# Patient Record
Sex: FEMALE | Race: WHITE | NOT HISPANIC OR LATINO | Employment: OTHER | URBAN - METROPOLITAN AREA
[De-identification: names, ages, dates, MRNs, and addresses within clinical notes are randomized per-mention and may not be internally consistent; named-entity substitution may affect disease eponyms.]

---

## 2017-01-13 ENCOUNTER — HOSPITAL ENCOUNTER (OUTPATIENT)
Dept: LAB | Facility: MEDICAL CENTER | Age: 67
End: 2017-01-13
Attending: INTERNAL MEDICINE
Payer: COMMERCIAL

## 2017-01-13 LAB
BUN SERPL-MCNC: 11 MG/DL (ref 8–22)
CREAT SERPL-MCNC: 0.67 MG/DL (ref 0.5–1.4)

## 2017-01-13 PROCEDURE — 82565 ASSAY OF CREATININE: CPT

## 2017-01-13 PROCEDURE — 84520 ASSAY OF UREA NITROGEN: CPT

## 2017-01-13 PROCEDURE — 36415 COLL VENOUS BLD VENIPUNCTURE: CPT

## 2017-01-16 ENCOUNTER — HOSPITAL ENCOUNTER (OUTPATIENT)
Dept: RADIOLOGY | Facility: MEDICAL CENTER | Age: 67
End: 2017-01-16
Attending: INTERNAL MEDICINE
Payer: COMMERCIAL

## 2017-01-16 DIAGNOSIS — R10.9 ABDOMINAL PAIN, UNSPECIFIED SITE: ICD-10-CM

## 2017-01-16 PROCEDURE — 74177 CT ABD & PELVIS W/CONTRAST: CPT

## 2017-01-16 PROCEDURE — 700117 HCHG RX CONTRAST REV CODE 255: Performed by: INTERNAL MEDICINE

## 2017-01-16 RX ADMIN — IOHEXOL 100 ML: 350 INJECTION, SOLUTION INTRAVENOUS at 08:44

## 2018-01-01 ENCOUNTER — HOSPITAL ENCOUNTER (EMERGENCY)
Facility: MEDICAL CENTER | Age: 68
End: 2018-04-10
Attending: EMERGENCY MEDICINE
Payer: MEDICARE

## 2018-01-01 ENCOUNTER — HOSPITAL ENCOUNTER (OUTPATIENT)
Dept: RADIATION ONCOLOGY | Facility: MEDICAL CENTER | Age: 68
End: 2018-09-27

## 2018-01-01 ENCOUNTER — HOME CARE VISIT (OUTPATIENT)
Dept: HOSPICE | Facility: HOSPICE | Age: 68
End: 2018-01-01
Payer: MEDICARE

## 2018-01-01 ENCOUNTER — HOSPITAL ENCOUNTER (OUTPATIENT)
Dept: LAB | Facility: MEDICAL CENTER | Age: 68
End: 2018-07-26
Attending: NURSE PRACTITIONER
Payer: MEDICARE

## 2018-01-01 ENCOUNTER — TELEPHONE (OUTPATIENT)
Dept: RADIATION ONCOLOGY | Facility: MEDICAL CENTER | Age: 68
End: 2018-01-01

## 2018-01-01 ENCOUNTER — RESOLUTE PROFESSIONAL BILLING HOSPITAL PROF FEE (OUTPATIENT)
Dept: HOSPITALIST | Facility: MEDICAL CENTER | Age: 68
End: 2018-01-01
Payer: MEDICARE

## 2018-01-01 ENCOUNTER — HOSPITAL ENCOUNTER (OUTPATIENT)
Dept: RADIOLOGY | Facility: MEDICAL CENTER | Age: 68
End: 2018-06-28
Attending: INTERNAL MEDICINE
Payer: MEDICARE

## 2018-01-01 ENCOUNTER — HOSPITAL ENCOUNTER (OUTPATIENT)
Dept: RADIATION ONCOLOGY | Facility: MEDICAL CENTER | Age: 68
End: 2018-07-02

## 2018-01-01 ENCOUNTER — HOSPITAL ENCOUNTER (OUTPATIENT)
Dept: RADIATION ONCOLOGY | Facility: MEDICAL CENTER | Age: 68
End: 2018-04-30

## 2018-01-01 ENCOUNTER — HOSPICE ADMISSION (OUTPATIENT)
Dept: HOSPICE | Facility: HOSPICE | Age: 68
End: 2018-01-01
Payer: MEDICARE

## 2018-01-01 ENCOUNTER — HOSPITAL ENCOUNTER (OUTPATIENT)
Dept: CARDIOLOGY | Facility: MEDICAL CENTER | Age: 68
End: 2018-01-01
Attending: INTERNAL MEDICINE
Payer: MEDICARE

## 2018-01-01 ENCOUNTER — HOSPITAL ENCOUNTER (OUTPATIENT)
Dept: RADIOLOGY | Facility: MEDICAL CENTER | Age: 68
End: 2018-04-17
Attending: PHYSICIAN ASSISTANT

## 2018-01-01 ENCOUNTER — PATIENT OUTREACH (OUTPATIENT)
Dept: HEALTH INFORMATION MANAGEMENT | Facility: OTHER | Age: 68
End: 2018-01-01

## 2018-01-01 ENCOUNTER — HOSPITAL ENCOUNTER (OUTPATIENT)
Dept: LAB | Facility: MEDICAL CENTER | Age: 68
End: 2018-09-08
Attending: RADIOLOGY
Payer: MEDICARE

## 2018-01-01 ENCOUNTER — APPOINTMENT (OUTPATIENT)
Dept: RADIOLOGY | Facility: MEDICAL CENTER | Age: 68
End: 2018-01-01
Attending: EMERGENCY MEDICINE
Payer: MEDICARE

## 2018-01-01 ENCOUNTER — APPOINTMENT (OUTPATIENT)
Dept: RADIOLOGY | Facility: MEDICAL CENTER | Age: 68
DRG: 025 | End: 2018-01-01
Attending: RADIOLOGY
Payer: MEDICARE

## 2018-01-01 ENCOUNTER — HOSPITAL ENCOUNTER (OUTPATIENT)
Dept: LAB | Facility: MEDICAL CENTER | Age: 68
End: 2018-04-27
Attending: FAMILY MEDICINE
Payer: MEDICARE

## 2018-01-01 ENCOUNTER — HOSPITAL ENCOUNTER (OUTPATIENT)
Facility: MEDICAL CENTER | Age: 68
End: 2018-06-28
Attending: RADIOLOGY | Admitting: RADIOLOGY
Payer: MEDICARE

## 2018-01-01 ENCOUNTER — HOSPITAL ENCOUNTER (OUTPATIENT)
Dept: RADIOLOGY | Facility: MEDICAL CENTER | Age: 68
End: 2018-06-14
Attending: INTERNAL MEDICINE
Payer: MEDICARE

## 2018-01-01 ENCOUNTER — APPOINTMENT (OUTPATIENT)
Dept: RADIOLOGY | Facility: MEDICAL CENTER | Age: 68
DRG: 025 | End: 2018-01-01
Attending: EMERGENCY MEDICINE
Payer: MEDICARE

## 2018-01-01 ENCOUNTER — TELEPHONE (OUTPATIENT)
Dept: MEDICAL GROUP | Facility: MEDICAL CENTER | Age: 68
End: 2018-01-01

## 2018-01-01 ENCOUNTER — APPOINTMENT (OUTPATIENT)
Dept: HOSPICE | Facility: HOSPICE | Age: 68
End: 2018-01-01
Payer: MEDICARE

## 2018-01-01 ENCOUNTER — HOSPITAL ENCOUNTER (INPATIENT)
Facility: MEDICAL CENTER | Age: 68
LOS: 10 days | DRG: 025 | End: 2018-04-20
Attending: EMERGENCY MEDICINE | Admitting: INTERNAL MEDICINE
Payer: MEDICARE

## 2018-01-01 ENCOUNTER — HOSPITAL ENCOUNTER (OUTPATIENT)
Dept: LAB | Facility: MEDICAL CENTER | Age: 68
End: 2018-10-29
Attending: INTERNAL MEDICINE
Payer: MEDICARE

## 2018-01-01 ENCOUNTER — HOSPITAL ENCOUNTER (OUTPATIENT)
Dept: RADIATION ONCOLOGY | Facility: MEDICAL CENTER | Age: 68
End: 2018-05-10

## 2018-01-01 ENCOUNTER — HOSPITAL ENCOUNTER (OUTPATIENT)
Dept: RADIATION ONCOLOGY | Facility: MEDICAL CENTER | Age: 68
End: 2018-06-25
Attending: RADIOLOGY
Payer: MEDICARE

## 2018-01-01 ENCOUNTER — HOSPITAL ENCOUNTER (OUTPATIENT)
Dept: RADIATION ONCOLOGY | Facility: MEDICAL CENTER | Age: 68
End: 2018-09-30
Attending: RADIOLOGY
Payer: MEDICARE

## 2018-01-01 ENCOUNTER — HOSPITAL ENCOUNTER (OUTPATIENT)
Dept: RADIOLOGY | Facility: MEDICAL CENTER | Age: 68
End: 2018-08-17
Attending: INTERNAL MEDICINE
Payer: MEDICARE

## 2018-01-01 ENCOUNTER — HOSPITAL ENCOUNTER (OUTPATIENT)
Dept: CARDIOLOGY | Facility: MEDICAL CENTER | Age: 68
End: 2018-06-25
Attending: INTERNAL MEDICINE
Payer: MEDICARE

## 2018-01-01 ENCOUNTER — HOSPITAL ENCOUNTER (OUTPATIENT)
Dept: LAB | Facility: MEDICAL CENTER | Age: 68
End: 2018-07-12
Attending: NURSE PRACTITIONER
Payer: MEDICARE

## 2018-01-01 ENCOUNTER — HOSPITAL ENCOUNTER (OUTPATIENT)
Dept: RADIATION ONCOLOGY | Facility: MEDICAL CENTER | Age: 68
End: 2018-07-31
Attending: RADIOLOGY
Payer: MEDICARE

## 2018-01-01 ENCOUNTER — HOSPITAL ENCOUNTER (EMERGENCY)
Facility: MEDICAL CENTER | Age: 68
End: 2018-09-28
Attending: EMERGENCY MEDICINE
Payer: MEDICARE

## 2018-01-01 ENCOUNTER — APPOINTMENT (OUTPATIENT)
Dept: RADIOLOGY | Facility: MEDICAL CENTER | Age: 68
DRG: 025 | End: 2018-01-01
Attending: PHYSICIAN ASSISTANT
Payer: MEDICARE

## 2018-01-01 ENCOUNTER — PATIENT OUTREACH (OUTPATIENT)
Dept: OTHER | Facility: MEDICAL CENTER | Age: 68
End: 2018-01-01

## 2018-01-01 ENCOUNTER — HOSPITAL ENCOUNTER (OUTPATIENT)
Dept: LAB | Facility: MEDICAL CENTER | Age: 68
End: 2018-07-06
Attending: NURSE PRACTITIONER
Payer: MEDICARE

## 2018-01-01 ENCOUNTER — HOSPITAL ENCOUNTER (OUTPATIENT)
Dept: RADIATION ONCOLOGY | Facility: MEDICAL CENTER | Age: 68
End: 2018-09-24

## 2018-01-01 ENCOUNTER — HOSPITAL ENCOUNTER (OUTPATIENT)
Dept: RADIATION ONCOLOGY | Facility: MEDICAL CENTER | Age: 68
End: 2018-10-31
Attending: RADIOLOGY
Payer: MEDICARE

## 2018-01-01 ENCOUNTER — HOSPITAL ENCOUNTER (OUTPATIENT)
Dept: RADIATION ONCOLOGY | Facility: MEDICAL CENTER | Age: 68
End: 2018-06-26
Payer: MEDICARE

## 2018-01-01 ENCOUNTER — HOSPITAL ENCOUNTER (OUTPATIENT)
Dept: RADIATION ONCOLOGY | Facility: MEDICAL CENTER | Age: 68
End: 2018-05-31
Attending: RADIOLOGY
Payer: MEDICARE

## 2018-01-01 ENCOUNTER — HOSPITAL ENCOUNTER (OUTPATIENT)
Dept: RADIATION ONCOLOGY | Facility: MEDICAL CENTER | Age: 68
End: 2018-04-30
Attending: RADIOLOGY
Payer: MEDICARE

## 2018-01-01 ENCOUNTER — HOSPITAL ENCOUNTER (OUTPATIENT)
Dept: RADIATION ONCOLOGY | Facility: MEDICAL CENTER | Age: 68
End: 2018-08-31
Attending: RADIOLOGY
Payer: MEDICARE

## 2018-01-01 ENCOUNTER — HOSPITAL ENCOUNTER (OUTPATIENT)
Dept: CARDIOLOGY | Facility: MEDICAL CENTER | Age: 68
End: 2018-09-03
Attending: INTERNAL MEDICINE
Payer: MEDICARE

## 2018-01-01 ENCOUNTER — HOSPITAL ENCOUNTER (OUTPATIENT)
Dept: RADIOLOGY | Facility: MEDICAL CENTER | Age: 68
End: 2018-05-25
Attending: INTERNAL MEDICINE
Payer: MEDICARE

## 2018-01-01 ENCOUNTER — HOSPITAL ENCOUNTER (OUTPATIENT)
Dept: LAB | Facility: MEDICAL CENTER | Age: 68
End: 2018-08-09
Attending: INTERNAL MEDICINE
Payer: MEDICARE

## 2018-01-01 ENCOUNTER — HOSPITAL ENCOUNTER (OUTPATIENT)
Dept: CARDIOLOGY | Facility: MEDICAL CENTER | Age: 68
End: 2018-06-27
Attending: INTERNAL MEDICINE
Payer: MEDICARE

## 2018-01-01 ENCOUNTER — PATIENT MESSAGE (OUTPATIENT)
Dept: HEALTH INFORMATION MANAGEMENT | Facility: OTHER | Age: 68
End: 2018-01-01

## 2018-01-01 ENCOUNTER — HOSPITAL ENCOUNTER (EMERGENCY)
Facility: MEDICAL CENTER | Age: 68
End: 2018-09-10
Attending: EMERGENCY MEDICINE
Payer: MEDICARE

## 2018-01-01 ENCOUNTER — HOSPITAL ENCOUNTER (OUTPATIENT)
Dept: RADIOLOGY | Facility: MEDICAL CENTER | Age: 68
End: 2018-09-18
Attending: RADIOLOGY
Payer: MEDICARE

## 2018-01-01 VITALS
HEART RATE: 94 BPM | TEMPERATURE: 97.9 F | RESPIRATION RATE: 20 BRPM | SYSTOLIC BLOOD PRESSURE: 120 MMHG | DIASTOLIC BLOOD PRESSURE: 60 MMHG | OXYGEN SATURATION: 93 %

## 2018-01-01 VITALS
HEART RATE: 84 BPM | OXYGEN SATURATION: 98 % | DIASTOLIC BLOOD PRESSURE: 76 MMHG | SYSTOLIC BLOOD PRESSURE: 128 MMHG | RESPIRATION RATE: 20 BRPM | TEMPERATURE: 97.7 F

## 2018-01-01 VITALS
DIASTOLIC BLOOD PRESSURE: 59 MMHG | SYSTOLIC BLOOD PRESSURE: 100 MMHG | RESPIRATION RATE: 16 BRPM | HEART RATE: 78 BPM | OXYGEN SATURATION: 98 %

## 2018-01-01 VITALS — SYSTOLIC BLOOD PRESSURE: 113 MMHG | HEART RATE: 91 BPM | DIASTOLIC BLOOD PRESSURE: 74 MMHG | RESPIRATION RATE: 18 BRPM

## 2018-01-01 VITALS — DIASTOLIC BLOOD PRESSURE: 79 MMHG | RESPIRATION RATE: 18 BRPM | SYSTOLIC BLOOD PRESSURE: 135 MMHG | HEART RATE: 103 BPM

## 2018-01-01 VITALS
RESPIRATION RATE: 18 BRPM | HEART RATE: 90 BPM | DIASTOLIC BLOOD PRESSURE: 81 MMHG | SYSTOLIC BLOOD PRESSURE: 137 MMHG | OXYGEN SATURATION: 98 %

## 2018-01-01 VITALS
SYSTOLIC BLOOD PRESSURE: 121 MMHG | DIASTOLIC BLOOD PRESSURE: 73 MMHG | RESPIRATION RATE: 18 BRPM | HEART RATE: 89 BPM | OXYGEN SATURATION: 98 %

## 2018-01-01 VITALS
DIASTOLIC BLOOD PRESSURE: 57 MMHG | WEIGHT: 154 LBS | SYSTOLIC BLOOD PRESSURE: 106 MMHG | HEIGHT: 68 IN | OXYGEN SATURATION: 98 % | HEART RATE: 90 BPM | BODY MASS INDEX: 23.34 KG/M2 | RESPIRATION RATE: 16 BRPM

## 2018-01-01 VITALS
OXYGEN SATURATION: 96 % | WEIGHT: 151.24 LBS | DIASTOLIC BLOOD PRESSURE: 95 MMHG | HEIGHT: 68 IN | SYSTOLIC BLOOD PRESSURE: 188 MMHG | HEART RATE: 106 BPM | RESPIRATION RATE: 16 BRPM | BODY MASS INDEX: 22.92 KG/M2 | TEMPERATURE: 98.9 F

## 2018-01-01 VITALS
RESPIRATION RATE: 16 BRPM | DIASTOLIC BLOOD PRESSURE: 60 MMHG | WEIGHT: 143.74 LBS | TEMPERATURE: 98.8 F | SYSTOLIC BLOOD PRESSURE: 138 MMHG | BODY MASS INDEX: 21.78 KG/M2 | OXYGEN SATURATION: 92 % | HEIGHT: 68 IN | HEART RATE: 68 BPM

## 2018-01-01 VITALS
DIASTOLIC BLOOD PRESSURE: 75 MMHG | WEIGHT: 154 LBS | HEART RATE: 102 BPM | TEMPERATURE: 97.5 F | BODY MASS INDEX: 23.42 KG/M2 | OXYGEN SATURATION: 95 % | SYSTOLIC BLOOD PRESSURE: 148 MMHG

## 2018-01-01 VITALS
DIASTOLIC BLOOD PRESSURE: 67 MMHG | HEART RATE: 82 BPM | RESPIRATION RATE: 16 BRPM | SYSTOLIC BLOOD PRESSURE: 121 MMHG | OXYGEN SATURATION: 97 %

## 2018-01-01 VITALS
RESPIRATION RATE: 18 BRPM | OXYGEN SATURATION: 99 % | DIASTOLIC BLOOD PRESSURE: 73 MMHG | SYSTOLIC BLOOD PRESSURE: 101 MMHG | HEART RATE: 93 BPM

## 2018-01-01 VITALS
HEART RATE: 90 BPM | SYSTOLIC BLOOD PRESSURE: 145 MMHG | BODY MASS INDEX: 25.73 KG/M2 | DIASTOLIC BLOOD PRESSURE: 72 MMHG | OXYGEN SATURATION: 96 % | HEIGHT: 68 IN | WEIGHT: 169.75 LBS | RESPIRATION RATE: 19 BRPM | TEMPERATURE: 98.4 F

## 2018-01-01 VITALS
OXYGEN SATURATION: 94 % | HEART RATE: 91 BPM | WEIGHT: 156.53 LBS | TEMPERATURE: 98.2 F | RESPIRATION RATE: 18 BRPM | HEIGHT: 68 IN | SYSTOLIC BLOOD PRESSURE: 132 MMHG | BODY MASS INDEX: 23.72 KG/M2 | DIASTOLIC BLOOD PRESSURE: 66 MMHG

## 2018-01-01 VITALS
OXYGEN SATURATION: 94 % | BODY MASS INDEX: 23.95 KG/M2 | TEMPERATURE: 98.5 F | DIASTOLIC BLOOD PRESSURE: 67 MMHG | HEART RATE: 101 BPM | WEIGHT: 157.5 LBS | SYSTOLIC BLOOD PRESSURE: 144 MMHG

## 2018-01-01 VITALS
DIASTOLIC BLOOD PRESSURE: 66 MMHG | RESPIRATION RATE: 18 BRPM | OXYGEN SATURATION: 89 % | SYSTOLIC BLOOD PRESSURE: 117 MMHG | HEART RATE: 105 BPM

## 2018-01-01 VITALS
OXYGEN SATURATION: 98 % | SYSTOLIC BLOOD PRESSURE: 127 MMHG | HEART RATE: 86 BPM | DIASTOLIC BLOOD PRESSURE: 68 MMHG | RESPIRATION RATE: 18 BRPM

## 2018-01-01 VITALS
OXYGEN SATURATION: 95 % | RESPIRATION RATE: 16 BRPM | SYSTOLIC BLOOD PRESSURE: 129 MMHG | DIASTOLIC BLOOD PRESSURE: 65 MMHG | HEART RATE: 64 BPM

## 2018-01-01 VITALS
RESPIRATION RATE: 18 BRPM | OXYGEN SATURATION: 99 % | SYSTOLIC BLOOD PRESSURE: 113 MMHG | HEART RATE: 44 BPM | DIASTOLIC BLOOD PRESSURE: 55 MMHG

## 2018-01-01 VITALS
HEART RATE: 88 BPM | SYSTOLIC BLOOD PRESSURE: 121 MMHG | DIASTOLIC BLOOD PRESSURE: 73 MMHG | RESPIRATION RATE: 18 BRPM | OXYGEN SATURATION: 98 %

## 2018-01-01 VITALS
RESPIRATION RATE: 16 BRPM | HEART RATE: 99 BPM | SYSTOLIC BLOOD PRESSURE: 136 MMHG | DIASTOLIC BLOOD PRESSURE: 85 MMHG | OXYGEN SATURATION: 93 %

## 2018-01-01 VITALS — HEART RATE: 90 BPM | DIASTOLIC BLOOD PRESSURE: 87 MMHG | SYSTOLIC BLOOD PRESSURE: 150 MMHG | RESPIRATION RATE: 16 BRPM

## 2018-01-01 VITALS
RESPIRATION RATE: 18 BRPM | DIASTOLIC BLOOD PRESSURE: 70 MMHG | OXYGEN SATURATION: 99 % | HEART RATE: 94 BPM | SYSTOLIC BLOOD PRESSURE: 131 MMHG

## 2018-01-01 VITALS
DIASTOLIC BLOOD PRESSURE: 69 MMHG | OXYGEN SATURATION: 98 % | RESPIRATION RATE: 16 BRPM | HEART RATE: 78 BPM | SYSTOLIC BLOOD PRESSURE: 129 MMHG

## 2018-01-01 VITALS — HEART RATE: 92 BPM | RESPIRATION RATE: 18 BRPM

## 2018-01-01 VITALS
SYSTOLIC BLOOD PRESSURE: 119 MMHG | RESPIRATION RATE: 18 BRPM | DIASTOLIC BLOOD PRESSURE: 70 MMHG | HEART RATE: 97 BPM | OXYGEN SATURATION: 95 %

## 2018-01-01 VITALS
HEART RATE: 87 BPM | OXYGEN SATURATION: 98 % | DIASTOLIC BLOOD PRESSURE: 60 MMHG | SYSTOLIC BLOOD PRESSURE: 108 MMHG | RESPIRATION RATE: 16 BRPM

## 2018-01-01 VITALS
DIASTOLIC BLOOD PRESSURE: 65 MMHG | WEIGHT: 156 LBS | HEART RATE: 92 BPM | SYSTOLIC BLOOD PRESSURE: 144 MMHG | OXYGEN SATURATION: 98 % | TEMPERATURE: 98.5 F | BODY MASS INDEX: 23.72 KG/M2

## 2018-01-01 VITALS
SYSTOLIC BLOOD PRESSURE: 106 MMHG | RESPIRATION RATE: 18 BRPM | OXYGEN SATURATION: 89 % | HEART RATE: 93 BPM | DIASTOLIC BLOOD PRESSURE: 58 MMHG

## 2018-01-01 DIAGNOSIS — C50.912 MALIGNANT NEOPLASM OF LEFT FEMALE BREAST, UNSPECIFIED ESTROGEN RECEPTOR STATUS, UNSPECIFIED SITE OF BREAST (HCC): ICD-10-CM

## 2018-01-01 DIAGNOSIS — C79.31 MALIGNANT NEOPLASM METASTATIC TO BRAIN (HCC): ICD-10-CM

## 2018-01-01 DIAGNOSIS — C79.31 SECONDARY CANCER OF BRAIN (HCC): ICD-10-CM

## 2018-01-01 DIAGNOSIS — R56.9 SEIZURE (HCC): ICD-10-CM

## 2018-01-01 DIAGNOSIS — C50.919 MALIGNANT NEOPLASM OF FEMALE BREAST, UNSPECIFIED ESTROGEN RECEPTOR STATUS, UNSPECIFIED LATERALITY, UNSPECIFIED SITE OF BREAST (HCC): ICD-10-CM

## 2018-01-01 DIAGNOSIS — C79.31 BRAIN METASTASIS: ICD-10-CM

## 2018-01-01 DIAGNOSIS — C79.31 BRAIN METASTASES: ICD-10-CM

## 2018-01-01 DIAGNOSIS — R51.9 INTRACTABLE HEADACHE, UNSPECIFIED CHRONICITY PATTERN, UNSPECIFIED HEADACHE TYPE: ICD-10-CM

## 2018-01-01 DIAGNOSIS — R93.89 INFILTRATE NOTED ON IMAGING STUDY: ICD-10-CM

## 2018-01-01 DIAGNOSIS — C79.31 SECONDARY MALIGNANT NEOPLASM OF BRAIN AND SPINAL CORD (HCC): ICD-10-CM

## 2018-01-01 DIAGNOSIS — G93.6 INTRACRANIAL EDEMA (HCC): ICD-10-CM

## 2018-01-01 DIAGNOSIS — G93.89 BRAIN MASS: ICD-10-CM

## 2018-01-01 DIAGNOSIS — C50.911 MALIGNANT NEOPLASM OF RIGHT FEMALE BREAST, UNSPECIFIED ESTROGEN RECEPTOR STATUS, UNSPECIFIED SITE OF BREAST (HCC): ICD-10-CM

## 2018-01-01 DIAGNOSIS — R90.0 INTRACRANIAL MASS: ICD-10-CM

## 2018-01-01 DIAGNOSIS — R51.9 CHRONIC NONINTRACTABLE HEADACHE, UNSPECIFIED HEADACHE TYPE: ICD-10-CM

## 2018-01-01 DIAGNOSIS — C79.31 MALIGNANT NEOPLASM OF BREAST METASTATIC TO BRAIN, UNSPECIFIED LATERALITY (HCC): ICD-10-CM

## 2018-01-01 DIAGNOSIS — C50.919 MALIGNANT NEOPLASM OF BREAST METASTATIC TO BRAIN, UNSPECIFIED LATERALITY (HCC): ICD-10-CM

## 2018-01-01 DIAGNOSIS — C79.49 SECONDARY MALIGNANT NEOPLASM OF BRAIN AND SPINAL CORD (HCC): ICD-10-CM

## 2018-01-01 DIAGNOSIS — G89.29 CHRONIC NONINTRACTABLE HEADACHE, UNSPECIFIED HEADACHE TYPE: ICD-10-CM

## 2018-01-01 DIAGNOSIS — C79.9 METASTATIC DISEASE (HCC): ICD-10-CM

## 2018-01-01 DIAGNOSIS — R91.8 HILAR MASS: ICD-10-CM

## 2018-01-01 LAB
ABO GROUP BLD: NORMAL
ABO GROUP BLD: NORMAL
ALBUMIN SERPL BCP-MCNC: 3.8 G/DL (ref 3.2–4.9)
ALBUMIN SERPL BCP-MCNC: 4 G/DL (ref 3.2–4.9)
ALBUMIN SERPL BCP-MCNC: 4.1 G/DL (ref 3.2–4.9)
ALBUMIN SERPL BCP-MCNC: 4.2 G/DL (ref 3.2–4.9)
ALBUMIN SERPL BCP-MCNC: 4.2 G/DL (ref 3.2–4.9)
ALBUMIN SERPL BCP-MCNC: 4.3 G/DL (ref 3.2–4.9)
ALBUMIN SERPL BCP-MCNC: 4.4 G/DL (ref 3.2–4.9)
ALBUMIN SERPL BCP-MCNC: 4.6 G/DL (ref 3.2–4.9)
ALBUMIN/GLOB SERPL: 1.1 G/DL
ALBUMIN/GLOB SERPL: 1.3 G/DL
ALBUMIN/GLOB SERPL: 1.4 G/DL
ALBUMIN/GLOB SERPL: 1.4 G/DL
ALBUMIN/GLOB SERPL: 1.6 G/DL
ALBUMIN/GLOB SERPL: 1.8 G/DL
ALBUMIN/GLOB SERPL: 1.8 G/DL
ALBUMIN/GLOB SERPL: 1.9 G/DL
ALBUMIN/GLOB SERPL: 1.9 G/DL
ALBUMIN/GLOB SERPL: 2 G/DL
ALP SERPL-CCNC: 103 U/L (ref 30–99)
ALP SERPL-CCNC: 106 U/L (ref 30–99)
ALP SERPL-CCNC: 107 U/L (ref 30–99)
ALP SERPL-CCNC: 117 U/L (ref 30–99)
ALP SERPL-CCNC: 125 U/L (ref 30–99)
ALP SERPL-CCNC: 61 U/L (ref 30–99)
ALP SERPL-CCNC: 72 U/L (ref 30–99)
ALP SERPL-CCNC: 73 U/L (ref 30–99)
ALP SERPL-CCNC: 87 U/L (ref 30–99)
ALP SERPL-CCNC: 89 U/L (ref 30–99)
ALT SERPL-CCNC: 10 U/L (ref 2–50)
ALT SERPL-CCNC: 11 U/L (ref 2–50)
ALT SERPL-CCNC: 12 U/L (ref 2–50)
ALT SERPL-CCNC: 13 U/L (ref 2–50)
ALT SERPL-CCNC: 14 U/L (ref 2–50)
ALT SERPL-CCNC: 15 U/L (ref 2–50)
ALT SERPL-CCNC: 18 U/L (ref 2–50)
ALT SERPL-CCNC: 29 U/L (ref 2–50)
ALT SERPL-CCNC: 30 U/L (ref 2–50)
ALT SERPL-CCNC: 9 U/L (ref 2–50)
AMBIGUOUS DTTM AMBI4: NORMAL
AMBIGUOUS DTTM AMBI4: NORMAL
ANION GAP SERPL CALC-SCNC: 10 MMOL/L (ref 0–11.9)
ANION GAP SERPL CALC-SCNC: 11 MMOL/L (ref 0–11.9)
ANION GAP SERPL CALC-SCNC: 11 MMOL/L (ref 0–11.9)
ANION GAP SERPL CALC-SCNC: 12 MMOL/L (ref 0–11.9)
ANION GAP SERPL CALC-SCNC: 6 MMOL/L (ref 0–11.9)
ANION GAP SERPL CALC-SCNC: 6 MMOL/L (ref 0–11.9)
ANION GAP SERPL CALC-SCNC: 7 MMOL/L (ref 0–11.9)
ANION GAP SERPL CALC-SCNC: 7 MMOL/L (ref 0–11.9)
ANION GAP SERPL CALC-SCNC: 8 MMOL/L (ref 0–11.9)
ANION GAP SERPL CALC-SCNC: 9 MMOL/L (ref 0–11.9)
APPEARANCE UR: CLEAR
APTT PPP: 34.5 SEC (ref 24.7–36)
APTT PPP: 34.5 SEC (ref 24.7–36)
AST SERPL-CCNC: 12 U/L (ref 12–45)
AST SERPL-CCNC: 12 U/L (ref 12–45)
AST SERPL-CCNC: 13 U/L (ref 12–45)
AST SERPL-CCNC: 14 U/L (ref 12–45)
AST SERPL-CCNC: 15 U/L (ref 12–45)
AST SERPL-CCNC: 22 U/L (ref 12–45)
AST SERPL-CCNC: 26 U/L (ref 12–45)
AST SERPL-CCNC: 30 U/L (ref 12–45)
BACTERIA SPEC RESP CULT: ABNORMAL
BACTERIA SPEC RESP CULT: ABNORMAL
BACTERIA STL CULT: NORMAL
BASOPHILS # BLD AUTO: 0.1 % (ref 0–1.8)
BASOPHILS # BLD AUTO: 0.2 % (ref 0–1.8)
BASOPHILS # BLD AUTO: 0.3 % (ref 0–1.8)
BASOPHILS # BLD AUTO: 0.4 % (ref 0–1.8)
BASOPHILS # BLD AUTO: 0.6 % (ref 0–1.8)
BASOPHILS # BLD AUTO: 0.6 % (ref 0–1.8)
BASOPHILS # BLD: 0.01 K/UL (ref 0–0.12)
BASOPHILS # BLD: 0.01 K/UL (ref 0–0.12)
BASOPHILS # BLD: 0.02 K/UL (ref 0–0.12)
BASOPHILS # BLD: 0.03 K/UL (ref 0–0.12)
BASOPHILS # BLD: 0.04 K/UL (ref 0–0.12)
BASOPHILS # BLD: 0.05 K/UL (ref 0–0.12)
BASOPHILS # BLD: 0.05 K/UL (ref 0–0.12)
BASOPHILS # BLD: 0.06 K/UL (ref 0–0.12)
BASOPHILS # BLD: 0.06 K/UL (ref 0–0.12)
BILIRUB SERPL-MCNC: 0.2 MG/DL (ref 0.1–1.5)
BILIRUB SERPL-MCNC: 0.3 MG/DL (ref 0.1–1.5)
BILIRUB SERPL-MCNC: 0.4 MG/DL (ref 0.1–1.5)
BILIRUB SERPL-MCNC: 0.5 MG/DL (ref 0.1–1.5)
BILIRUB UR QL STRIP.AUTO: ABNORMAL
BLD GP AB SCN SERPL QL: NORMAL
BUN SERPL-MCNC: 10 MG/DL (ref 8–22)
BUN SERPL-MCNC: 11 MG/DL (ref 8–22)
BUN SERPL-MCNC: 11 MG/DL (ref 8–22)
BUN SERPL-MCNC: 12 MG/DL (ref 8–22)
BUN SERPL-MCNC: 13 MG/DL (ref 8–22)
BUN SERPL-MCNC: 14 MG/DL (ref 8–22)
BUN SERPL-MCNC: 14 MG/DL (ref 8–22)
BUN SERPL-MCNC: 15 MG/DL (ref 8–22)
BUN SERPL-MCNC: 15 MG/DL (ref 8–22)
BUN SERPL-MCNC: 16 MG/DL (ref 8–22)
BUN SERPL-MCNC: 17 MG/DL (ref 8–22)
BUN SERPL-MCNC: 17 MG/DL (ref 8–22)
BUN SERPL-MCNC: 20 MG/DL (ref 8–22)
BUN SERPL-MCNC: 21 MG/DL (ref 8–22)
BUN SERPL-MCNC: 7 MG/DL (ref 8–22)
BUN SERPL-MCNC: 9 MG/DL (ref 8–22)
BUN SERPL-MCNC: 9 MG/DL (ref 8–22)
CALCIUM SERPL-MCNC: 10 MG/DL (ref 8.5–10.5)
CALCIUM SERPL-MCNC: 7.5 MG/DL (ref 8.5–10.5)
CALCIUM SERPL-MCNC: 8.5 MG/DL (ref 8.5–10.5)
CALCIUM SERPL-MCNC: 8.6 MG/DL (ref 8.5–10.5)
CALCIUM SERPL-MCNC: 8.8 MG/DL (ref 8.5–10.5)
CALCIUM SERPL-MCNC: 8.9 MG/DL (ref 8.5–10.5)
CALCIUM SERPL-MCNC: 9 MG/DL (ref 8.5–10.5)
CALCIUM SERPL-MCNC: 9.1 MG/DL (ref 8.4–10.2)
CALCIUM SERPL-MCNC: 9.3 MG/DL (ref 8.5–10.5)
CALCIUM SERPL-MCNC: 9.4 MG/DL (ref 8.5–10.5)
CALCIUM SERPL-MCNC: 9.5 MG/DL (ref 8.5–10.5)
CALCIUM SERPL-MCNC: 9.6 MG/DL (ref 8.5–10.5)
CALCIUM SERPL-MCNC: 9.7 MG/DL (ref 8.5–10.5)
CALCIUM SERPL-MCNC: 9.8 MG/DL (ref 8.5–10.5)
CALCIUM SERPL-MCNC: 9.9 MG/DL (ref 8.5–10.5)
CHLORIDE SERPL-SCNC: 100 MMOL/L (ref 96–112)
CHLORIDE SERPL-SCNC: 101 MMOL/L (ref 96–112)
CHLORIDE SERPL-SCNC: 103 MMOL/L (ref 96–112)
CHLORIDE SERPL-SCNC: 104 MMOL/L (ref 96–112)
CHLORIDE SERPL-SCNC: 104 MMOL/L (ref 96–112)
CHLORIDE SERPL-SCNC: 105 MMOL/L (ref 96–112)
CHLORIDE SERPL-SCNC: 106 MMOL/L (ref 96–112)
CHLORIDE SERPL-SCNC: 110 MMOL/L (ref 96–112)
CHLORIDE SERPL-SCNC: 98 MMOL/L (ref 96–112)
CHLORIDE SERPL-SCNC: 98 MMOL/L (ref 96–112)
CHLORIDE SERPL-SCNC: 99 MMOL/L (ref 96–112)
CO2 SERPL-SCNC: 22 MMOL/L (ref 20–33)
CO2 SERPL-SCNC: 23 MMOL/L (ref 20–33)
CO2 SERPL-SCNC: 24 MMOL/L (ref 20–33)
CO2 SERPL-SCNC: 25 MMOL/L (ref 20–33)
CO2 SERPL-SCNC: 26 MMOL/L (ref 20–33)
CO2 SERPL-SCNC: 26 MMOL/L (ref 20–33)
CO2 SERPL-SCNC: 27 MMOL/L (ref 20–33)
CO2 SERPL-SCNC: 28 MMOL/L (ref 20–33)
COLOR UR: YELLOW
CREAT SERPL-MCNC: 0.4 MG/DL (ref 0.5–1.4)
CREAT SERPL-MCNC: 0.43 MG/DL (ref 0.5–1.4)
CREAT SERPL-MCNC: 0.52 MG/DL (ref 0.5–1.4)
CREAT SERPL-MCNC: 0.54 MG/DL (ref 0.5–1.4)
CREAT SERPL-MCNC: 0.55 MG/DL (ref 0.5–1.4)
CREAT SERPL-MCNC: 0.57 MG/DL (ref 0.5–1.4)
CREAT SERPL-MCNC: 0.58 MG/DL (ref 0.5–1.4)
CREAT SERPL-MCNC: 0.59 MG/DL (ref 0.5–1.4)
CREAT SERPL-MCNC: 0.6 MG/DL (ref 0.5–1.4)
CREAT SERPL-MCNC: 0.65 MG/DL (ref 0.5–1.4)
CREAT SERPL-MCNC: 0.67 MG/DL (ref 0.5–1.4)
CREAT SERPL-MCNC: 0.69 MG/DL (ref 0.5–1.4)
CREAT SERPL-MCNC: 0.69 MG/DL (ref 0.5–1.4)
CREAT SERPL-MCNC: 0.73 MG/DL (ref 0.5–1.4)
CREAT SERPL-MCNC: 0.8 MG/DL (ref 0.5–1.4)
CREAT SERPL-MCNC: 0.88 MG/DL (ref 0.5–1.4)
CULTURE IF INDICATED INDCX: NO UA CULTURE
CYTOLOGY REG CYTOL: NORMAL
CYTOLOGY REG CYTOL: NORMAL
E COLI SXT1+2 STL IA: NORMAL
EKG IMPRESSION: NORMAL
EKG IMPRESSION: NORMAL
EOSINOPHIL # BLD AUTO: 0 K/UL (ref 0–0.51)
EOSINOPHIL # BLD AUTO: 0.01 K/UL (ref 0–0.51)
EOSINOPHIL # BLD AUTO: 0.15 K/UL (ref 0–0.51)
EOSINOPHIL # BLD AUTO: 0.19 K/UL (ref 0–0.51)
EOSINOPHIL # BLD AUTO: 0.24 K/UL (ref 0–0.51)
EOSINOPHIL NFR BLD: 0 % (ref 0–6.9)
EOSINOPHIL NFR BLD: 0.1 % (ref 0–6.9)
EOSINOPHIL NFR BLD: 0.8 % (ref 0–6.9)
EOSINOPHIL NFR BLD: 2.1 % (ref 0–6.9)
EOSINOPHIL NFR BLD: 2.3 % (ref 0–6.9)
ERYTHROCYTE [DISTWIDTH] IN BLOOD BY AUTOMATED COUNT: 45.1 FL (ref 35.9–50)
ERYTHROCYTE [DISTWIDTH] IN BLOOD BY AUTOMATED COUNT: 45.4 FL (ref 35.9–50)
ERYTHROCYTE [DISTWIDTH] IN BLOOD BY AUTOMATED COUNT: 45.5 FL (ref 35.9–50)
ERYTHROCYTE [DISTWIDTH] IN BLOOD BY AUTOMATED COUNT: 45.5 FL (ref 35.9–50)
ERYTHROCYTE [DISTWIDTH] IN BLOOD BY AUTOMATED COUNT: 45.8 FL (ref 35.9–50)
ERYTHROCYTE [DISTWIDTH] IN BLOOD BY AUTOMATED COUNT: 45.8 FL (ref 35.9–50)
ERYTHROCYTE [DISTWIDTH] IN BLOOD BY AUTOMATED COUNT: 46 FL (ref 35.9–50)
ERYTHROCYTE [DISTWIDTH] IN BLOOD BY AUTOMATED COUNT: 46.4 FL (ref 35.9–50)
ERYTHROCYTE [DISTWIDTH] IN BLOOD BY AUTOMATED COUNT: 46.8 FL (ref 35.9–50)
ERYTHROCYTE [DISTWIDTH] IN BLOOD BY AUTOMATED COUNT: 47.2 FL (ref 35.9–50)
ERYTHROCYTE [DISTWIDTH] IN BLOOD BY AUTOMATED COUNT: 47.3 FL (ref 35.9–50)
ERYTHROCYTE [DISTWIDTH] IN BLOOD BY AUTOMATED COUNT: 47.4 FL (ref 35.9–50)
FUNGUS SPEC CULT: NORMAL
GLOBULIN SER CALC-MCNC: 2.1 G/DL (ref 1.9–3.5)
GLOBULIN SER CALC-MCNC: 2.2 G/DL (ref 1.9–3.5)
GLOBULIN SER CALC-MCNC: 2.4 G/DL (ref 1.9–3.5)
GLOBULIN SER CALC-MCNC: 2.4 G/DL (ref 1.9–3.5)
GLOBULIN SER CALC-MCNC: 2.5 G/DL (ref 1.9–3.5)
GLOBULIN SER CALC-MCNC: 2.6 G/DL (ref 1.9–3.5)
GLOBULIN SER CALC-MCNC: 2.8 G/DL (ref 1.9–3.5)
GLOBULIN SER CALC-MCNC: 3.1 G/DL (ref 1.9–3.5)
GLOBULIN SER CALC-MCNC: 3.2 G/DL (ref 1.9–3.5)
GLOBULIN SER CALC-MCNC: 3.4 G/DL (ref 1.9–3.5)
GLUCOSE SERPL-MCNC: 105 MG/DL (ref 65–99)
GLUCOSE SERPL-MCNC: 106 MG/DL (ref 65–99)
GLUCOSE SERPL-MCNC: 106 MG/DL (ref 65–99)
GLUCOSE SERPL-MCNC: 108 MG/DL (ref 65–99)
GLUCOSE SERPL-MCNC: 114 MG/DL (ref 65–99)
GLUCOSE SERPL-MCNC: 115 MG/DL (ref 65–99)
GLUCOSE SERPL-MCNC: 117 MG/DL (ref 65–99)
GLUCOSE SERPL-MCNC: 119 MG/DL (ref 65–99)
GLUCOSE SERPL-MCNC: 120 MG/DL (ref 65–99)
GLUCOSE SERPL-MCNC: 128 MG/DL (ref 65–99)
GLUCOSE SERPL-MCNC: 133 MG/DL (ref 65–99)
GLUCOSE SERPL-MCNC: 142 MG/DL (ref 65–99)
GLUCOSE SERPL-MCNC: 85 MG/DL (ref 65–99)
GLUCOSE SERPL-MCNC: 85 MG/DL (ref 65–99)
GLUCOSE SERPL-MCNC: 88 MG/DL (ref 65–99)
GLUCOSE SERPL-MCNC: 91 MG/DL (ref 65–99)
GLUCOSE SERPL-MCNC: 95 MG/DL (ref 65–99)
GLUCOSE SERPL-MCNC: 98 MG/DL (ref 65–99)
GLUCOSE SERPL-MCNC: 98 MG/DL (ref 65–99)
GLUCOSE SERPL-MCNC: 99 MG/DL (ref 65–99)
GLUCOSE UR STRIP.AUTO-MCNC: NEGATIVE MG/DL
GRAM STN SPEC: ABNORMAL
GRAM STN SPEC: NORMAL
HCT VFR BLD AUTO: 36.4 % (ref 37–47)
HCT VFR BLD AUTO: 37.1 % (ref 37–47)
HCT VFR BLD AUTO: 38.4 % (ref 37–47)
HCT VFR BLD AUTO: 39 % (ref 37–47)
HCT VFR BLD AUTO: 39 % (ref 37–47)
HCT VFR BLD AUTO: 40.3 % (ref 37–47)
HCT VFR BLD AUTO: 40.4 % (ref 37–47)
HCT VFR BLD AUTO: 41.9 % (ref 37–47)
HCT VFR BLD AUTO: 43.5 % (ref 37–47)
HCT VFR BLD AUTO: 43.9 % (ref 37–47)
HCT VFR BLD AUTO: 44.8 % (ref 37–47)
HCT VFR BLD AUTO: 47.3 % (ref 37–47)
HGB BLD-MCNC: 12.2 G/DL (ref 12–16)
HGB BLD-MCNC: 12.6 G/DL (ref 12–16)
HGB BLD-MCNC: 13.3 G/DL (ref 12–16)
HGB BLD-MCNC: 13.4 G/DL (ref 12–16)
HGB BLD-MCNC: 13.7 G/DL (ref 12–16)
HGB BLD-MCNC: 14 G/DL (ref 12–16)
HGB BLD-MCNC: 14.5 G/DL (ref 12–16)
HGB BLD-MCNC: 14.6 G/DL (ref 12–16)
HGB BLD-MCNC: 14.7 G/DL (ref 12–16)
HGB BLD-MCNC: 15.1 G/DL (ref 12–16)
HGB BLD-MCNC: 15.8 G/DL (ref 12–16)
HGB BLD-MCNC: 16.5 G/DL (ref 12–16)
IMM GRANULOCYTES # BLD AUTO: 0.03 K/UL (ref 0–0.11)
IMM GRANULOCYTES # BLD AUTO: 0.04 K/UL (ref 0–0.11)
IMM GRANULOCYTES # BLD AUTO: 0.08 K/UL (ref 0–0.11)
IMM GRANULOCYTES # BLD AUTO: 0.08 K/UL (ref 0–0.11)
IMM GRANULOCYTES # BLD AUTO: 0.09 K/UL (ref 0–0.11)
IMM GRANULOCYTES # BLD AUTO: 0.13 K/UL (ref 0–0.11)
IMM GRANULOCYTES # BLD AUTO: 0.16 K/UL (ref 0–0.11)
IMM GRANULOCYTES # BLD AUTO: 0.24 K/UL (ref 0–0.11)
IMM GRANULOCYTES # BLD AUTO: 0.31 K/UL (ref 0–0.11)
IMM GRANULOCYTES NFR BLD AUTO: 0.3 % (ref 0–0.9)
IMM GRANULOCYTES NFR BLD AUTO: 0.4 % (ref 0–0.9)
IMM GRANULOCYTES NFR BLD AUTO: 0.4 % (ref 0–0.9)
IMM GRANULOCYTES NFR BLD AUTO: 0.5 % (ref 0–0.9)
IMM GRANULOCYTES NFR BLD AUTO: 0.6 % (ref 0–0.9)
IMM GRANULOCYTES NFR BLD AUTO: 0.7 % (ref 0–0.9)
IMM GRANULOCYTES NFR BLD AUTO: 0.7 % (ref 0–0.9)
IMM GRANULOCYTES NFR BLD AUTO: 0.8 % (ref 0–0.9)
IMM GRANULOCYTES NFR BLD AUTO: 1 % (ref 0–0.9)
IMM GRANULOCYTES NFR BLD AUTO: 1.7 % (ref 0–0.9)
IMM GRANULOCYTES NFR BLD AUTO: 1.7 % (ref 0–0.9)
INR PPP: 0.9 (ref 0.87–1.13)
INR PPP: 0.91 (ref 0.87–1.13)
INR PPP: 0.94 (ref 0.87–1.13)
KETONES UR STRIP.AUTO-MCNC: 15 MG/DL
LEUKOCYTE ESTERASE UR QL STRIP.AUTO: NEGATIVE
LV EJECT FRACT  99904: 65
LV EJECT FRACT  99904: 65
LV EJECT FRACT MOD 2C 99903: 61.12
LV EJECT FRACT MOD 2C 99903: 68.7
LV EJECT FRACT MOD 4C 99902: 64.74
LV EJECT FRACT MOD 4C 99902: 65.66
LV EJECT FRACT MOD BP 99901: 62.22
LV EJECT FRACT MOD BP 99901: 63.75
LYMPHOCYTES # BLD AUTO: 1.01 K/UL (ref 1–4.8)
LYMPHOCYTES # BLD AUTO: 1.05 K/UL (ref 1–4.8)
LYMPHOCYTES # BLD AUTO: 1.11 K/UL (ref 1–4.8)
LYMPHOCYTES # BLD AUTO: 1.23 K/UL (ref 1–4.8)
LYMPHOCYTES # BLD AUTO: 1.31 K/UL (ref 1–4.8)
LYMPHOCYTES # BLD AUTO: 1.43 K/UL (ref 1–4.8)
LYMPHOCYTES # BLD AUTO: 1.91 K/UL (ref 1–4.8)
LYMPHOCYTES # BLD AUTO: 2.3 K/UL (ref 1–4.8)
LYMPHOCYTES # BLD AUTO: 2.65 K/UL (ref 1–4.8)
LYMPHOCYTES NFR BLD: 11.3 % (ref 22–41)
LYMPHOCYTES NFR BLD: 11.8 % (ref 22–41)
LYMPHOCYTES NFR BLD: 17.7 % (ref 22–41)
LYMPHOCYTES NFR BLD: 18.2 % (ref 22–41)
LYMPHOCYTES NFR BLD: 22.1 % (ref 22–41)
LYMPHOCYTES NFR BLD: 28.6 % (ref 22–41)
LYMPHOCYTES NFR BLD: 5.6 % (ref 22–41)
LYMPHOCYTES NFR BLD: 6.1 % (ref 22–41)
LYMPHOCYTES NFR BLD: 6.9 % (ref 22–41)
LYMPHOCYTES NFR BLD: 9.3 % (ref 22–41)
LYMPHOCYTES NFR BLD: 9.4 % (ref 22–41)
MAGNESIUM SERPL-MCNC: 1.7 MG/DL (ref 1.5–2.5)
MAGNESIUM SERPL-MCNC: 1.8 MG/DL (ref 1.5–2.5)
MAGNESIUM SERPL-MCNC: 1.9 MG/DL (ref 1.5–2.5)
MAGNESIUM SERPL-MCNC: 1.9 MG/DL (ref 1.5–2.5)
MAGNESIUM SERPL-MCNC: 2 MG/DL (ref 1.5–2.5)
MAGNESIUM SERPL-MCNC: 2.1 MG/DL (ref 1.5–2.5)
MAGNESIUM SERPL-MCNC: 2.2 MG/DL (ref 1.5–2.5)
MAGNESIUM SERPL-MCNC: 2.4 MG/DL (ref 1.5–2.5)
MCH RBC QN AUTO: 30.7 PG (ref 27–33)
MCH RBC QN AUTO: 33.7 PG (ref 27–33)
MCH RBC QN AUTO: 33.9 PG (ref 27–33)
MCH RBC QN AUTO: 34 PG (ref 27–33)
MCH RBC QN AUTO: 34.1 PG (ref 27–33)
MCH RBC QN AUTO: 34.5 PG (ref 27–33)
MCH RBC QN AUTO: 34.5 PG (ref 27–33)
MCH RBC QN AUTO: 34.6 PG (ref 27–33)
MCH RBC QN AUTO: 34.6 PG (ref 27–33)
MCH RBC QN AUTO: 34.7 PG (ref 27–33)
MCH RBC QN AUTO: 34.8 PG (ref 27–33)
MCH RBC QN AUTO: 34.9 PG (ref 27–33)
MCHC RBC AUTO-ENTMCNC: 32.9 G/DL (ref 33.6–35)
MCHC RBC AUTO-ENTMCNC: 33.8 G/DL (ref 33.6–35)
MCHC RBC AUTO-ENTMCNC: 34.1 G/DL (ref 33.6–35)
MCHC RBC AUTO-ENTMCNC: 34.4 G/DL (ref 33.6–35)
MCHC RBC AUTO-ENTMCNC: 34.6 G/DL (ref 33.6–35)
MCHC RBC AUTO-ENTMCNC: 34.6 G/DL (ref 33.6–35)
MCHC RBC AUTO-ENTMCNC: 34.7 G/DL (ref 33.6–35)
MCHC RBC AUTO-ENTMCNC: 34.9 G/DL (ref 33.6–35)
MCHC RBC AUTO-ENTMCNC: 34.9 G/DL (ref 33.6–35)
MCHC RBC AUTO-ENTMCNC: 35.1 G/DL (ref 33.6–35)
MCHC RBC AUTO-ENTMCNC: 35.3 G/DL (ref 33.6–35)
MCHC RBC AUTO-ENTMCNC: 36.1 G/DL (ref 33.6–35)
MCV RBC AUTO: 100.2 FL (ref 81.4–97.8)
MCV RBC AUTO: 100.7 FL (ref 81.4–97.8)
MCV RBC AUTO: 93.2 FL (ref 81.4–97.8)
MCV RBC AUTO: 96.4 FL (ref 81.4–97.8)
MCV RBC AUTO: 98 FL (ref 81.4–97.8)
MCV RBC AUTO: 98 FL (ref 81.4–97.8)
MCV RBC AUTO: 98.6 FL (ref 81.4–97.8)
MCV RBC AUTO: 99 FL (ref 81.4–97.8)
MCV RBC AUTO: 99 FL (ref 81.4–97.8)
MCV RBC AUTO: 99.2 FL (ref 81.4–97.8)
MCV RBC AUTO: 99.5 FL (ref 81.4–97.8)
MCV RBC AUTO: 99.5 FL (ref 81.4–97.8)
MICRO URNS: ABNORMAL
MONOCYTES # BLD AUTO: 0.14 K/UL (ref 0–0.85)
MONOCYTES # BLD AUTO: 0.2 K/UL (ref 0–0.85)
MONOCYTES # BLD AUTO: 0.38 K/UL (ref 0–0.85)
MONOCYTES # BLD AUTO: 0.38 K/UL (ref 0–0.85)
MONOCYTES # BLD AUTO: 0.51 K/UL (ref 0–0.85)
MONOCYTES # BLD AUTO: 0.86 K/UL (ref 0–0.85)
MONOCYTES # BLD AUTO: 0.9 K/UL (ref 0–0.85)
MONOCYTES # BLD AUTO: 1.05 K/UL (ref 0–0.85)
MONOCYTES # BLD AUTO: 1.07 K/UL (ref 0–0.85)
MONOCYTES # BLD AUTO: 1.29 K/UL (ref 0–0.85)
MONOCYTES # BLD AUTO: 1.46 K/UL (ref 0–0.85)
MONOCYTES NFR BLD AUTO: 1.5 % (ref 0–13.4)
MONOCYTES NFR BLD AUTO: 11.6 % (ref 0–13.4)
MONOCYTES NFR BLD AUTO: 2.1 % (ref 0–13.4)
MONOCYTES NFR BLD AUTO: 2.9 % (ref 0–13.4)
MONOCYTES NFR BLD AUTO: 4.7 % (ref 0–13.4)
MONOCYTES NFR BLD AUTO: 4.8 % (ref 0–13.4)
MONOCYTES NFR BLD AUTO: 4.8 % (ref 0–13.4)
MONOCYTES NFR BLD AUTO: 6.4 % (ref 0–13.4)
MONOCYTES NFR BLD AUTO: 7.2 % (ref 0–13.4)
MONOCYTES NFR BLD AUTO: 8.3 % (ref 0–13.4)
MONOCYTES NFR BLD AUTO: 8.8 % (ref 0–13.4)
MYCOBACTERIUM SPEC CULT: NORMAL
NEUTROPHILS # BLD AUTO: 11.49 K/UL (ref 2–7.15)
NEUTROPHILS # BLD AUTO: 11.54 K/UL (ref 2–7.15)
NEUTROPHILS # BLD AUTO: 14.01 K/UL (ref 2–7.15)
NEUTROPHILS # BLD AUTO: 14.83 K/UL (ref 2–7.15)
NEUTROPHILS # BLD AUTO: 19.6 K/UL (ref 2–7.15)
NEUTROPHILS # BLD AUTO: 5.26 K/UL (ref 2–7.15)
NEUTROPHILS # BLD AUTO: 5.97 K/UL (ref 2–7.15)
NEUTROPHILS # BLD AUTO: 6.86 K/UL (ref 2–7.15)
NEUTROPHILS # BLD AUTO: 7.98 K/UL (ref 2–7.15)
NEUTROPHILS # BLD AUTO: 8.01 K/UL (ref 2–7.15)
NEUTROPHILS # BLD AUTO: 8.3 K/UL (ref 2–7.15)
NEUTROPHILS NFR BLD: 56.8 % (ref 44–72)
NEUTROPHILS NFR BLD: 65.9 % (ref 44–72)
NEUTROPHILS NFR BLD: 76.3 % (ref 44–72)
NEUTROPHILS NFR BLD: 76.7 % (ref 44–72)
NEUTROPHILS NFR BLD: 82.4 % (ref 44–72)
NEUTROPHILS NFR BLD: 83.1 % (ref 44–72)
NEUTROPHILS NFR BLD: 83.8 % (ref 44–72)
NEUTROPHILS NFR BLD: 85.5 % (ref 44–72)
NEUTROPHILS NFR BLD: 86.3 % (ref 44–72)
NEUTROPHILS NFR BLD: 87 % (ref 44–72)
NEUTROPHILS NFR BLD: 88.8 % (ref 44–72)
NITRITE UR QL STRIP.AUTO: NEGATIVE
NRBC # BLD AUTO: 0 K/UL
NRBC BLD-RTO: 0 /100 WBC
PH UR STRIP.AUTO: 6 [PH]
PLATELET # BLD AUTO: 280 K/UL (ref 164–446)
PLATELET # BLD AUTO: 302 K/UL (ref 164–446)
PLATELET # BLD AUTO: 307 K/UL (ref 164–446)
PLATELET # BLD AUTO: 326 K/UL (ref 164–446)
PLATELET # BLD AUTO: 340 K/UL (ref 164–446)
PLATELET # BLD AUTO: 358 K/UL (ref 164–446)
PLATELET # BLD AUTO: 363 K/UL (ref 164–446)
PLATELET # BLD AUTO: 372 K/UL (ref 164–446)
PLATELET # BLD AUTO: 377 K/UL (ref 164–446)
PLATELET # BLD AUTO: 387 K/UL (ref 164–446)
PLATELET # BLD AUTO: 395 K/UL (ref 164–446)
PLATELET # BLD AUTO: 400 K/UL (ref 164–446)
PLATELET # BLD AUTO: 498 K/UL (ref 164–446)
PMV BLD AUTO: 10.3 FL (ref 9–12.9)
PMV BLD AUTO: 10.6 FL (ref 9–12.9)
PMV BLD AUTO: 8.3 FL (ref 9–12.9)
PMV BLD AUTO: 8.9 FL (ref 9–12.9)
PMV BLD AUTO: 9 FL (ref 9–12.9)
PMV BLD AUTO: 9.2 FL (ref 9–12.9)
PMV BLD AUTO: 9.2 FL (ref 9–12.9)
PMV BLD AUTO: 9.6 FL (ref 9–12.9)
PMV BLD AUTO: 9.6 FL (ref 9–12.9)
PMV BLD AUTO: 9.7 FL (ref 9–12.9)
PMV BLD AUTO: 9.7 FL (ref 9–12.9)
PMV BLD AUTO: 9.8 FL (ref 9–12.9)
POTASSIUM SERPL-SCNC: 3.6 MMOL/L (ref 3.6–5.5)
POTASSIUM SERPL-SCNC: 3.6 MMOL/L (ref 3.6–5.5)
POTASSIUM SERPL-SCNC: 3.7 MMOL/L (ref 3.6–5.5)
POTASSIUM SERPL-SCNC: 3.7 MMOL/L (ref 3.6–5.5)
POTASSIUM SERPL-SCNC: 3.9 MMOL/L (ref 3.6–5.5)
POTASSIUM SERPL-SCNC: 4 MMOL/L (ref 3.6–5.5)
POTASSIUM SERPL-SCNC: 4.1 MMOL/L (ref 3.6–5.5)
POTASSIUM SERPL-SCNC: 4.3 MMOL/L (ref 3.6–5.5)
POTASSIUM SERPL-SCNC: 4.4 MMOL/L (ref 3.6–5.5)
POTASSIUM SERPL-SCNC: 4.4 MMOL/L (ref 3.6–5.5)
POTASSIUM SERPL-SCNC: 4.5 MMOL/L (ref 3.6–5.5)
POTASSIUM SERPL-SCNC: 4.6 MMOL/L (ref 3.6–5.5)
POTASSIUM SERPL-SCNC: 4.7 MMOL/L (ref 3.6–5.5)
POTASSIUM SERPL-SCNC: 5.2 MMOL/L (ref 3.6–5.5)
PROT SERPL-MCNC: 6.3 G/DL (ref 6–8.2)
PROT SERPL-MCNC: 6.3 G/DL (ref 6–8.2)
PROT SERPL-MCNC: 6.7 G/DL (ref 6–8.2)
PROT SERPL-MCNC: 6.8 G/DL (ref 6–8.2)
PROT SERPL-MCNC: 6.8 G/DL (ref 6–8.2)
PROT SERPL-MCNC: 6.9 G/DL (ref 6–8.2)
PROT SERPL-MCNC: 7 G/DL (ref 6–8.2)
PROT SERPL-MCNC: 7.2 G/DL (ref 6–8.2)
PROT SERPL-MCNC: 7.4 G/DL (ref 6–8.2)
PROT SERPL-MCNC: 7.5 G/DL (ref 6–8.2)
PROT UR QL STRIP: NEGATIVE MG/DL
PROTHROMBIN TIME: 12 SEC (ref 12–14.6)
PROTHROMBIN TIME: 12.1 SEC (ref 12–14.6)
PROTHROMBIN TIME: 12.3 SEC (ref 12–14.6)
RBC # BLD AUTO: 3.69 M/UL (ref 4.2–5.4)
RBC # BLD AUTO: 3.88 M/UL (ref 4.2–5.4)
RBC # BLD AUTO: 3.92 M/UL (ref 4.2–5.4)
RBC # BLD AUTO: 3.93 M/UL (ref 4.2–5.4)
RBC # BLD AUTO: 3.98 M/UL (ref 4.2–5.4)
RBC # BLD AUTO: 4.05 M/UL (ref 4.2–5.4)
RBC # BLD AUTO: 4.18 M/UL (ref 4.2–5.4)
RBC # BLD AUTO: 4.19 M/UL (ref 4.2–5.4)
RBC # BLD AUTO: 4.32 M/UL (ref 4.2–5.4)
RBC # BLD AUTO: 4.48 M/UL (ref 4.2–5.4)
RBC # BLD AUTO: 4.57 M/UL (ref 4.2–5.4)
RBC # BLD AUTO: 4.78 M/UL (ref 4.2–5.4)
RBC UR QL AUTO: NEGATIVE
RH BLD: NORMAL
RH BLD: NORMAL
RHODAMINE-AURAMINE STN SPEC: NORMAL
RHODAMINE-AURAMINE STN SPEC: NORMAL
SIGNIFICANT IND 70042: ABNORMAL
SIGNIFICANT IND 70042: NORMAL
SITE SITE: ABNORMAL
SITE SITE: NORMAL
SODIUM SERPL-SCNC: 131 MMOL/L (ref 135–145)
SODIUM SERPL-SCNC: 132 MMOL/L (ref 135–145)
SODIUM SERPL-SCNC: 133 MMOL/L (ref 135–145)
SODIUM SERPL-SCNC: 133 MMOL/L (ref 135–145)
SODIUM SERPL-SCNC: 134 MMOL/L (ref 135–145)
SODIUM SERPL-SCNC: 134 MMOL/L (ref 135–145)
SODIUM SERPL-SCNC: 135 MMOL/L (ref 135–145)
SODIUM SERPL-SCNC: 135 MMOL/L (ref 135–145)
SODIUM SERPL-SCNC: 136 MMOL/L (ref 135–145)
SODIUM SERPL-SCNC: 137 MMOL/L (ref 135–145)
SODIUM SERPL-SCNC: 137 MMOL/L (ref 135–145)
SODIUM SERPL-SCNC: 138 MMOL/L (ref 135–145)
SODIUM SERPL-SCNC: 138 MMOL/L (ref 135–145)
SODIUM SERPL-SCNC: 141 MMOL/L (ref 135–145)
SOURCE SOURCE: ABNORMAL
SOURCE SOURCE: NORMAL
SP GR UR STRIP.AUTO: 1.02
TEST NAME 95000: NORMAL
TROPONIN I SERPL-MCNC: 0.04 NG/ML (ref 0–0.04)
TROPONIN I SERPL-MCNC: <0.01 NG/ML (ref 0–0.04)
WBC # BLD AUTO: 10.4 K/UL (ref 4.8–10.8)
WBC # BLD AUTO: 10.8 K/UL (ref 4.8–10.8)
WBC # BLD AUTO: 11.4 K/UL (ref 4.8–10.8)
WBC # BLD AUTO: 13.2 K/UL (ref 4.8–10.8)
WBC # BLD AUTO: 14 K/UL (ref 4.8–10.8)
WBC # BLD AUTO: 16.7 K/UL (ref 4.8–10.8)
WBC # BLD AUTO: 17.9 K/UL (ref 4.8–10.8)
WBC # BLD AUTO: 22.1 K/UL (ref 4.8–10.8)
WBC # BLD AUTO: 7.8 K/UL (ref 4.8–10.8)
WBC # BLD AUTO: 9.3 K/UL (ref 4.8–10.8)
WBC # BLD AUTO: 9.3 K/UL (ref 4.8–10.8)
WBC # BLD AUTO: 9.4 K/UL (ref 4.8–10.8)

## 2018-01-01 PROCEDURE — 83735 ASSAY OF MAGNESIUM: CPT

## 2018-01-01 PROCEDURE — 77280 THER RAD SIMULAJ FIELD SMPL: CPT | Mod: 26 | Performed by: RADIOLOGY

## 2018-01-01 PROCEDURE — 71045 X-RAY EXAM CHEST 1 VIEW: CPT

## 2018-01-01 PROCEDURE — A6222 GAUZE <=16 IN NO W/SAL W/O B: HCPCS | Performed by: NEUROLOGICAL SURGERY

## 2018-01-01 PROCEDURE — S9126 HOSPICE CARE, IN THE HOME, P: HCPCS

## 2018-01-01 PROCEDURE — 6650990 HC HOSPICE AND HOME CARE RX REV CODE 0250

## 2018-01-01 PROCEDURE — 77470 SPECIAL RADIATION TREATMENT: CPT | Performed by: RADIOLOGY

## 2018-01-01 PROCEDURE — 77295 3-D RADIOTHERAPY PLAN: CPT | Performed by: RADIOLOGY

## 2018-01-01 PROCEDURE — 700117 HCHG RX CONTRAST REV CODE 255: Performed by: RADIOLOGY

## 2018-01-01 PROCEDURE — 80053 COMPREHEN METABOLIC PANEL: CPT

## 2018-01-01 PROCEDURE — 770001 HCHG ROOM/CARE - MED/SURG/GYN PRIV*

## 2018-01-01 PROCEDURE — 770022 HCHG ROOM/CARE - ICU (200)

## 2018-01-01 PROCEDURE — 700105 HCHG RX REV CODE 258: Performed by: STUDENT IN AN ORGANIZED HEALTH CARE EDUCATION/TRAINING PROGRAM

## 2018-01-01 PROCEDURE — 77334 RADIATION TREATMENT AID(S): CPT | Performed by: RADIOLOGY

## 2018-01-01 PROCEDURE — 99232 SBSQ HOSP IP/OBS MODERATE 35: CPT | Mod: GC | Performed by: INTERNAL MEDICINE

## 2018-01-01 PROCEDURE — A9270 NON-COVERED ITEM OR SERVICE: HCPCS | Performed by: PHYSICIAN ASSISTANT

## 2018-01-01 PROCEDURE — G0299 HHS/HOSPICE OF RN EA 15 MIN: HCPCS

## 2018-01-01 PROCEDURE — 84484 ASSAY OF TROPONIN QUANT: CPT

## 2018-01-01 PROCEDURE — 80048 BASIC METABOLIC PNL TOTAL CA: CPT

## 2018-01-01 PROCEDURE — G0155 HHCP-SVS OF CSW,EA 15 MIN: HCPCS

## 2018-01-01 PROCEDURE — 700102 HCHG RX REV CODE 250 W/ 637 OVERRIDE(OP): Performed by: STUDENT IN AN ORGANIZED HEALTH CARE EDUCATION/TRAINING PROGRAM

## 2018-01-01 PROCEDURE — 160042 HCHG SURGERY MINUTES - EA ADDL 1 MIN LEVEL 5: Performed by: NEUROLOGICAL SURGERY

## 2018-01-01 PROCEDURE — 77290 THER RAD SIMULAJ FIELD CPLX: CPT | Mod: 26 | Performed by: RADIOLOGY

## 2018-01-01 PROCEDURE — 85025 COMPLETE CBC W/AUTO DIFF WBC: CPT

## 2018-01-01 PROCEDURE — A9270 NON-COVERED ITEM OR SERVICE: HCPCS | Performed by: STUDENT IN AN ORGANIZED HEALTH CARE EDUCATION/TRAINING PROGRAM

## 2018-01-01 PROCEDURE — 77336 RADIATION PHYSICS CONSULT: CPT | Mod: XU | Performed by: RADIOLOGY

## 2018-01-01 PROCEDURE — 302978 HCHG BRONCHOSCOPY-DIAGNOSTIC

## 2018-01-01 PROCEDURE — 700102 HCHG RX REV CODE 250 W/ 637 OVERRIDE(OP): Performed by: INTERNAL MEDICINE

## 2018-01-01 PROCEDURE — A9270 NON-COVERED ITEM OR SERVICE: HCPCS | Performed by: INTERNAL MEDICINE

## 2018-01-01 PROCEDURE — 77412 RADIATION TX DELIVERY LVL 3: CPT | Performed by: RADIOLOGY

## 2018-01-01 PROCEDURE — 700111 HCHG RX REV CODE 636 W/ 250 OVERRIDE (IP)

## 2018-01-01 PROCEDURE — 700111 HCHG RX REV CODE 636 W/ 250 OVERRIDE (IP): Performed by: PHYSICIAN ASSISTANT

## 2018-01-01 PROCEDURE — 81003 URINALYSIS AUTO W/O SCOPE: CPT

## 2018-01-01 PROCEDURE — 99284 EMERGENCY DEPT VISIT MOD MDM: CPT

## 2018-01-01 PROCEDURE — 160002 HCHG RECOVERY MINUTES (STAT)

## 2018-01-01 PROCEDURE — 70450 CT HEAD/BRAIN W/O DYE: CPT

## 2018-01-01 PROCEDURE — 88104 CYTOPATH FL NONGYN SMEARS: CPT

## 2018-01-01 PROCEDURE — 500889 HCHG PACK, NEURO: Performed by: NEUROLOGICAL SURGERY

## 2018-01-01 PROCEDURE — 99212 OFFICE O/P EST SF 10 MIN: CPT | Performed by: RADIOLOGY

## 2018-01-01 PROCEDURE — 700102 HCHG RX REV CODE 250 W/ 637 OVERRIDE(OP): Performed by: PHYSICIAN ASSISTANT

## 2018-01-01 PROCEDURE — 90471 IMMUNIZATION ADMIN: CPT

## 2018-01-01 PROCEDURE — C1713 ANCHOR/SCREW BN/BN,TIS/BN: HCPCS | Performed by: NEUROLOGICAL SURGERY

## 2018-01-01 PROCEDURE — 700111 HCHG RX REV CODE 636 W/ 250 OVERRIDE (IP): Performed by: EMERGENCY MEDICINE

## 2018-01-01 PROCEDURE — 85610 PROTHROMBIN TIME: CPT

## 2018-01-01 PROCEDURE — 700111 HCHG RX REV CODE 636 W/ 250 OVERRIDE (IP): Performed by: STUDENT IN AN ORGANIZED HEALTH CARE EDUCATION/TRAINING PROGRAM

## 2018-01-01 PROCEDURE — 77295 3-D RADIOTHERAPY PLAN: CPT | Mod: 26 | Performed by: RADIOLOGY

## 2018-01-01 PROCEDURE — 77263 THER RADIOLOGY TX PLNG CPLX: CPT | Performed by: RADIOLOGY

## 2018-01-01 PROCEDURE — 700111 HCHG RX REV CODE 636 W/ 250 OVERRIDE (IP): Performed by: INTERNAL MEDICINE

## 2018-01-01 PROCEDURE — 99233 SBSQ HOSP IP/OBS HIGH 50: CPT | Mod: GC | Performed by: INTERNAL MEDICINE

## 2018-01-01 PROCEDURE — G8988 SELF CARE GOAL STATUS: HCPCS | Mod: CH

## 2018-01-01 PROCEDURE — 88313 SPECIAL STAINS GROUP 2: CPT

## 2018-01-01 PROCEDURE — 77470 SPECIAL RADIATION TREATMENT: CPT | Mod: 26 | Performed by: RADIOLOGY

## 2018-01-01 PROCEDURE — 99223 1ST HOSP IP/OBS HIGH 75: CPT | Mod: AI,GC | Performed by: INTERNAL MEDICINE

## 2018-01-01 PROCEDURE — 99153 MOD SED SAME PHYS/QHP EA: CPT | Performed by: INTERNAL MEDICINE

## 2018-01-01 PROCEDURE — 77280 THER RAD SIMULAJ FIELD SMPL: CPT | Performed by: RADIOLOGY

## 2018-01-01 PROCEDURE — A9270 NON-COVERED ITEM OR SERVICE: HCPCS

## 2018-01-01 PROCEDURE — 70553 MRI BRAIN STEM W/O & W/DYE: CPT

## 2018-01-01 PROCEDURE — 110454 HCHG SHELL REV 250: Performed by: NEUROLOGICAL SURGERY

## 2018-01-01 PROCEDURE — 93306 TTE W/DOPPLER COMPLETE: CPT | Mod: 26 | Performed by: INTERNAL MEDICINE

## 2018-01-01 PROCEDURE — 700105 HCHG RX REV CODE 258: Performed by: EMERGENCY MEDICINE

## 2018-01-01 PROCEDURE — 97162 PT EVAL MOD COMPLEX 30 MIN: CPT

## 2018-01-01 PROCEDURE — 99239 HOSP IP/OBS DSCHRG MGMT >30: CPT | Mod: GC | Performed by: INTERNAL MEDICINE

## 2018-01-01 PROCEDURE — 77300 RADIATION THERAPY DOSE PLAN: CPT | Mod: 26 | Performed by: RADIOLOGY

## 2018-01-01 PROCEDURE — 77334 RADIATION TREATMENT AID(S): CPT | Mod: 26 | Performed by: RADIOLOGY

## 2018-01-01 PROCEDURE — 87205 SMEAR GRAM STAIN: CPT

## 2018-01-01 PROCEDURE — 500440 HCHG DRESSING, STERILE ROLL (KERLIX): Performed by: NEUROLOGICAL SURGERY

## 2018-01-01 PROCEDURE — 77300 RADIATION THERAPY DOSE PLAN: CPT | Performed by: RADIOLOGY

## 2018-01-01 PROCEDURE — 77290 THER RAD SIMULAJ FIELD CPLX: CPT | Performed by: RADIOLOGY

## 2018-01-01 PROCEDURE — 88112 CYTOPATH CELL ENHANCE TECH: CPT

## 2018-01-01 PROCEDURE — 160048 HCHG OR STATISTICAL LEVEL 1-5: Performed by: INTERNAL MEDICINE

## 2018-01-01 PROCEDURE — 77370 RADIATION PHYSICS CONSULT: CPT | Performed by: RADIOLOGY

## 2018-01-01 PROCEDURE — A9270 NON-COVERED ITEM OR SERVICE: HCPCS | Performed by: EMERGENCY MEDICINE

## 2018-01-01 PROCEDURE — 0BD48ZX EXTRACTION OF RIGHT UPPER LOBE BRONCHUS, VIA NATURAL OR ARTIFICIAL OPENING ENDOSCOPIC, DIAGNOSTIC: ICD-10-PCS | Performed by: INTERNAL MEDICINE

## 2018-01-01 PROCEDURE — 87206 SMEAR FLUORESCENT/ACID STAI: CPT

## 2018-01-01 PROCEDURE — 77336 RADIATION PHYSICS CONSULT: CPT | Performed by: RADIOLOGY

## 2018-01-01 PROCEDURE — 97165 OT EVAL LOW COMPLEX 30 MIN: CPT

## 2018-01-01 PROCEDURE — 85027 COMPLETE CBC AUTOMATED: CPT

## 2018-01-01 PROCEDURE — 36415 COLL VENOUS BLD VENIPUNCTURE: CPT

## 2018-01-01 PROCEDURE — 770006 HCHG ROOM/CARE - MED/SURG/GYN SEMI*

## 2018-01-01 PROCEDURE — 85730 THROMBOPLASTIN TIME PARTIAL: CPT

## 2018-01-01 PROCEDURE — 500331 HCHG COTTONOID, SURG PATTIE: Performed by: NEUROLOGICAL SURGERY

## 2018-01-01 PROCEDURE — 88342 IMHCHEM/IMCYTCHM 1ST ANTB: CPT

## 2018-01-01 PROCEDURE — 88341 IMHCHEM/IMCYTCHM EA ADD ANTB: CPT | Mod: 91

## 2018-01-01 PROCEDURE — 87186 SC STD MICRODIL/AGAR DIL: CPT

## 2018-01-01 PROCEDURE — G8979 MOBILITY GOAL STATUS: HCPCS | Mod: CI

## 2018-01-01 PROCEDURE — 160002 HCHG RECOVERY MINUTES (STAT): Performed by: NEUROLOGICAL SURGERY

## 2018-01-01 PROCEDURE — 77432 STEREOTACTIC RADIATION TRMT: CPT | Performed by: RADIOLOGY

## 2018-01-01 PROCEDURE — 71260 CT THORAX DX C+: CPT

## 2018-01-01 PROCEDURE — 700101 HCHG RX REV CODE 250

## 2018-01-01 PROCEDURE — 700117 HCHG RX CONTRAST REV CODE 255: Performed by: STUDENT IN AN ORGANIZED HEALTH CARE EDUCATION/TRAINING PROGRAM

## 2018-01-01 PROCEDURE — 85025 COMPLETE CBC W/AUTO DIFF WBC: CPT | Mod: 91

## 2018-01-01 PROCEDURE — 90670 PCV13 VACCINE IM: CPT | Performed by: INTERNAL MEDICINE

## 2018-01-01 PROCEDURE — 160009 HCHG ANES TIME/MIN: Performed by: NEUROLOGICAL SURGERY

## 2018-01-01 PROCEDURE — 99223 1ST HOSP IP/OBS HIGH 75: CPT | Performed by: RADIOLOGY

## 2018-01-01 PROCEDURE — 93306 TTE W/DOPPLER COMPLETE: CPT

## 2018-01-01 PROCEDURE — A9585 GADOBUTROL INJECTION: HCPCS | Performed by: RADIOLOGY

## 2018-01-01 PROCEDURE — 86900 BLOOD TYPING SEROLOGIC ABO: CPT

## 2018-01-01 PROCEDURE — A9579 GAD-BASE MR CONTRAST NOS,1ML: HCPCS | Performed by: INTERNAL MEDICINE

## 2018-01-01 PROCEDURE — 500303 HCHG CLIP, SCALP: Performed by: NEUROLOGICAL SURGERY

## 2018-01-01 PROCEDURE — G8987 SELF CARE CURRENT STATUS: HCPCS | Mod: CI

## 2018-01-01 PROCEDURE — 700117 HCHG RX CONTRAST REV CODE 255: Performed by: INTERNAL MEDICINE

## 2018-01-01 PROCEDURE — 501838 HCHG SUTURE GENERAL: Performed by: NEUROLOGICAL SURGERY

## 2018-01-01 PROCEDURE — 87015 SPECIMEN INFECT AGNT CONCNTJ: CPT

## 2018-01-01 PROCEDURE — 00B70ZZ EXCISION OF CEREBRAL HEMISPHERE, OPEN APPROACH: ICD-10-PCS | Performed by: NEUROLOGICAL SURGERY

## 2018-01-01 PROCEDURE — 85730 THROMBOPLASTIN TIME PARTIAL: CPT | Mod: 91

## 2018-01-01 PROCEDURE — 99153 MOD SED SAME PHYS/QHP EA: CPT

## 2018-01-01 PROCEDURE — 87102 FUNGUS ISOLATION CULTURE: CPT

## 2018-01-01 PROCEDURE — C1725 CATH, TRANSLUMIN NON-LASER: HCPCS | Performed by: NEUROLOGICAL SURGERY

## 2018-01-01 PROCEDURE — A9579 GAD-BASE MR CONTRAST NOS,1ML: HCPCS | Performed by: STUDENT IN AN ORGANIZED HEALTH CARE EDUCATION/TRAINING PROGRAM

## 2018-01-01 PROCEDURE — 88307 TISSUE EXAM BY PATHOLOGIST: CPT

## 2018-01-01 PROCEDURE — 96374 THER/PROPH/DIAG INJ IV PUSH: CPT

## 2018-01-01 PROCEDURE — 160048 HCHG OR STATISTICAL LEVEL 1-5: Performed by: NEUROLOGICAL SURGERY

## 2018-01-01 PROCEDURE — 700117 HCHG RX CONTRAST REV CODE 255: Performed by: EMERGENCY MEDICINE

## 2018-01-01 PROCEDURE — 87116 MYCOBACTERIA CULTURE: CPT

## 2018-01-01 PROCEDURE — 88360 TUMOR IMMUNOHISTOCHEM/MANUAL: CPT

## 2018-01-01 PROCEDURE — 77373 STRTCTC BDY RAD THER TX DLVR: CPT | Performed by: RADIOLOGY

## 2018-01-01 PROCEDURE — 500122 HCHG BOVIE, BLADE: Performed by: NEUROLOGICAL SURGERY

## 2018-01-01 PROCEDURE — 501445 HCHG STAPLER, SKIN DISP: Performed by: NEUROLOGICAL SURGERY

## 2018-01-01 PROCEDURE — 3E0234Z INTRODUCTION OF SERUM, TOXOID AND VACCINE INTO MUSCLE, PERCUTANEOUS APPROACH: ICD-10-PCS | Performed by: INTERNAL MEDICINE

## 2018-01-01 PROCEDURE — 700101 HCHG RX REV CODE 250: Performed by: INTERNAL MEDICINE

## 2018-01-01 PROCEDURE — A9552 F18 FDG: HCPCS

## 2018-01-01 PROCEDURE — 160035 HCHG PACU - 1ST 60 MINS PHASE I: Performed by: NEUROLOGICAL SURGERY

## 2018-01-01 PROCEDURE — 93005 ELECTROCARDIOGRAM TRACING: CPT | Performed by: EMERGENCY MEDICINE

## 2018-01-01 PROCEDURE — 87077 CULTURE AEROBIC IDENTIFY: CPT

## 2018-01-01 PROCEDURE — 665036 HSPC NOTICE OF ELECTION NOE

## 2018-01-01 PROCEDURE — 302135 SEQUENTIAL COMPRESSION MACHINE: Performed by: INTERNAL MEDICINE

## 2018-01-01 PROCEDURE — 85049 AUTOMATED PLATELET COUNT: CPT

## 2018-01-01 PROCEDURE — 88305 TISSUE EXAM BY PATHOLOGIST: CPT | Mod: 59

## 2018-01-01 PROCEDURE — 97535 SELF CARE MNGMENT TRAINING: CPT

## 2018-01-01 PROCEDURE — 700102 HCHG RX REV CODE 250 W/ 637 OVERRIDE(OP): Performed by: EMERGENCY MEDICINE

## 2018-01-01 PROCEDURE — 96375 TX/PRO/DX INJ NEW DRUG ADDON: CPT

## 2018-01-01 PROCEDURE — G8978 MOBILITY CURRENT STATUS: HCPCS | Mod: CJ

## 2018-01-01 PROCEDURE — 77431 RADIATION THERAPY MANAGEMENT: CPT | Performed by: RADIOLOGY

## 2018-01-01 PROCEDURE — 700102 HCHG RX REV CODE 250 W/ 637 OVERRIDE(OP)

## 2018-01-01 PROCEDURE — A9579 GAD-BASE MR CONTRAST NOS,1ML: HCPCS | Performed by: EMERGENCY MEDICINE

## 2018-01-01 PROCEDURE — 86850 RBC ANTIBODY SCREEN: CPT

## 2018-01-01 PROCEDURE — 86901 BLOOD TYPING SEROLOGIC RH(D): CPT

## 2018-01-01 PROCEDURE — 700105 HCHG RX REV CODE 258: Performed by: INTERNAL MEDICINE

## 2018-01-01 PROCEDURE — 99152 MOD SED SAME PHYS/QHP 5/>YRS: CPT | Performed by: INTERNAL MEDICINE

## 2018-01-01 PROCEDURE — A9579 GAD-BASE MR CONTRAST NOS,1ML: HCPCS | Performed by: RADIOLOGY

## 2018-01-01 PROCEDURE — 99285 EMERGENCY DEPT VISIT HI MDM: CPT

## 2018-01-01 PROCEDURE — 87070 CULTURE OTHR SPECIMN AEROBIC: CPT

## 2018-01-01 PROCEDURE — 77372 SRS LINEAR BASED: CPT | Performed by: RADIOLOGY

## 2018-01-01 PROCEDURE — 88331 PATH CONSLTJ SURG 1 BLK 1SPC: CPT

## 2018-01-01 PROCEDURE — 160036 HCHG PACU - EA ADDL 30 MINS PHASE I: Performed by: NEUROLOGICAL SURGERY

## 2018-01-01 PROCEDURE — 500075 HCHG BLADE, CLIPPER NEURO: Performed by: NEUROLOGICAL SURGERY

## 2018-01-01 PROCEDURE — 500445 HCHG HEMOSTAT, SURGICEL 4X8: Performed by: NEUROLOGICAL SURGERY

## 2018-01-01 PROCEDURE — 77435 SBRT MANAGEMENT: CPT | Performed by: RADIOLOGY

## 2018-01-01 PROCEDURE — 160031 HCHG SURGERY MINUTES - 1ST 30 MINS LEVEL 5: Performed by: NEUROLOGICAL SURGERY

## 2018-01-01 DEVICE — GRAFT DURAMATRIX ONLAY PLUS 3IN X 3IN OR 7.5CM X 7.5CM: Type: IMPLANTABLE DEVICE | Status: FUNCTIONAL

## 2018-01-01 DEVICE — PLATE NC BURR HOLE COVER 10MM (6NCX6=36): Type: IMPLANTABLE DEVICE | Status: FUNCTIONAL

## 2018-01-01 DEVICE — SCREW STRYK NC 1.5X4MM (6NCX40=240) CONSIGNED QTY 240 PRE-LOAD 80/PK: Type: IMPLANTABLE DEVICE | Status: FUNCTIONAL

## 2018-01-01 DEVICE — PLATE NC DOGBONE 2-HOLE W/ TAB (6NCX4=24): Type: IMPLANTABLE DEVICE | Status: FUNCTIONAL

## 2018-01-01 RX ORDER — ONDANSETRON 2 MG/ML
4 INJECTION INTRAMUSCULAR; INTRAVENOUS EVERY 4 HOURS PRN
Status: DISCONTINUED | OUTPATIENT
Start: 2018-01-01 | End: 2018-01-01 | Stop reason: HOSPADM

## 2018-01-01 RX ORDER — MAGNESIUM SULFATE HEPTAHYDRATE 40 MG/ML
2 INJECTION, SOLUTION INTRAVENOUS ONCE
Status: COMPLETED | OUTPATIENT
Start: 2018-01-01 | End: 2018-01-01

## 2018-01-01 RX ORDER — SODIUM CHLORIDE 9 MG/ML
INJECTION, SOLUTION INTRAVENOUS CONTINUOUS
Status: DISCONTINUED | OUTPATIENT
Start: 2018-01-01 | End: 2018-01-01

## 2018-01-01 RX ORDER — MORPHINE SULFATE 15 MG/1
15 TABLET ORAL EVERY 4 HOURS PRN
Qty: 30 TAB | Refills: 0 | Status: SHIPPED | OUTPATIENT
Start: 2018-01-01 | End: 2018-01-01

## 2018-01-01 RX ORDER — BISACODYL 10 MG
10 SUPPOSITORY, RECTAL RECTAL
Status: DISCONTINUED | OUTPATIENT
Start: 2018-01-01 | End: 2018-01-01 | Stop reason: HOSPADM

## 2018-01-01 RX ORDER — GADOBUTROL 604.72 MG/ML
7.5 INJECTION INTRAVENOUS ONCE
Status: COMPLETED | OUTPATIENT
Start: 2018-01-01 | End: 2018-01-01

## 2018-01-01 RX ORDER — ONDANSETRON 2 MG/ML
4 INJECTION INTRAMUSCULAR; INTRAVENOUS PRN
Status: ACTIVE | OUTPATIENT
Start: 2018-01-01 | End: 2018-01-01

## 2018-01-01 RX ORDER — ONDANSETRON 2 MG/ML
4 INJECTION INTRAMUSCULAR; INTRAVENOUS EVERY 8 HOURS PRN
Status: DISCONTINUED | OUTPATIENT
Start: 2018-01-01 | End: 2018-01-01 | Stop reason: HOSPADM

## 2018-01-01 RX ORDER — LEVETIRACETAM 500 MG/1
500 TABLET ORAL 2 TIMES DAILY
Status: DISCONTINUED | OUTPATIENT
Start: 2018-01-01 | End: 2018-01-01 | Stop reason: HOSPADM

## 2018-01-01 RX ORDER — SODIUM CHLORIDE 9 MG/ML
500 INJECTION, SOLUTION INTRAVENOUS
Status: ACTIVE | OUTPATIENT
Start: 2018-01-01 | End: 2018-01-01

## 2018-01-01 RX ORDER — LABETALOL HYDROCHLORIDE 5 MG/ML
10 INJECTION, SOLUTION INTRAVENOUS EVERY 4 HOURS PRN
Status: DISCONTINUED | OUTPATIENT
Start: 2018-01-01 | End: 2018-01-01 | Stop reason: HOSPADM

## 2018-01-01 RX ORDER — MORPHINE SULFATE 4 MG/ML
4 INJECTION, SOLUTION INTRAMUSCULAR; INTRAVENOUS
Status: DISCONTINUED | OUTPATIENT
Start: 2018-01-01 | End: 2018-01-01 | Stop reason: HOSPADM

## 2018-01-01 RX ORDER — AMLODIPINE BESYLATE 5 MG/1
5 TABLET ORAL
Status: DISCONTINUED | OUTPATIENT
Start: 2018-01-01 | End: 2018-01-01 | Stop reason: HOSPADM

## 2018-01-01 RX ORDER — LEVETIRACETAM 500 MG/1
500 TABLET ORAL 2 TIMES DAILY
COMMUNITY

## 2018-01-01 RX ORDER — CEFAZOLIN SODIUM 2 G/100ML
2 INJECTION, SOLUTION INTRAVENOUS EVERY 8 HOURS
Status: DISCONTINUED | OUTPATIENT
Start: 2018-01-01 | End: 2018-01-01

## 2018-01-01 RX ORDER — DEXAMETHASONE SODIUM PHOSPHATE 4 MG/ML
4 INJECTION, SOLUTION INTRA-ARTICULAR; INTRALESIONAL; INTRAMUSCULAR; INTRAVENOUS; SOFT TISSUE EVERY 6 HOURS
Status: DISCONTINUED | OUTPATIENT
Start: 2018-01-01 | End: 2018-01-01

## 2018-01-01 RX ORDER — HYDRALAZINE HYDROCHLORIDE 20 MG/ML
10-20 INJECTION INTRAMUSCULAR; INTRAVENOUS EVERY 6 HOURS PRN
Status: DISCONTINUED | OUTPATIENT
Start: 2018-01-01 | End: 2018-01-01 | Stop reason: HOSPADM

## 2018-01-01 RX ORDER — OXYCODONE AND ACETAMINOPHEN 10; 325 MG/1; MG/1
2 TABLET ORAL ONCE
Status: COMPLETED | OUTPATIENT
Start: 2018-01-01 | End: 2018-01-01

## 2018-01-01 RX ORDER — LORAZEPAM 2 MG/ML
0.5 INJECTION INTRAMUSCULAR ONCE
Status: COMPLETED | OUTPATIENT
Start: 2018-01-01 | End: 2018-01-01

## 2018-01-01 RX ORDER — ESCITALOPRAM OXALATE 10 MG/1
20 TABLET ORAL DAILY
Status: DISCONTINUED | OUTPATIENT
Start: 2018-01-01 | End: 2018-01-01 | Stop reason: HOSPADM

## 2018-01-01 RX ORDER — LORAZEPAM 2 MG/ML
0.5 INJECTION INTRAMUSCULAR ONCE
Status: DISCONTINUED | OUTPATIENT
Start: 2018-01-01 | End: 2018-01-01

## 2018-01-01 RX ORDER — ONDANSETRON 2 MG/ML
4 INJECTION INTRAMUSCULAR; INTRAVENOUS ONCE
Status: DISCONTINUED | OUTPATIENT
Start: 2018-01-01 | End: 2018-01-01

## 2018-01-01 RX ORDER — SODIUM CHLORIDE 1 G/1
2 TABLET ORAL ONCE
Status: COMPLETED | OUTPATIENT
Start: 2018-01-01 | End: 2018-01-01

## 2018-01-01 RX ORDER — OXYCODONE HYDROCHLORIDE 10 MG/1
10 TABLET ORAL EVERY 6 HOURS PRN
Qty: 15 TAB | Refills: 0 | Status: SHIPPED | OUTPATIENT
Start: 2018-01-01 | End: 2018-01-01

## 2018-01-01 RX ORDER — DEXAMETHASONE 4 MG/1
4 TABLET ORAL EVERY 6 HOURS
Status: DISCONTINUED | OUTPATIENT
Start: 2018-01-01 | End: 2018-01-01

## 2018-01-01 RX ORDER — HEPARIN SODIUM 5000 [USP'U]/ML
5000 INJECTION, SOLUTION INTRAVENOUS; SUBCUTANEOUS EVERY 8 HOURS
Status: DISCONTINUED | OUTPATIENT
Start: 2018-01-01 | End: 2018-01-01 | Stop reason: HOSPADM

## 2018-01-01 RX ORDER — SODIUM CHLORIDE 1 G/1
1 TABLET ORAL 2 TIMES DAILY
Qty: 14 TAB | Refills: 0 | OUTPATIENT
Start: 2018-01-01 | End: 2018-01-01

## 2018-01-01 RX ORDER — AZITHROMYCIN 250 MG/1
TABLET, FILM COATED ORAL
Qty: 6 TAB | Refills: 0 | Status: SHIPPED | OUTPATIENT
Start: 2018-01-01 | End: 2019-01-01

## 2018-01-01 RX ORDER — MIDAZOLAM HYDROCHLORIDE 1 MG/ML
.5-2 INJECTION INTRAMUSCULAR; INTRAVENOUS PRN
Status: ACTIVE | OUTPATIENT
Start: 2018-01-01 | End: 2018-01-01

## 2018-01-01 RX ORDER — DEXAMETHASONE SODIUM PHOSPHATE 4 MG/ML
4 INJECTION, SOLUTION INTRA-ARTICULAR; INTRALESIONAL; INTRAMUSCULAR; INTRAVENOUS; SOFT TISSUE EVERY 8 HOURS
Status: DISCONTINUED | OUTPATIENT
Start: 2018-01-01 | End: 2018-01-01 | Stop reason: HOSPADM

## 2018-01-01 RX ORDER — BUPIVACAINE HYDROCHLORIDE AND EPINEPHRINE 5; 5 MG/ML; UG/ML
INJECTION, SOLUTION EPIDURAL; INTRACAUDAL; PERINEURAL
Status: DISCONTINUED | OUTPATIENT
Start: 2018-01-01 | End: 2018-01-01 | Stop reason: HOSPADM

## 2018-01-01 RX ORDER — ASCORBIC ACID 500 MG
500 TABLET ORAL DAILY
COMMUNITY
End: 2018-01-01

## 2018-01-01 RX ORDER — OXYCODONE HYDROCHLORIDE 5 MG/1
5 TABLET ORAL EVERY 4 HOURS PRN
Status: DISCONTINUED | OUTPATIENT
Start: 2018-01-01 | End: 2018-01-01 | Stop reason: HOSPADM

## 2018-01-01 RX ORDER — MIDAZOLAM HYDROCHLORIDE 1 MG/ML
INJECTION INTRAMUSCULAR; INTRAVENOUS
Status: DISCONTINUED | OUTPATIENT
Start: 2018-01-01 | End: 2018-01-01 | Stop reason: HOSPADM

## 2018-01-01 RX ORDER — LABETALOL HYDROCHLORIDE 5 MG/ML
INJECTION, SOLUTION INTRAVENOUS
Status: COMPLETED
Start: 2018-01-01 | End: 2018-01-01

## 2018-01-01 RX ORDER — DEXAMETHASONE 4 MG/1
TABLET ORAL
Qty: 14 TAB | Refills: 0 | OUTPATIENT
Start: 2018-01-01 | End: 2018-01-01

## 2018-01-01 RX ORDER — OXYCODONE HCL 5 MG/5 ML
SOLUTION, ORAL ORAL
Status: COMPLETED
Start: 2018-01-01 | End: 2018-01-01

## 2018-01-01 RX ORDER — SODIUM CHLORIDE, SODIUM LACTATE, POTASSIUM CHLORIDE, AND CALCIUM CHLORIDE .6; .31; .03; .02 G/100ML; G/100ML; G/100ML; G/100ML
IRRIGANT IRRIGATION
Status: DISCONTINUED | OUTPATIENT
Start: 2018-01-01 | End: 2018-01-01 | Stop reason: HOSPADM

## 2018-01-01 RX ORDER — LISINOPRIL 20 MG/1
20 TABLET ORAL DAILY
Status: DISCONTINUED | OUTPATIENT
Start: 2018-01-01 | End: 2018-01-01

## 2018-01-01 RX ORDER — LEVETIRACETAM 500 MG/1
500 TABLET ORAL 2 TIMES DAILY
Qty: 60 TAB | Refills: 0 | OUTPATIENT
Start: 2018-01-01 | End: 2018-01-01

## 2018-01-01 RX ORDER — OXYCODONE HYDROCHLORIDE 10 MG/1
10 TABLET ORAL EVERY 4 HOURS PRN
Status: DISCONTINUED | OUTPATIENT
Start: 2018-01-01 | End: 2018-01-01 | Stop reason: HOSPADM

## 2018-01-01 RX ORDER — LORAZEPAM 2 MG/ML
0.5 INJECTION INTRAMUSCULAR EVERY 6 HOURS PRN
Status: DISCONTINUED | OUTPATIENT
Start: 2018-01-01 | End: 2018-01-01

## 2018-01-01 RX ORDER — MIDAZOLAM HYDROCHLORIDE 1 MG/ML
INJECTION INTRAMUSCULAR; INTRAVENOUS
Status: COMPLETED
Start: 2018-01-01 | End: 2018-01-01

## 2018-01-01 RX ORDER — HEPARIN SODIUM 5000 [USP'U]/ML
5000 INJECTION, SOLUTION INTRAVENOUS; SUBCUTANEOUS EVERY 12 HOURS
Status: COMPLETED | OUTPATIENT
Start: 2018-01-01 | End: 2018-01-01

## 2018-01-01 RX ORDER — NICOTINE 21 MG/24HR
14 PATCH, TRANSDERMAL 24 HOURS TRANSDERMAL
Status: DISCONTINUED | OUTPATIENT
Start: 2018-01-01 | End: 2018-01-01 | Stop reason: HOSPADM

## 2018-01-01 RX ORDER — PANTOPRAZOLE SODIUM 40 MG/1
40 TABLET, DELAYED RELEASE ORAL DAILY
Status: DISCONTINUED | OUTPATIENT
Start: 2018-01-01 | End: 2018-01-01

## 2018-01-01 RX ORDER — METHYLPREDNISOLONE 4 MG/1
TABLET ORAL
Qty: 1 KIT | Refills: 0 | OUTPATIENT
Start: 2018-01-01 | End: 2018-01-01

## 2018-01-01 RX ORDER — SODIUM CHLORIDE 9 MG/ML
INJECTION, SOLUTION INTRAVENOUS
Status: DISCONTINUED | OUTPATIENT
Start: 2018-01-01 | End: 2018-01-01 | Stop reason: HOSPADM

## 2018-01-01 RX ORDER — SODIUM CHLORIDE 1 G/1
2 TABLET ORAL
Status: DISCONTINUED | OUTPATIENT
Start: 2018-01-01 | End: 2018-01-01 | Stop reason: HOSPADM

## 2018-01-01 RX ORDER — AMLODIPINE BESYLATE 5 MG/1
5 TABLET ORAL EVERY EVENING
COMMUNITY
End: 2018-01-01 | Stop reason: ALTCHOICE

## 2018-01-01 RX ORDER — BUTALBITAL, ACETAMINOPHEN AND CAFFEINE 50; 325; 40 MG/1; MG/1; MG/1
1 TABLET ORAL EVERY 6 HOURS PRN
Status: DISCONTINUED | OUTPATIENT
Start: 2018-01-01 | End: 2018-01-01 | Stop reason: HOSPADM

## 2018-01-01 RX ORDER — CYCLOBENZAPRINE HCL 10 MG
10 TABLET ORAL 3 TIMES DAILY PRN
COMMUNITY
End: 2018-01-01 | Stop reason: ALTCHOICE

## 2018-01-01 RX ORDER — TRAZODONE HYDROCHLORIDE 100 MG/1
50 TABLET ORAL ONCE
Status: COMPLETED | OUTPATIENT
Start: 2018-01-01 | End: 2018-01-01

## 2018-01-01 RX ORDER — ACETAMINOPHEN 325 MG/1
650 TABLET ORAL EVERY 6 HOURS PRN
Status: DISCONTINUED | OUTPATIENT
Start: 2018-01-01 | End: 2018-01-01 | Stop reason: HOSPADM

## 2018-01-01 RX ORDER — LORAZEPAM 2 MG/ML
0.5 INJECTION INTRAMUSCULAR
Status: COMPLETED | OUTPATIENT
Start: 2018-01-01 | End: 2018-01-01

## 2018-01-01 RX ORDER — OXYCODONE HYDROCHLORIDE 5 MG/1
10 TABLET ORAL
Status: DISCONTINUED | OUTPATIENT
Start: 2018-01-01 | End: 2018-01-01 | Stop reason: HOSPADM

## 2018-01-01 RX ORDER — LEVETIRACETAM 500 MG/1
500 TABLET ORAL 2 TIMES DAILY
Status: DISCONTINUED | OUTPATIENT
Start: 2018-01-01 | End: 2018-01-01

## 2018-01-01 RX ORDER — OMEPRAZOLE 20 MG/1
20 CAPSULE, DELAYED RELEASE ORAL DAILY
Status: DISCONTINUED | OUTPATIENT
Start: 2018-01-01 | End: 2018-01-01 | Stop reason: HOSPADM

## 2018-01-01 RX ORDER — OXYCODONE HYDROCHLORIDE 5 MG/1
5 TABLET ORAL
Status: DISCONTINUED | OUTPATIENT
Start: 2018-01-01 | End: 2018-01-01 | Stop reason: HOSPADM

## 2018-01-01 RX ORDER — OXYCODONE AND ACETAMINOPHEN 10; 325 MG/1; MG/1
1-2 TABLET ORAL EVERY 6 HOURS PRN
COMMUNITY
Start: 2018-01-01

## 2018-01-01 RX ORDER — AMOXICILLIN 250 MG
2 CAPSULE ORAL 2 TIMES DAILY
Status: DISCONTINUED | OUTPATIENT
Start: 2018-01-01 | End: 2018-01-01 | Stop reason: HOSPADM

## 2018-01-01 RX ORDER — AMLODIPINE BESYLATE 5 MG/1
5 TABLET ORAL DAILY
Qty: 30 TAB | Refills: 0 | OUTPATIENT
Start: 2018-01-01 | End: 2018-01-01

## 2018-01-01 RX ORDER — SODIUM CHLORIDE 1 G/1
1 TABLET ORAL
Status: DISCONTINUED | OUTPATIENT
Start: 2018-01-01 | End: 2018-01-01

## 2018-01-01 RX ORDER — BUTALBITAL, ACETAMINOPHEN AND CAFFEINE 50; 325; 40 MG/1; MG/1; MG/1
1 TABLET ORAL EVERY 6 HOURS PRN
Qty: 30 TAB | Refills: 0 | OUTPATIENT
Start: 2018-01-01 | End: 2019-01-01

## 2018-01-01 RX ORDER — POLYETHYLENE GLYCOL 3350 17 G/17G
1 POWDER, FOR SOLUTION ORAL
Status: DISCONTINUED | OUTPATIENT
Start: 2018-01-01 | End: 2018-01-01 | Stop reason: HOSPADM

## 2018-01-01 RX ORDER — HYDRALAZINE HYDROCHLORIDE 20 MG/ML
10-20 INJECTION INTRAMUSCULAR; INTRAVENOUS EVERY 6 HOURS PRN
Status: DISCONTINUED | OUTPATIENT
Start: 2018-01-01 | End: 2018-01-01

## 2018-01-01 RX ORDER — LIDOCAINE HCL/EPINEPHRINE/PF 2%-1:200K
VIAL (ML) INJECTION
Status: COMPLETED
Start: 2018-01-01 | End: 2018-01-01

## 2018-01-01 RX ORDER — MORPHINE SULFATE 4 MG/ML
2 INJECTION, SOLUTION INTRAMUSCULAR; INTRAVENOUS
Status: DISCONTINUED | OUTPATIENT
Start: 2018-01-01 | End: 2018-01-01 | Stop reason: HOSPADM

## 2018-01-01 RX ORDER — ZOLPIDEM TARTRATE 5 MG/1
5 TABLET ORAL NIGHTLY PRN
Status: DISCONTINUED | OUTPATIENT
Start: 2018-01-01 | End: 2018-01-01 | Stop reason: HOSPADM

## 2018-01-01 RX ORDER — BACITRACIN ZINC 500 [USP'U]/G
OINTMENT TOPICAL
Status: DISCONTINUED | OUTPATIENT
Start: 2018-01-01 | End: 2018-01-01 | Stop reason: HOSPADM

## 2018-01-01 RX ADMIN — SODIUM CHLORIDE TAB 1 GM 2 G: 1 TAB at 07:29

## 2018-01-01 RX ADMIN — DEXAMETHASONE SODIUM PHOSPHATE 4 MG: 4 INJECTION, SOLUTION INTRAMUSCULAR; INTRAVENOUS at 05:00

## 2018-01-01 RX ADMIN — OXYCODONE HYDROCHLORIDE 10 MG: 10 TABLET ORAL at 14:37

## 2018-01-01 RX ADMIN — ZOLPIDEM TARTRATE 5 MG: 5 TABLET, FILM COATED ORAL at 20:48

## 2018-01-01 RX ADMIN — ESCITALOPRAM OXALATE 20 MG: 10 TABLET ORAL at 09:00

## 2018-01-01 RX ADMIN — MORPHINE SULFATE 2 MG: 4 INJECTION INTRAVENOUS at 11:08

## 2018-01-01 RX ADMIN — POLYETHYLENE GLYCOL 3350 1 PACKET: 17 POWDER, FOR SOLUTION ORAL at 17:01

## 2018-01-01 RX ADMIN — HEPARIN 500 UNITS: 100 SYRINGE at 11:54

## 2018-01-01 RX ADMIN — LORAZEPAM 0.5 MG: 2 INJECTION INTRAMUSCULAR; INTRAVENOUS at 11:56

## 2018-01-01 RX ADMIN — SODIUM CHLORIDE TAB 1 GM 2 G: 1 TAB at 18:09

## 2018-01-01 RX ADMIN — STANDARDIZED SENNA CONCENTRATE AND DOCUSATE SODIUM 2 TABLET: 8.6; 5 TABLET, FILM COATED ORAL at 21:44

## 2018-01-01 RX ADMIN — MORPHINE SULFATE 2 MG: 4 INJECTION INTRAVENOUS at 16:54

## 2018-01-01 RX ADMIN — DEXAMETHASONE SODIUM PHOSPHATE 4 MG: 4 INJECTION, SOLUTION INTRAMUSCULAR; INTRAVENOUS at 00:25

## 2018-01-01 RX ADMIN — DEXAMETHASONE SODIUM PHOSPHATE 4 MG: 4 INJECTION, SOLUTION INTRAMUSCULAR; INTRAVENOUS at 12:00

## 2018-01-01 RX ADMIN — SODIUM CHLORIDE TAB 1 GM 2 G: 1 TAB at 16:56

## 2018-01-01 RX ADMIN — CEFTRIAXONE 2 G: 2 INJECTION, POWDER, FOR SOLUTION INTRAMUSCULAR; INTRAVENOUS at 09:05

## 2018-01-01 RX ADMIN — MIDAZOLAM HYDROCHLORIDE 2 MG: 1 INJECTION INTRAMUSCULAR; INTRAVENOUS at 13:48

## 2018-01-01 RX ADMIN — HEPARIN SODIUM 5000 UNITS: 5000 INJECTION, SOLUTION INTRAVENOUS; SUBCUTANEOUS at 21:18

## 2018-01-01 RX ADMIN — OXYCODONE HYDROCHLORIDE 10 MG: 10 TABLET ORAL at 02:52

## 2018-01-01 RX ADMIN — DEXAMETHASONE 4 MG: 4 TABLET ORAL at 15:36

## 2018-01-01 RX ADMIN — MORPHINE SULFATE 2 MG: 4 INJECTION INTRAVENOUS at 17:01

## 2018-01-01 RX ADMIN — CEFTRIAXONE 2 G: 2 INJECTION, POWDER, FOR SOLUTION INTRAMUSCULAR; INTRAVENOUS at 07:56

## 2018-01-01 RX ADMIN — SODIUM CHLORIDE TAB 1 GM 2 G: 1 TAB at 12:43

## 2018-01-01 RX ADMIN — LEVETIRACETAM 500 MG: 500 TABLET, FILM COATED ORAL at 20:12

## 2018-01-01 RX ADMIN — DEXAMETHASONE SODIUM PHOSPHATE 4 MG: 4 INJECTION, SOLUTION INTRAMUSCULAR; INTRAVENOUS at 21:18

## 2018-01-01 RX ADMIN — OXYCODONE HYDROCHLORIDE 5 MG: 5 TABLET ORAL at 18:27

## 2018-01-01 RX ADMIN — OMEPRAZOLE 20 MG: 20 CAPSULE, DELAYED RELEASE ORAL at 11:21

## 2018-01-01 RX ADMIN — SODIUM CHLORIDE TAB 1 GM 2 G: 1 TAB at 10:45

## 2018-01-01 RX ADMIN — DEXAMETHASONE 4 MG: 4 TABLET ORAL at 05:00

## 2018-01-01 RX ADMIN — DEXAMETHASONE SODIUM PHOSPHATE 4 MG: 4 INJECTION, SOLUTION INTRAMUSCULAR; INTRAVENOUS at 05:53

## 2018-01-01 RX ADMIN — DEXAMETHASONE SODIUM PHOSPHATE 4 MG: 4 INJECTION, SOLUTION INTRAMUSCULAR; INTRAVENOUS at 11:21

## 2018-01-01 RX ADMIN — OMEPRAZOLE 20 MG: 20 CAPSULE, DELAYED RELEASE ORAL at 08:29

## 2018-01-01 RX ADMIN — OXYCODONE HYDROCHLORIDE 5 MG: 5 TABLET ORAL at 04:45

## 2018-01-01 RX ADMIN — AMLODIPINE BESYLATE 5 MG: 5 TABLET ORAL at 08:59

## 2018-01-01 RX ADMIN — BUTALBITAL, ACETAMINOPHEN, AND CAFFEINE 1 TABLET: 50; 325; 40 TABLET ORAL at 12:22

## 2018-01-01 RX ADMIN — NICOTINE 14 MG: 14 PATCH, EXTENDED RELEASE TRANSDERMAL at 05:15

## 2018-01-01 RX ADMIN — OMEPRAZOLE 20 MG: 20 CAPSULE, DELAYED RELEASE ORAL at 09:07

## 2018-01-01 RX ADMIN — LISINOPRIL 20 MG: 20 TABLET ORAL at 11:21

## 2018-01-01 RX ADMIN — OXYCODONE HYDROCHLORIDE AND ACETAMINOPHEN 2 TABLET: 10; 325 TABLET ORAL at 10:13

## 2018-01-01 RX ADMIN — LEVETIRACETAM 500 MG: 500 TABLET, FILM COATED ORAL at 11:20

## 2018-01-01 RX ADMIN — BUTALBITAL, ACETAMINOPHEN, AND CAFFEINE 1 TABLET: 50; 325; 40 TABLET ORAL at 06:12

## 2018-01-01 RX ADMIN — LORAZEPAM 0.5 MG: 2 INJECTION INTRAMUSCULAR; INTRAVENOUS at 11:20

## 2018-01-01 RX ADMIN — OXYCODONE HYDROCHLORIDE 5 MG: 5 TABLET ORAL at 13:56

## 2018-01-01 RX ADMIN — LEVETIRACETAM 500 MG: 500 TABLET, FILM COATED ORAL at 20:15

## 2018-01-01 RX ADMIN — SODIUM CHLORIDE TAB 1 GM 1 G: 1 TAB at 12:07

## 2018-01-01 RX ADMIN — OXYCODONE HYDROCHLORIDE 10 MG: 10 TABLET ORAL at 08:59

## 2018-01-01 RX ADMIN — MORPHINE SULFATE 2 MG: 4 INJECTION INTRAVENOUS at 11:22

## 2018-01-01 RX ADMIN — ESCITALOPRAM OXALATE 20 MG: 10 TABLET ORAL at 09:30

## 2018-01-01 RX ADMIN — MORPHINE SULFATE 2 MG: 4 INJECTION INTRAVENOUS at 14:25

## 2018-01-01 RX ADMIN — LEVETIRACETAM 500 MG: 500 TABLET, FILM COATED ORAL at 09:19

## 2018-01-01 RX ADMIN — BUTALBITAL, ACETAMINOPHEN, AND CAFFEINE 1 TABLET: 50; 325; 40 TABLET ORAL at 23:48

## 2018-01-01 RX ADMIN — OMEPRAZOLE 20 MG: 20 CAPSULE, DELAYED RELEASE ORAL at 08:14

## 2018-01-01 RX ADMIN — HEPARIN SODIUM 5000 UNITS: 5000 INJECTION, SOLUTION INTRAVENOUS; SUBCUTANEOUS at 13:26

## 2018-01-01 RX ADMIN — IOHEXOL 100 ML: 350 INJECTION, SOLUTION INTRAVENOUS at 13:42

## 2018-01-01 RX ADMIN — DEXAMETHASONE SODIUM PHOSPHATE 4 MG: 4 INJECTION, SOLUTION INTRAMUSCULAR; INTRAVENOUS at 11:55

## 2018-01-01 RX ADMIN — ESCITALOPRAM OXALATE 20 MG: 10 TABLET ORAL at 09:19

## 2018-01-01 RX ADMIN — ACETAMINOPHEN 650 MG: 325 TABLET, FILM COATED ORAL at 03:16

## 2018-01-01 RX ADMIN — AMLODIPINE BESYLATE 5 MG: 5 TABLET ORAL at 08:14

## 2018-01-01 RX ADMIN — HEPARIN SODIUM 5000 UNITS: 5000 INJECTION, SOLUTION INTRAVENOUS; SUBCUTANEOUS at 09:19

## 2018-01-01 RX ADMIN — BUTALBITAL, ACETAMINOPHEN, AND CAFFEINE 1 TABLET: 50; 325; 40 TABLET ORAL at 01:13

## 2018-01-01 RX ADMIN — NICOTINE 14 MG: 14 PATCH, EXTENDED RELEASE TRANSDERMAL at 05:00

## 2018-01-01 RX ADMIN — MORPHINE SULFATE 2 MG: 4 INJECTION INTRAVENOUS at 05:14

## 2018-01-01 RX ADMIN — ESCITALOPRAM OXALATE 20 MG: 10 TABLET ORAL at 08:29

## 2018-01-01 RX ADMIN — NICOTINE 14 MG: 14 PATCH, EXTENDED RELEASE TRANSDERMAL at 14:54

## 2018-01-01 RX ADMIN — OXYCODONE HYDROCHLORIDE 10 MG: 10 TABLET ORAL at 23:12

## 2018-01-01 RX ADMIN — GADODIAMIDE 15 ML: 287 INJECTION INTRAVENOUS at 10:42

## 2018-01-01 RX ADMIN — DEXAMETHASONE SODIUM PHOSPHATE 4 MG: 4 INJECTION, SOLUTION INTRAMUSCULAR; INTRAVENOUS at 23:27

## 2018-01-01 RX ADMIN — LORAZEPAM 0.5 MG: 2 INJECTION INTRAMUSCULAR; INTRAVENOUS at 15:47

## 2018-01-01 RX ADMIN — LIDOCAINE HYDROCHLORIDE,EPINEPHRINE BITARTRATE: 20; .005 INJECTION, SOLUTION EPIDURAL; INFILTRATION; INTRACAUDAL; PERINEURAL at 14:00

## 2018-01-01 RX ADMIN — AMPICILLIN SODIUM AND SULBACTAM SODIUM 3 G: 2; 1 INJECTION, POWDER, FOR SOLUTION INTRAMUSCULAR; INTRAVENOUS at 13:17

## 2018-01-01 RX ADMIN — MORPHINE SULFATE 2 MG: 4 INJECTION INTRAVENOUS at 20:46

## 2018-01-01 RX ADMIN — GADODIAMIDE 14 ML: 287 INJECTION INTRAVENOUS at 12:08

## 2018-01-01 RX ADMIN — DEXAMETHASONE SODIUM PHOSPHATE 4 MG: 4 INJECTION, SOLUTION INTRAMUSCULAR; INTRAVENOUS at 01:07

## 2018-01-01 RX ADMIN — MORPHINE SULFATE 2 MG: 4 INJECTION INTRAVENOUS at 13:07

## 2018-01-01 RX ADMIN — HEPARIN SODIUM 5000 UNITS: 5000 INJECTION, SOLUTION INTRAVENOUS; SUBCUTANEOUS at 20:24

## 2018-01-01 RX ADMIN — HEPARIN SODIUM 5000 UNITS: 5000 INJECTION, SOLUTION INTRAVENOUS; SUBCUTANEOUS at 05:29

## 2018-01-01 RX ADMIN — ZOLPIDEM TARTRATE 5 MG: 5 TABLET, FILM COATED ORAL at 20:50

## 2018-01-01 RX ADMIN — MORPHINE SULFATE 2 MG: 4 INJECTION INTRAVENOUS at 10:25

## 2018-01-01 RX ADMIN — SODIUM CHLORIDE 500 MG: 9 INJECTION, SOLUTION INTRAVENOUS at 09:05

## 2018-01-01 RX ADMIN — DEXAMETHASONE SODIUM PHOSPHATE 4 MG: 4 INJECTION, SOLUTION INTRAMUSCULAR; INTRAVENOUS at 17:58

## 2018-01-01 RX ADMIN — DEXAMETHASONE SODIUM PHOSPHATE 4 MG: 4 INJECTION, SOLUTION INTRAMUSCULAR; INTRAVENOUS at 00:17

## 2018-01-01 RX ADMIN — ESCITALOPRAM OXALATE 20 MG: 10 TABLET ORAL at 11:21

## 2018-01-01 RX ADMIN — CEFTRIAXONE 2 G: 2 INJECTION, POWDER, FOR SOLUTION INTRAMUSCULAR; INTRAVENOUS at 15:16

## 2018-01-01 RX ADMIN — SODIUM CHLORIDE: 9 INJECTION, SOLUTION INTRAVENOUS at 23:18

## 2018-01-01 RX ADMIN — HEPARIN SODIUM 5000 UNITS: 5000 INJECTION, SOLUTION INTRAVENOUS; SUBCUTANEOUS at 13:55

## 2018-01-01 RX ADMIN — OMEPRAZOLE 20 MG: 20 CAPSULE, DELAYED RELEASE ORAL at 07:30

## 2018-01-01 RX ADMIN — IOHEXOL 75 ML: 350 INJECTION, SOLUTION INTRAVENOUS at 08:59

## 2018-01-01 RX ADMIN — LEVETIRACETAM 500 MG: 500 TABLET, FILM COATED ORAL at 08:29

## 2018-01-01 RX ADMIN — BUTALBITAL, ACETAMINOPHEN, AND CAFFEINE 1 TABLET: 50; 325; 40 TABLET ORAL at 03:43

## 2018-01-01 RX ADMIN — MORPHINE SULFATE 2 MG: 4 INJECTION INTRAVENOUS at 13:43

## 2018-01-01 RX ADMIN — DEXAMETHASONE SODIUM PHOSPHATE 4 MG: 4 INJECTION, SOLUTION INTRAMUSCULAR; INTRAVENOUS at 05:30

## 2018-01-01 RX ADMIN — HYDROMORPHONE HYDROCHLORIDE 0.5 MG: 1 INJECTION, SOLUTION INTRAMUSCULAR; INTRAVENOUS; SUBCUTANEOUS at 10:57

## 2018-01-01 RX ADMIN — MORPHINE SULFATE 2 MG: 4 INJECTION INTRAVENOUS at 23:38

## 2018-01-01 RX ADMIN — BUTALBITAL, ACETAMINOPHEN, AND CAFFEINE 1 TABLET: 50; 325; 40 TABLET ORAL at 04:15

## 2018-01-01 RX ADMIN — AMLODIPINE BESYLATE 5 MG: 5 TABLET ORAL at 09:08

## 2018-01-01 RX ADMIN — ZOLPIDEM TARTRATE 5 MG: 5 TABLET, FILM COATED ORAL at 21:44

## 2018-01-01 RX ADMIN — STANDARDIZED SENNA CONCENTRATE AND DOCUSATE SODIUM 2 TABLET: 8.6; 5 TABLET, FILM COATED ORAL at 10:29

## 2018-01-01 RX ADMIN — MORPHINE SULFATE 2 MG: 4 INJECTION INTRAVENOUS at 09:19

## 2018-01-01 RX ADMIN — LEVETIRACETAM 500 MG: 500 TABLET, FILM COATED ORAL at 08:15

## 2018-01-01 RX ADMIN — BUTALBITAL, ACETAMINOPHEN, AND CAFFEINE 1 TABLET: 50; 325; 40 TABLET ORAL at 12:57

## 2018-01-01 RX ADMIN — ACETAMINOPHEN 650 MG: 325 TABLET, FILM COATED ORAL at 10:54

## 2018-01-01 RX ADMIN — MORPHINE SULFATE 2 MG: 4 INJECTION INTRAVENOUS at 09:20

## 2018-01-01 RX ADMIN — STANDARDIZED SENNA CONCENTRATE AND DOCUSATE SODIUM 2 TABLET: 8.6; 5 TABLET, FILM COATED ORAL at 20:24

## 2018-01-01 RX ADMIN — GADODIAMIDE 15 ML: 287 INJECTION INTRAVENOUS at 13:05

## 2018-01-01 RX ADMIN — ESCITALOPRAM OXALATE 20 MG: 10 TABLET ORAL at 08:15

## 2018-01-01 RX ADMIN — DEXAMETHASONE 4 MG: 4 TABLET ORAL at 17:01

## 2018-01-01 RX ADMIN — LABETALOL HYDROCHLORIDE 10 MG: 5 INJECTION, SOLUTION INTRAVENOUS at 07:52

## 2018-01-01 RX ADMIN — SODIUM CHLORIDE TAB 1 GM 2 G: 1 TAB at 12:08

## 2018-01-01 RX ADMIN — MORPHINE SULFATE 2 MG: 4 INJECTION INTRAVENOUS at 04:57

## 2018-01-01 RX ADMIN — CEFTRIAXONE 2 G: 2 INJECTION, POWDER, FOR SOLUTION INTRAMUSCULAR; INTRAVENOUS at 10:31

## 2018-01-01 RX ADMIN — SODIUM CHLORIDE 500 MG: 9 INJECTION, SOLUTION INTRAVENOUS at 09:03

## 2018-01-01 RX ADMIN — LEVETIRACETAM 500 MG: 500 TABLET, FILM COATED ORAL at 20:50

## 2018-01-01 RX ADMIN — OMEPRAZOLE 20 MG: 20 CAPSULE, DELAYED RELEASE ORAL at 09:24

## 2018-01-01 RX ADMIN — MIDAZOLAM 2 MG: 1 INJECTION INTRAMUSCULAR; INTRAVENOUS at 13:50

## 2018-01-01 RX ADMIN — ZOLPIDEM TARTRATE 5 MG: 5 TABLET, FILM COATED ORAL at 21:18

## 2018-01-01 RX ADMIN — MAGNESIUM SULFATE IN WATER 2 G: 40 INJECTION, SOLUTION INTRAVENOUS at 10:04

## 2018-01-01 RX ADMIN — SODIUM CHLORIDE: 9 INJECTION, SOLUTION INTRAVENOUS at 06:40

## 2018-01-01 RX ADMIN — DEXAMETHASONE SODIUM PHOSPHATE 4 MG: 4 INJECTION, SOLUTION INTRAMUSCULAR; INTRAVENOUS at 04:49

## 2018-01-01 RX ADMIN — ACETAMINOPHEN 650 MG: 325 TABLET, FILM COATED ORAL at 14:19

## 2018-01-01 RX ADMIN — DEXAMETHASONE SODIUM PHOSPHATE 4 MG: 4 INJECTION, SOLUTION INTRAMUSCULAR; INTRAVENOUS at 18:10

## 2018-01-01 RX ADMIN — SODIUM CHLORIDE TAB 1 GM 2 G: 1 TAB at 09:04

## 2018-01-01 RX ADMIN — BUTALBITAL, ACETAMINOPHEN, AND CAFFEINE 1 TABLET: 50; 325; 40 TABLET ORAL at 16:55

## 2018-01-01 RX ADMIN — OXYCODONE HYDROCHLORIDE 10 MG: 10 TABLET ORAL at 07:30

## 2018-01-01 RX ADMIN — HEPARIN SODIUM 5000 UNITS: 5000 INJECTION, SOLUTION INTRAVENOUS; SUBCUTANEOUS at 05:50

## 2018-01-01 RX ADMIN — MAGNESIUM SULFATE IN WATER 2 G: 40 INJECTION, SOLUTION INTRAVENOUS at 18:40

## 2018-01-01 RX ADMIN — GADODIAMIDE 15 ML: 287 INJECTION INTRAVENOUS at 17:11

## 2018-01-01 RX ADMIN — ESCITALOPRAM OXALATE 20 MG: 10 TABLET ORAL at 09:24

## 2018-01-01 RX ADMIN — DEXAMETHASONE SODIUM PHOSPHATE 4 MG: 4 INJECTION, SOLUTION INTRAMUSCULAR; INTRAVENOUS at 23:47

## 2018-01-01 RX ADMIN — DEXAMETHASONE SODIUM PHOSPHATE 4 MG: 4 INJECTION, SOLUTION INTRAMUSCULAR; INTRAVENOUS at 04:45

## 2018-01-01 RX ADMIN — SODIUM CHLORIDE 500 MG: 9 INJECTION, SOLUTION INTRAVENOUS at 23:59

## 2018-01-01 RX ADMIN — SODIUM CHLORIDE: 9 INJECTION, SOLUTION INTRAVENOUS at 20:51

## 2018-01-01 RX ADMIN — OXYCODONE HYDROCHLORIDE 10 MG: 10 TABLET ORAL at 09:14

## 2018-01-01 RX ADMIN — DEXAMETHASONE 4 MG: 4 TABLET ORAL at 17:38

## 2018-01-01 RX ADMIN — MORPHINE SULFATE 2 MG: 4 INJECTION INTRAVENOUS at 04:04

## 2018-01-01 RX ADMIN — DEXAMETHASONE SODIUM PHOSPHATE 4 MG: 4 INJECTION, SOLUTION INTRAMUSCULAR; INTRAVENOUS at 05:50

## 2018-01-01 RX ADMIN — DEXAMETHASONE SODIUM PHOSPHATE 4 MG: 4 INJECTION, SOLUTION INTRAMUSCULAR; INTRAVENOUS at 23:28

## 2018-01-01 RX ADMIN — AMLODIPINE BESYLATE 5 MG: 5 TABLET ORAL at 11:55

## 2018-01-01 RX ADMIN — LEVETIRACETAM 500 MG: 500 TABLET, FILM COATED ORAL at 07:30

## 2018-01-01 RX ADMIN — HEPARIN SODIUM 5000 UNITS: 5000 INJECTION, SOLUTION INTRAVENOUS; SUBCUTANEOUS at 20:15

## 2018-01-01 RX ADMIN — LEVETIRACETAM 500 MG: 500 TABLET, FILM COATED ORAL at 21:44

## 2018-01-01 RX ADMIN — CEFTRIAXONE 2 G: 2 INJECTION, POWDER, FOR SOLUTION INTRAMUSCULAR; INTRAVENOUS at 09:20

## 2018-01-01 RX ADMIN — OMEPRAZOLE 20 MG: 20 CAPSULE, DELAYED RELEASE ORAL at 08:58

## 2018-01-01 RX ADMIN — MORPHINE SULFATE 2 MG: 4 INJECTION INTRAVENOUS at 08:05

## 2018-01-01 RX ADMIN — DEXAMETHASONE SODIUM PHOSPHATE 4 MG: 4 INJECTION, SOLUTION INTRAMUSCULAR; INTRAVENOUS at 12:14

## 2018-01-01 RX ADMIN — ZOLPIDEM TARTRATE 5 MG: 5 TABLET, FILM COATED ORAL at 23:18

## 2018-01-01 RX ADMIN — SODIUM CHLORIDE: 9 INJECTION, SOLUTION INTRAVENOUS at 10:29

## 2018-01-01 RX ADMIN — DEXAMETHASONE SODIUM PHOSPHATE 4 MG: 4 INJECTION, SOLUTION INTRAMUSCULAR; INTRAVENOUS at 13:56

## 2018-01-01 RX ADMIN — OMEPRAZOLE 20 MG: 20 CAPSULE, DELAYED RELEASE ORAL at 09:19

## 2018-01-01 RX ADMIN — MORPHINE SULFATE 2 MG: 4 INJECTION INTRAVENOUS at 02:51

## 2018-01-01 RX ADMIN — DEXAMETHASONE SODIUM PHOSPHATE 4 MG: 4 INJECTION, SOLUTION INTRAMUSCULAR; INTRAVENOUS at 18:22

## 2018-01-01 RX ADMIN — DEXAMETHASONE SODIUM PHOSPHATE 4 MG: 4 INJECTION, SOLUTION INTRAMUSCULAR; INTRAVENOUS at 11:56

## 2018-01-01 RX ADMIN — DEXAMETHASONE 4 MG: 4 TABLET ORAL at 13:17

## 2018-01-01 RX ADMIN — NICOTINE 14 MG: 14 PATCH, EXTENDED RELEASE TRANSDERMAL at 05:53

## 2018-01-01 RX ADMIN — AMLODIPINE BESYLATE 5 MG: 5 TABLET ORAL at 07:30

## 2018-01-01 RX ADMIN — LISINOPRIL 20 MG: 20 TABLET ORAL at 09:19

## 2018-01-01 RX ADMIN — HEPARIN SODIUM 5000 UNITS: 5000 INJECTION, SOLUTION INTRAVENOUS; SUBCUTANEOUS at 14:39

## 2018-01-01 RX ADMIN — MORPHINE SULFATE 2 MG: 4 INJECTION INTRAVENOUS at 17:58

## 2018-01-01 RX ADMIN — LEVETIRACETAM 500 MG: 500 TABLET, FILM COATED ORAL at 21:18

## 2018-01-01 RX ADMIN — ZOLPIDEM TARTRATE 5 MG: 5 TABLET, FILM COATED ORAL at 21:46

## 2018-01-01 RX ADMIN — CEFTRIAXONE 2 G: 2 INJECTION, POWDER, FOR SOLUTION INTRAMUSCULAR; INTRAVENOUS at 09:14

## 2018-01-01 RX ADMIN — LEVETIRACETAM 500 MG: 500 TABLET, FILM COATED ORAL at 09:23

## 2018-01-01 RX ADMIN — DEXAMETHASONE SODIUM PHOSPHATE 4 MG: 4 INJECTION, SOLUTION INTRAMUSCULAR; INTRAVENOUS at 12:02

## 2018-01-01 RX ADMIN — HEPARIN SODIUM 5000 UNITS: 5000 INJECTION, SOLUTION INTRAVENOUS; SUBCUTANEOUS at 21:43

## 2018-01-01 RX ADMIN — SODIUM CHLORIDE 500 MG: 9 INJECTION, SOLUTION INTRAVENOUS at 20:47

## 2018-01-01 RX ADMIN — OXYCODONE HYDROCHLORIDE 10 MG: 10 TABLET ORAL at 18:25

## 2018-01-01 RX ADMIN — STANDARDIZED SENNA CONCENTRATE AND DOCUSATE SODIUM 2 TABLET: 8.6; 5 TABLET, FILM COATED ORAL at 20:50

## 2018-01-01 RX ADMIN — MORPHINE SULFATE 2 MG: 4 INJECTION INTRAVENOUS at 18:22

## 2018-01-01 RX ADMIN — DEXAMETHASONE 4 MG: 4 TABLET ORAL at 00:17

## 2018-01-01 RX ADMIN — MORPHINE SULFATE 2 MG: 4 INJECTION INTRAVENOUS at 20:51

## 2018-01-01 RX ADMIN — BUTALBITAL, ACETAMINOPHEN, AND CAFFEINE 1 TABLET: 50; 325; 40 TABLET ORAL at 16:45

## 2018-01-01 RX ADMIN — MORPHINE SULFATE 2 MG: 4 INJECTION INTRAVENOUS at 11:20

## 2018-01-01 RX ADMIN — SODIUM CHLORIDE 500 MG: 9 INJECTION, SOLUTION INTRAVENOUS at 09:08

## 2018-01-01 RX ADMIN — SODIUM CHLORIDE: 9 INJECTION, SOLUTION INTRAVENOUS at 14:54

## 2018-01-01 RX ADMIN — AMLODIPINE BESYLATE 5 MG: 5 TABLET ORAL at 09:23

## 2018-01-01 RX ADMIN — BUTALBITAL, ACETAMINOPHEN, AND CAFFEINE 1 TABLET: 50; 325; 40 TABLET ORAL at 07:19

## 2018-01-01 RX ADMIN — ESCITALOPRAM OXALATE 20 MG: 10 TABLET ORAL at 07:29

## 2018-01-01 RX ADMIN — LEVETIRACETAM 500 MG: 500 TABLET, FILM COATED ORAL at 20:24

## 2018-01-01 RX ADMIN — DEXAMETHASONE SODIUM PHOSPHATE 4 MG: 4 INJECTION, SOLUTION INTRAMUSCULAR; INTRAVENOUS at 18:12

## 2018-01-01 RX ADMIN — PNEUMOCOCCAL 13-VALENT CONJUGATE VACCINE 0.5 ML: 2.2; 2.2; 2.2; 2.2; 2.2; 4.4; 2.2; 2.2; 2.2; 2.2; 2.2; 2.2; 2.2 INJECTION, SUSPENSION INTRAMUSCULAR at 22:24

## 2018-01-01 RX ADMIN — SODIUM CHLORIDE 500 MG: 9 INJECTION, SOLUTION INTRAVENOUS at 22:20

## 2018-01-01 RX ADMIN — SODIUM CHLORIDE 1000 MG: 9 INJECTION, SOLUTION INTRAVENOUS at 11:39

## 2018-01-01 RX ADMIN — DEXAMETHASONE SODIUM PHOSPHATE 4 MG: 4 INJECTION, SOLUTION INTRAMUSCULAR; INTRAVENOUS at 05:14

## 2018-01-01 RX ADMIN — ZOLPIDEM TARTRATE 5 MG: 5 TABLET, FILM COATED ORAL at 21:36

## 2018-01-01 RX ADMIN — POLYETHYLENE GLYCOL 3350 1 PACKET: 17 POWDER, FOR SOLUTION ORAL at 15:46

## 2018-01-01 RX ADMIN — TRAZODONE HYDROCHLORIDE 50 MG: 100 TABLET ORAL at 22:24

## 2018-01-01 RX ADMIN — DEXAMETHASONE 4 MG: 4 TABLET ORAL at 05:15

## 2018-01-01 RX ADMIN — OXYCODONE HYDROCHLORIDE 10 MG: 10 TABLET ORAL at 01:36

## 2018-01-01 RX ADMIN — SODIUM CHLORIDE 1000 MG: 9 INJECTION, SOLUTION INTRAVENOUS at 10:36

## 2018-01-01 RX ADMIN — MAGNESIUM HYDROXIDE 30 ML: 400 SUSPENSION ORAL at 20:24

## 2018-01-01 RX ADMIN — ONDANSETRON 4 MG: 2 INJECTION, SOLUTION INTRAMUSCULAR; INTRAVENOUS at 14:53

## 2018-01-01 RX ADMIN — FENTANYL CITRATE 25 MCG: 50 INJECTION, SOLUTION INTRAMUSCULAR; INTRAVENOUS at 13:48

## 2018-01-01 RX ADMIN — LISINOPRIL 20 MG: 20 TABLET ORAL at 08:28

## 2018-01-01 RX ADMIN — DEXAMETHASONE SODIUM PHOSPHATE 4 MG: 4 INJECTION, SOLUTION INTRAMUSCULAR; INTRAVENOUS at 12:13

## 2018-01-01 RX ADMIN — OXYCODONE HYDROCHLORIDE 10 MG: 10 TABLET ORAL at 20:12

## 2018-01-01 RX ADMIN — HEPARIN SODIUM 5000 UNITS: 5000 INJECTION, SOLUTION INTRAVENOUS; SUBCUTANEOUS at 12:58

## 2018-01-01 RX ADMIN — HEPARIN 500 UNITS: 100 SYRINGE at 14:11

## 2018-01-01 RX ADMIN — OXYCODONE HYDROCHLORIDE 5 MG: 5 SOLUTION ORAL at 22:00

## 2018-01-01 RX ADMIN — DEXAMETHASONE SODIUM PHOSPHATE 4 MG: 4 INJECTION, SOLUTION INTRAMUSCULAR; INTRAVENOUS at 23:18

## 2018-01-01 RX ADMIN — SODIUM CHLORIDE 500 MG: 9 INJECTION, SOLUTION INTRAVENOUS at 09:29

## 2018-01-01 RX ADMIN — GADOBUTROL 7.5 ML: 604.72 INJECTION INTRAVENOUS at 09:31

## 2018-01-01 RX ADMIN — DEXAMETHASONE SODIUM PHOSPHATE 4 MG: 4 INJECTION, SOLUTION INTRAMUSCULAR; INTRAVENOUS at 12:07

## 2018-01-01 RX ADMIN — BUTALBITAL, ACETAMINOPHEN, AND CAFFEINE 1 TABLET: 50; 325; 40 TABLET ORAL at 11:19

## 2018-01-01 RX ADMIN — MORPHINE SULFATE 2 MG: 4 INJECTION INTRAVENOUS at 04:42

## 2018-01-01 RX ADMIN — DEXAMETHASONE SODIUM PHOSPHATE 4 MG: 4 INJECTION, SOLUTION INTRAMUSCULAR; INTRAVENOUS at 18:27

## 2018-01-01 RX ADMIN — CEFTRIAXONE 2 G: 2 INJECTION, POWDER, FOR SOLUTION INTRAMUSCULAR; INTRAVENOUS at 09:31

## 2018-01-01 RX ADMIN — SODIUM CHLORIDE TAB 1 GM 2 G: 1 TAB at 08:14

## 2018-01-01 RX ADMIN — LABETALOL HYDROCHLORIDE 10 MG: 5 INJECTION, SOLUTION INTRAVENOUS at 22:15

## 2018-01-01 RX ADMIN — FENTANYL CITRATE 50 MCG: 50 INJECTION, SOLUTION INTRAMUSCULAR; INTRAVENOUS at 22:00

## 2018-01-01 RX ADMIN — MIDAZOLAM HYDROCHLORIDE 2 MG: 1 INJECTION INTRAMUSCULAR; INTRAVENOUS at 13:50

## 2018-01-01 RX ADMIN — NICOTINE 14 MG: 14 PATCH, EXTENDED RELEASE TRANSDERMAL at 05:14

## 2018-01-01 RX ADMIN — LEVETIRACETAM 500 MG: 500 TABLET, FILM COATED ORAL at 21:36

## 2018-01-01 RX ADMIN — DEXAMETHASONE SODIUM PHOSPHATE 4 MG: 4 INJECTION, SOLUTION INTRAMUSCULAR; INTRAVENOUS at 13:31

## 2018-01-01 RX ADMIN — NICOTINE 14 MG: 14 PATCH, EXTENDED RELEASE TRANSDERMAL at 04:45

## 2018-01-01 RX ADMIN — OXYCODONE HYDROCHLORIDE 10 MG: 10 TABLET ORAL at 13:20

## 2018-01-01 RX ADMIN — FENTANYL CITRATE 50 MCG: 50 INJECTION, SOLUTION INTRAMUSCULAR; INTRAVENOUS at 14:54

## 2018-01-01 RX ADMIN — DEXAMETHASONE 4 MG: 4 TABLET ORAL at 00:13

## 2018-01-01 RX ADMIN — SODIUM CHLORIDE TAB 1 GM 1 G: 1 TAB at 18:27

## 2018-01-01 RX ADMIN — BUTALBITAL, ACETAMINOPHEN, AND CAFFEINE 1 TABLET: 50; 325; 40 TABLET ORAL at 16:39

## 2018-01-01 SDOH — ECONOMIC STABILITY: HOUSING INSECURITY: UNSAFE COOKING RANGE AREA: 0

## 2018-01-01 SDOH — ECONOMIC STABILITY: GENERAL

## 2018-01-01 SDOH — ECONOMIC STABILITY: HOUSING INSECURITY: UNSAFE APPLIANCES: 0

## 2018-01-01 SDOH — HEALTH STABILITY: MENTAL HEALTH: STRESS FACTORS COMMENTS: RECENT DEATH OF HUSBAND.

## 2018-01-01 ASSESSMENT — ENCOUNTER SYMPTOMS
COUGH: 1
SORE THROAT: 0
POLYDIPSIA: 0
DESCRIPTION OF MEMORY LOSS: IMMEDIATE
BLURRED VISION: 0
NAUSEA: 0
DIZZINESS: 0
SHORTNESS OF BREATH: 0
FORGETFULNESS: 1
ABDOMINAL PAIN: 0
SLEEP QUALITY: FAIR
DYSPNEA ACTIVITY LEVEL: AFTER AMBULATING LESS THAN 10 FT
STOOL FREQUENCY: DAILY
BLOOD IN STOOL: 0
DIZZINESS: 1
LOWER EXTREMITY EDEMA: 1
LAST BOWEL MOVEMENT: 64947
FALLS: 1
SHORTNESS OF BREATH: 1
LAST BOWEL MOVEMENT: 64940
FALLS: 0
CONSTIPATION: 0
BOWEL PATTERN NORMAL: 1
TREMORS: 0
TREMORS: 0
SHORTNESS OF BREATH: 1
SLEEP QUALITY: FAIR
PND: 0
ABDOMINAL PAIN: 0
PALPITATIONS: 0
DYSPNEA ACTIVITY LEVEL: AFTER AMBULATING MORE THAN 20 FT
TREMORS: 0
SENSORY CHANGE: 0
HEMOPTYSIS: 0
SHORTNESS OF BREATH: 0
INSOMNIA: 0
EYE REDNESS: 0
LAST BOWEL MOVEMENT: 64944
HEMOPTYSIS: 0
WEAKNESS: 0
LAST BOWEL MOVEMENT: 64961
PHOTOPHOBIA: 0
DIZZINESS: 1
STOOL FREQUENCY: DAILY
FOCAL WEAKNESS: 0
COUGH: 0
STRIDOR: 0
BLOOD IN STOOL: 0
HEMOPTYSIS: 0
MYALGIAS: 0
DYSPNEA ON EXERTION: 1
FATIGUES EASILY: 1
FATIGUE: 1
PHOTOPHOBIA: 0
SPUTUM PRODUCTION: 0
PHOTOPHOBIA: 0
LAST BOWEL MOVEMENT: 64928
FORGETFULNESS: 1
PND: 0
SORE THROAT: 0
CHILLS: 0
DOUBLE VISION: 1
SEIZURES: 1
LOWER EXTREMITY EDEMA: 1
HEADACHES: 0
DESCRIPTION OF MEMORY LOSS: SHORT TERM
CONSTIPATION: 0
DEPRESSED MOOD: 1
VOMITING: 0
NECK PAIN: 0
DESCRIPTION OF MEMORY LOSS: SHORT TERM
HEADACHES: 1
LOWER EXTREMITY EDEMA: 1
CONSTIPATION: 0
SEIZURES: 0
SHORTNESS OF BREATH: 1
EYE PAIN: 0
SHORTNESS OF BREATH: 0
COUGH: 1
FATIGUES EASILY: 1
BLOOD IN STOOL: 0
STOOL FREQUENCY: DAILY
SPEECH CHANGE: 0
STOOL FREQUENCY: LESS THAN DAILY
STOOL FREQUENCY: DAILY
FOCAL WEAKNESS: 0
CHILLS: 0
SHORTNESS OF BREATH: 0
DESCRIPTION OF MEMORY LOSS: SHORT TERM
BOWEL PATTERN NORMAL: 1
ABDOMINAL PAIN: 0
HEADACHES: 1
SENSORY CHANGE: 0
TINGLING: 0
SHORTNESS OF BREATH: 0
VOMITING: 0
PALPITATIONS: 0
SPUTUM PRODUCTION: 0
FEVER: 0
FORGETFULNESS: 1
NAUSEA: 0
SLEEP QUALITY: FAIR
FORGETFULNESS: 1
SORE THROAT: 0
WEAKNESS: 0
SENSORY CHANGE: 0
COUGH: 0
EYE PAIN: 0
VOMITING: 0
BRUISES/BLEEDS EASILY: 0
BACK PAIN: 1
STOOL FREQUENCY: DAILY
DIZZINESS: 0
BLURRED VISION: 0
SPUTUM PRODUCTION: 0
ABDOMINAL PAIN: 0
VOMITING: 0
NAUSEA: 0
FORGETFULNESS: 1
LAST BOWEL MOVEMENT: 64987
SHORTNESS OF BREATH: 0
DYSPNEA ACTIVITY LEVEL: AFTER AMBULATING LESS THAN 10 FT
NAUSEA: 1
SLEEP QUALITY: ADEQUATE
FORGETFULNESS: 1
DOUBLE VISION: 0
FATIGUES EASILY: 1
COUGH: 1
FATIGUES EASILY: 1
VOMITING: 0
FEVER: 0
ORTHOPNEA: 0
LAST BOWEL MOVEMENT: 64953
FOCAL WEAKNESS: 1
FEVER: 0
STOOL FREQUENCY: DAILY
PHOTOPHOBIA: 0
SORE THROAT: 0
VOMITING: 0
LAST BOWEL MOVEMENT: 64973
BLURRED VISION: 0
DIARRHEA: 0
CONSTIPATION: 0
NAUSEA: 0
STOOL FREQUENCY: LESS THAN DAILY
DIARRHEA: 0
MYALGIAS: 0
LOWER EXTREMITY EDEMA: 1
SEIZURES: 0
DIARRHEA: 0
SPUTUM PRODUCTION: 0
DESCRIPTION OF MEMORY LOSS: SHORT TERM
FATIGUE: 1
DYSPNEA ON EXERTION: 1
FORGETFULNESS: 1
SLEEP QUALITY: FAIR
SHORTNESS OF BREATH: 1
DYSPNEA ON EXERTION: 1
BOWEL PATTERN NORMAL: 1
SEIZURES: 0
NAUSEA: 0
DYSPNEA ON EXERTION: 1
BOWEL PATTERN NORMAL: 1
NAUSEA: 1
PHOTOPHOBIA: 0
HEMOPTYSIS: 0
FATIGUES EASILY: 1
LOWER EXTREMITY EDEMA: 1
BLURRED VISION: 0
FATIGUES EASILY: 1
COUGH: 0
SLEEP QUALITY: FAIR
FORGETFULNESS: 1
STOOL FREQUENCY: LESS THAN DAILY
EYE PAIN: 0
CHILLS: 0
WEAKNESS: 0
CONSTIPATION: 0
PND: 0
MYALGIAS: 0
DOUBLE VISION: 0
CONSTIPATION: 0
NAUSEA: 1
ORTHOPNEA: 0
BOWEL PATTERN NORMAL: 1
DIARRHEA: 0
BLOOD IN STOOL: 0
LOSS OF CONSCIOUSNESS: 0
FATIGUES EASILY: 1
COUGH: 0
LAST BOWEL MOVEMENT: 65002
BLURRED VISION: 0
LAST BOWEL MOVEMENT: 65008
BLURRED VISION: 1
FOCAL WEAKNESS: 0
FATIGUE: 1
SHORTNESS OF BREATH: 1
MYALGIAS: 0
SHORTNESS OF BREATH: 0
DOUBLE VISION: 0
DESCRIPTION OF MEMORY LOSS: SHORT TERM
FATIGUE: 1
BOWEL PATTERN NORMAL: 1
ABDOMINAL PAIN: 0
FATIGUE: 1
CONSTIPATION: 1
DESCRIPTION OF MEMORY LOSS: SHORT TERM
BACK PAIN: 0
SPEECH CHANGE: 0
DESCRIPTION OF MEMORY LOSS: SHORT TERM
FATIGUES EASILY: 1
BLOOD IN STOOL: 0
SINUS PAIN: 0
BOWEL PATTERN NORMAL: 1
FATIGUES EASILY: 1
WEAKNESS: 0
FATIGUES EASILY: 1
SPEECH CHANGE: 1
HEMOPTYSIS: 0
SINUS PAIN: 0
FOCAL WEAKNESS: 1
MYALGIAS: 1
LAST BOWEL MOVEMENT: 64965
SORE THROAT: 0
ORTHOPNEA: 0
HEADACHES: 0
SPUTUM PRODUCTION: 0
INSOMNIA: 0
FOCAL WEAKNESS: 0
CONSTIPATION: 1
BOWEL PATTERN NORMAL: 1
STOOL FREQUENCY: DAILY
CONSTIPATION: 0
TINGLING: 0
FATIGUES EASILY: 1
FORGETFULNESS: 1
DESCRIPTION OF MEMORY LOSS: SHORT TERM
LAST BOWEL MOVEMENT: 64997
MEMORY LOSS: 0
PHOTOPHOBIA: 0
CHILLS: 0
FORGETFULNESS: 1
TREMORS: 0
DOUBLE VISION: 0
MEMORY LOSS: 0
COUGH: 0
DYSPNEA ACTIVITY LEVEL: AFTER AMBULATING LESS THAN 10 FT
DIARRHEA: 0
NERVOUS/ANXIOUS: 0
FORGETFULNESS: 1
FORGETFULNESS: 1
PHOTOPHOBIA: 0
NERVOUS/ANXIOUS: 0
BOWEL PATTERN NORMAL: 1
HEADACHES: 1
PHOTOPHOBIA: 0
FORGETFULNESS: 1
SEIZURES: 0
DIARRHEA: 0
SLEEP QUALITY: FAIR
PALPITATIONS: 0
NAUSEA: 0
SLEEP QUALITY: FAIR
EYE DISCHARGE: 0
FATIGUE: 1
ORTHOPNEA: 0
SLEEP QUALITY: ADEQUATE
DESCRIPTION OF MEMORY LOSS: SHORT TERM
FATIGUE: 1
SLEEP QUALITY: FAIR
SLEEP QUALITY: FAIR
SPUTUM PRODUCTION: 0
HEADACHES: 1
DIARRHEA: 0
STOOL FREQUENCY: DAILY
LAST BOWEL MOVEMENT: 64986
TREMORS: 1
SENSORY CHANGE: 0
ABDOMINAL PAIN: 0
SHORTNESS OF BREATH: 0
SHORTNESS OF BREATH: 1
SPEECH CHANGE: 0
STOOL FREQUENCY: LESS THAN DAILY
FATIGUES EASILY: 1
EYE REDNESS: 0
HEADACHES: 1
NAUSEA: 1
SLEEP QUALITY: FAIR
MEMORY LOSS: 0
SENSORY CHANGE: 1
CHILLS: 0
LAST BOWEL MOVEMENT: 64951
ABDOMINAL PAIN: 0
DYSPNEA ON EXERTION: 1
FEVER: 0
SORE THROAT: 0
SENSORY CHANGE: 0
CHILLS: 0
PALPITATIONS: 0
SLEEP QUALITY: FAIR
TREMORS: 0
TINGLING: 0
CHILLS: 0
TINGLING: 0
BLURRED VISION: 0
PND: 0
MYALGIAS: 0
SPUTUM PRODUCTION: 1
BLURRED VISION: 0
FATIGUES EASILY: 1
BACK PAIN: 0
DEPRESSED MOOD: 1
BOWEL PATTERN NORMAL: 1
FORGETFULNESS: 1
CLAUDICATION: 0
SLEEP QUALITY: FAIR
FATIGUE: 1
LAST BOWEL MOVEMENT: 64932
CHILLS: 0
MYALGIAS: 0
STOOL FREQUENCY: LESS THAN DAILY
FEVER: 0
SHORTNESS OF BREATH: 0
ORTHOPNEA: 0
NAUSEA: 0
CONSTIPATION: 1
TINGLING: 0
FATIGUE: 1
PALPITATIONS: 0
NECK PAIN: 0
DIZZINESS: 0
DOUBLE VISION: 0
BOWEL PATTERN NORMAL: 1
PALPITATIONS: 0
FATIGUE: 1
BLURRED VISION: 0
SHORTNESS OF BREATH: 0
STOOL FREQUENCY: DAILY
NAUSEA: 0
FOCAL WEAKNESS: 0
FOCAL WEAKNESS: 0
DYSPNEA ON EXERTION: 1
ORTHOPNEA: 0
SORE THROAT: 0
SPEECH CHANGE: 0
DEPRESSION: 1
ORTHOPNEA: 0
NAUSEA: 1
SHORTNESS OF BREATH: 1
DIZZINESS: 0
FLANK PAIN: 0
LAST BOWEL MOVEMENT: 64979
HEADACHES: 1
FALLS: 0
WEAKNESS: 0
DEPRESSED MOOD: 1
CHILLS: 0
HEADACHES: 1
EYE PAIN: 0
MYALGIAS: 0
DIZZINESS: 0
COUGH: 1
TINGLING: 0
CHILLS: 0
BLURRED VISION: 1
FORGETFULNESS: 1
LAST BOWEL MOVEMENT: 64959
FEVER: 0
FATIGUES EASILY: 1
DIARRHEA: 0
STOOL FREQUENCY: DAILY
EYE REDNESS: 0
DOUBLE VISION: 0
SEIZURES: 1
HEADACHES: 1
FATIGUE: 1
SEIZURES: 0
MUSCULOSKELETAL NEGATIVE: 1
VOMITING: 0
ORTHOPNEA: 0
HEADACHES: 1
LOSS OF CONSCIOUSNESS: 0
DEPRESSION: 1
SEIZURES: 1
FATIGUES EASILY: 1
DYSPNEA ON EXERTION: 1
NAUSEA: 1
BACK PAIN: 0
DOUBLE VISION: 0
NAUSEA: 0
WEAKNESS: 0
LAST BOWEL MOVEMENT: 64987
LOSS OF CONSCIOUSNESS: 0
HEADACHES: 1
SEIZURES: 0
PND: 0
NERVOUS/ANXIOUS: 1
BOWEL PATTERN NORMAL: 1
FORGETFULNESS: 1
POLYDIPSIA: 0
VOMITING: 0
SLEEP QUALITY: ADEQUATE
FATIGUES EASILY: 1
LAST BOWEL MOVEMENT: 64994
PALPITATIONS: 0
DESCRIPTION OF MEMORY LOSS: SHORT TERM
DOUBLE VISION: 0
FOCAL WEAKNESS: 0
HEADACHES: 1
BOWEL PATTERN NORMAL: 1
BACK PAIN: 0
DESCRIPTION OF MEMORY LOSS: SHORT TERM
INSOMNIA: 0
ABDOMINAL PAIN: 0
NAUSEA: 1
BOWEL PATTERN NORMAL: 1
PALPITATIONS: 0
FOCAL WEAKNESS: 0
DESCRIPTION OF MEMORY LOSS: SHORT TERM
EYE PAIN: 0
SLEEP QUALITY: FAIR
DIZZINESS: 0
VOMITING: 0
PND: 0
CHILLS: 0
FATIGUES EASILY: 1
VOMITING: 0
ABDOMINAL PAIN: 0
SORE THROAT: 0
FEVER: 0
CONSTIPATION: 0
COUGH: 0
FEVER: 0
BLOOD IN STOOL: 0
TINGLING: 0
WEIGHT LOSS: 0
NAUSEA: 0
ABDOMINAL PAIN: 0
MYALGIAS: 1
COUGH: 0
TINGLING: 1
STOOL FREQUENCY: LESS THAN DAILY
STOOL FREQUENCY: LESS THAN DAILY
BLOOD IN STOOL: 0
SEIZURES: 0
CONSTIPATION: 1
PALPITATIONS: 0
LOWER EXTREMITY EDEMA: 1
DIAPHORESIS: 0
DESCRIPTION OF MEMORY LOSS: SHORT TERM
HEADACHES: 0
NAUSEA: 1
BACK PAIN: 0
FATIGUE: 1
VOMITING: 0
STOOL FREQUENCY: DAILY
DIZZINESS: 0
WEAKNESS: 0
FATIGUES EASILY: 1
DOUBLE VISION: 0
SLEEP QUALITY: ADEQUATE
SPEECH CHANGE: 0
STOOL FREQUENCY: DAILY
WHEEZING: 0
NAUSEA: 1
NECK PAIN: 1
MYALGIAS: 1
COUGH: 0
LOWER EXTREMITY EDEMA: 1
SINUS PAIN: 0
STOOL FREQUENCY: DAILY
NECK PAIN: 0
FATIGUE: 1
FORGETFULNESS: 1
DIZZINESS: 1
TREMORS: 0
TREMORS: 0
SEIZURES: 0
NAUSEA: 0
SENSORY CHANGE: 0
SPEECH CHANGE: 0
DESCRIPTION OF MEMORY LOSS: SHORT TERM
FEVER: 0
SENSORY CHANGE: 0
SPEECH CHANGE: 0
FEVER: 0
FOCAL WEAKNESS: 0
FATIGUE: 1
BRUISES/BLEEDS EASILY: 0
FEVER: 0
SPEECH CHANGE: 0
SLEEP QUALITY: FAIR
FOCAL WEAKNESS: 0
MYALGIAS: 0
SLEEP QUALITY: FAIR
BOWEL PATTERN NORMAL: 1
FATIGUE: 1
PSYCHIATRIC NEGATIVE: 1
FATIGUE: 1
LOSS OF CONSCIOUSNESS: 0
SPEECH CHANGE: 0
SEIZURES: 0
DEPRESSION: 1
COUGH: 0
PALPITATIONS: 0
HALLUCINATIONS: 0
SORE THROAT: 0
PHOTOPHOBIA: 0
SINUS PAIN: 0
SLEEP QUALITY: FAIR

## 2018-01-01 ASSESSMENT — PAIN SCALES - GENERAL
PAINLEVEL_OUTOF10: 3
PAINLEVEL_OUTOF10: 6
PAINLEVEL_OUTOF10: 5
PAINLEVEL_OUTOF10: 7
PAINLEVEL_OUTOF10: 4
PAINLEVEL_OUTOF10: 5
PAINLEVEL_OUTOF10: 4
PAINLEVEL_OUTOF10: 6
PAINLEVEL_OUTOF10: 6
PAINLEVEL_OUTOF10: 0
PAINLEVEL_OUTOF10: 7
PAINLEVEL_OUTOF10: 5
PAINLEVEL_OUTOF10: 5
PAINLEVEL_OUTOF10: 6
PAINLEVEL_OUTOF10: 4
PAINLEVEL_OUTOF10: 7
PAINLEVEL_OUTOF10: 6
PAINLEVEL_OUTOF10: 7
PAINLEVEL_OUTOF10: 5
PAINLEVEL_OUTOF10: 6
PAINLEVEL_OUTOF10: 7
PAINLEVEL: 8=MODERATE-SEVERE PAIN
PAINLEVEL_OUTOF10: 5
PAINLEVEL_OUTOF10: 5
PAINLEVEL_OUTOF10: 6
PAINLEVEL_OUTOF10: 0
PAINLEVEL_OUTOF10: 0
PAINLEVEL_OUTOF10: 2
PAINLEVEL_OUTOF10: 6
PAINLEVEL_OUTOF10: 4
PAINLEVEL_OUTOF10: 7
PAINLEVEL_OUTOF10: 0
PAINLEVEL_OUTOF10: 4
PAINLEVEL_OUTOF10: 5
PAINLEVEL_OUTOF10: 6
PAINLEVEL_OUTOF10: 7
PAINLEVEL_OUTOF10: 6
PAINLEVEL_OUTOF10: 8
PAINLEVEL_OUTOF10: 6
PAINLEVEL_OUTOF10: 6
PAINLEVEL_OUTOF10: 10
PAINLEVEL_OUTOF10: 2
PAINLEVEL_OUTOF10: 7
PAINLEVEL_OUTOF10: 4
PAINLEVEL_OUTOF10: 3
PAINLEVEL_OUTOF10: 0
PAINLEVEL_OUTOF10: 2
PAINLEVEL_OUTOF10: 8
PAINLEVEL_OUTOF10: 4
PAINLEVEL_OUTOF10: 4
PAINLEVEL_OUTOF10: 5
PAINLEVEL_OUTOF10: 7
PAINLEVEL_OUTOF10: 3
PAINLEVEL_OUTOF10: 7
PAINLEVEL_OUTOF10: 4
PAINLEVEL_OUTOF10: 2
PAINLEVEL_OUTOF10: 5
PAINLEVEL_OUTOF10: 3
PAINLEVEL_OUTOF10: 2
PAINLEVEL_OUTOF10: 7
PAINLEVEL_OUTOF10: 7
PAINLEVEL_OUTOF10: 3
PAINLEVEL_OUTOF10: 6
PAINLEVEL_OUTOF10: 7
PAINLEVEL_OUTOF10: 5
PAINLEVEL_OUTOF10: 0
PAINLEVEL_OUTOF10: 0
PAINLEVEL_OUTOF10: 8
PAINLEVEL_OUTOF10: 3
PAINLEVEL_OUTOF10: 5
PAINLEVEL_OUTOF10: 0
PAINLEVEL_OUTOF10: 5
PAINLEVEL_OUTOF10: 5
PAINLEVEL_OUTOF10: 3
PAINLEVEL_OUTOF10: 4
PAINLEVEL_OUTOF10: 5
PAINLEVEL_OUTOF10: 0
PAINLEVEL_OUTOF10: 7
PAINLEVEL_OUTOF10: 3
PAINLEVEL_OUTOF10: 8
PAINLEVEL_OUTOF10: 4
PAINLEVEL_OUTOF10: 7
PAINLEVEL_OUTOF10: 2
PAINLEVEL_OUTOF10: 6
PAINLEVEL_OUTOF10: 5
PAINLEVEL_OUTOF10: 5
PAINLEVEL_OUTOF10: 7
PAINLEVEL_OUTOF10: 6
PAINLEVEL_OUTOF10: 3
PAINLEVEL_OUTOF10: 7
PAINLEVEL: 4=SLIGHT-MODERATE PAIN
PAINLEVEL_OUTOF10: 5
PAINLEVEL_OUTOF10: 0
PAINLEVEL_OUTOF10: 0
PAINLEVEL_OUTOF10: 6
PAINLEVEL_OUTOF10: 7
PAINLEVEL_OUTOF10: 0
PAINLEVEL_OUTOF10: 5
PAINLEVEL_OUTOF10: 2
PAINLEVEL_OUTOF10: 2
PAINLEVEL_OUTOF10: 5
PAINLEVEL: 5=MODERATE PAIN
PAINLEVEL_OUTOF10: 1
PAINLEVEL_OUTOF10: 5
PAINLEVEL_OUTOF10: 3
PAINLEVEL_OUTOF10: 2
PAINLEVEL_OUTOF10: 5
PAINLEVEL_OUTOF10: 4
PAINLEVEL_OUTOF10: 3

## 2018-01-01 ASSESSMENT — ACTIVITIES OF DAILY LIVING (ADL)
MONEY MANAGEMENT (EXPENSES/BILLS): INDEPENDENT
TOILETING: INDEPENDENT
MONEY MANAGEMENT (EXPENSES/BILLS): INDEPENDENT
MONEY MANAGEMENT (EXPENSES/BILLS): NEEDS ASSISTANCE
MONEY MANAGEMENT (EXPENSES/BILLS): INDEPENDENT
DRESSING_REQUIRES_ASSISTANCE: 1
AMBULATION_REQUIRES_ASSISTANCE: 1
MONEY MANAGEMENT (EXPENSES/BILLS): INDEPENDENT
MONEY MANAGEMENT (EXPENSES/BILLS): INDEPENDENT
MONEY MANAGEMENT (EXPENSES/BILLS): TOTALLY DEPENDENT
MONEY MANAGEMENT (EXPENSES/BILLS): INDEPENDENT

## 2018-01-01 ASSESSMENT — SOCIAL DETERMINANTS OF HEALTH (SDOH)
ACTIVE STRESSOR - NO STRESS FACTORS: 1
ACTIVE STRESSOR - HEALTH CHANGES: 1
ACTIVE STRESSOR - HEALTH CHANGES: 1
ACTIVE STRESSOR - LACK OF CAREGIVERS: 1
ACTIVE STRESSOR - NO STRESS FACTORS: 1
ACTIVE STRESSOR - HEALTH CHANGES: 1
ACTIVE STRESSOR - NO STRESS FACTORS: 1
ACTIVE STRESSOR - HEALTH CHANGES: 1
ACTIVE STRESSOR - NO STRESS FACTORS: 1
EXPRESSED_FINANCIAL_NEED: 1
ACTIVE STRESSOR - NO STRESS FACTORS: 1
FINANCIAL_CONCERNS: 1
FINANCIAL_CONCERNS: 1
EXPRESSED_FINANCIAL_NEED: 1
ACTIVE STRESSOR - HEALTH CHANGES: 1
ACTIVE STRESSOR - NO STRESS FACTORS: 1
EXPRESSED_FINANCIAL_NEED: 1
ACTIVE STRESSOR - HEALTH CHANGES: 1
ACTIVE STRESSOR - EXPRESSED EMOTIONAL NEED: 1
ACTIVE STRESSOR - HEALTH CHANGES: 1
EXPRESSED_FINANCIAL_NEED: 1
ACTIVE STRESSOR - EXPRESSED EMOTIONAL NEED: 1
ACTIVE STRESSOR - NO STRESS FACTORS: 1
ACTIVE STRESSOR - NO STRESS FACTORS: 1
ACTIVE STRESSOR - EXPRESSED EMOTIONAL NEED: 1
ACTIVE STRESSOR - HEALTH CHANGES: 1
FINANCIAL_CONCERNS: 1
ACTIVE STRESSOR - LOSS OF CONTROL: 1
ACTIVE STRESSOR - EXPRESSED EMOTIONAL NEED: 1

## 2018-01-01 ASSESSMENT — COGNITIVE AND FUNCTIONAL STATUS - GENERAL
DRESSING REGULAR LOWER BODY CLOTHING: A LITTLE
CLIMB 3 TO 5 STEPS WITH RAILING: A LITTLE
WALKING IN HOSPITAL ROOM: A LITTLE
SUGGESTED CMS G CODE MODIFIER DAILY ACTIVITY: CI
STANDING UP FROM CHAIR USING ARMS: A LITTLE
HELP NEEDED FOR BATHING: A LITTLE
TOILETING: A LITTLE
DRESSING REGULAR UPPER BODY CLOTHING: A LITTLE
DAILY ACTIVITIY SCORE: 23
MOBILITY SCORE: 21
DAILY ACTIVITIY SCORE: 20
HELP NEEDED FOR BATHING: A LITTLE
SUGGESTED CMS G CODE MODIFIER DAILY ACTIVITY: CJ
SUGGESTED CMS G CODE MODIFIER MOBILITY: CJ

## 2018-01-01 ASSESSMENT — GAIT ASSESSMENTS
DEVIATION: BRADYKINETIC
ASSISTIVE DEVICE: FRONT WHEEL WALKER
DISTANCE (FEET): 150
GAIT LEVEL OF ASSIST: STAND BY ASSIST

## 2018-01-01 ASSESSMENT — PATIENT HEALTH QUESTIONNAIRE - PHQ9
2. FEELING DOWN, DEPRESSED, IRRITABLE, OR HOPELESS: NOT AT ALL
SUM OF ALL RESPONSES TO PHQ9 QUESTIONS 1 AND 2: 0
1. LITTLE INTEREST OR PLEASURE IN DOING THINGS: NOT AT ALL

## 2018-01-01 ASSESSMENT — LIFESTYLE VARIABLES
DO YOU DRINK ALCOHOL: NO
SUBSTANCE_ABUSE: 0
EVER_SMOKED: YES
EVER_SMOKED: YES
DO YOU DRINK ALCOHOL: NO
DO YOU DRINK ALCOHOL: NO

## 2018-01-01 ASSESSMENT — PAIN DESCRIPTION - DESCRIPTORS: DESCRIPTORS: ACHING

## 2018-01-10 ENCOUNTER — OFFICE VISIT (OUTPATIENT)
Dept: MEDICAL GROUP | Facility: MEDICAL CENTER | Age: 68
End: 2018-01-10
Payer: MEDICARE

## 2018-01-10 VITALS
OXYGEN SATURATION: 98 % | TEMPERATURE: 98.6 F | HEART RATE: 99 BPM | WEIGHT: 159 LBS | BODY MASS INDEX: 24.96 KG/M2 | DIASTOLIC BLOOD PRESSURE: 80 MMHG | HEIGHT: 67 IN | RESPIRATION RATE: 16 BRPM | SYSTOLIC BLOOD PRESSURE: 130 MMHG

## 2018-01-10 DIAGNOSIS — F32.1 MODERATE SINGLE CURRENT EPISODE OF MAJOR DEPRESSIVE DISORDER (HCC): ICD-10-CM

## 2018-01-10 DIAGNOSIS — E78.5 DYSLIPIDEMIA: ICD-10-CM

## 2018-01-10 DIAGNOSIS — K21.9 GASTROESOPHAGEAL REFLUX DISEASE, ESOPHAGITIS PRESENCE NOT SPECIFIED: ICD-10-CM

## 2018-01-10 DIAGNOSIS — D75.89 MACROCYTOSIS: ICD-10-CM

## 2018-01-10 DIAGNOSIS — K59.1 FUNCTIONAL DIARRHEA: ICD-10-CM

## 2018-01-10 DIAGNOSIS — N32.81 OVERACTIVE BLADDER: ICD-10-CM

## 2018-01-10 DIAGNOSIS — Z13.29 SCREENING FOR THYROID DISORDER: ICD-10-CM

## 2018-01-10 DIAGNOSIS — Z85.3 HISTORY OF BREAST CANCER IN FEMALE: ICD-10-CM

## 2018-01-10 LAB
APPEARANCE UR: CLEAR
BILIRUB UR STRIP-MCNC: NORMAL MG/DL
COLOR UR AUTO: YELLOW
GLUCOSE UR STRIP.AUTO-MCNC: NORMAL MG/DL
KETONES UR STRIP.AUTO-MCNC: NORMAL MG/DL
LEUKOCYTE ESTERASE UR QL STRIP.AUTO: NORMAL
NITRITE UR QL STRIP.AUTO: NORMAL
PH UR STRIP.AUTO: 6 [PH] (ref 5–8)
PROT UR QL STRIP: NORMAL MG/DL
RBC UR QL AUTO: NORMAL
SP GR UR STRIP.AUTO: 1.01
UROBILINOGEN UR STRIP-MCNC: NORMAL MG/DL

## 2018-01-10 PROCEDURE — 81002 URINALYSIS NONAUTO W/O SCOPE: CPT | Performed by: FAMILY MEDICINE

## 2018-01-10 PROCEDURE — 99214 OFFICE O/P EST MOD 30 MIN: CPT | Performed by: FAMILY MEDICINE

## 2018-01-10 RX ORDER — ESCITALOPRAM OXALATE 20 MG/1
10 TABLET ORAL DAILY
COMMUNITY
Start: 2018-01-01 | End: 2018-01-01

## 2018-01-10 RX ORDER — LISINOPRIL 20 MG/1
20 TABLET ORAL DAILY
Status: ON HOLD | COMMUNITY
End: 2018-01-01

## 2018-01-10 ASSESSMENT — PATIENT HEALTH QUESTIONNAIRE - PHQ9
5. POOR APPETITE OR OVEREATING: 3 - NEARLY EVERY DAY
CLINICAL INTERPRETATION OF PHQ2 SCORE: 6
SUM OF ALL RESPONSES TO PHQ QUESTIONS 1-9: 15

## 2018-01-10 NOTE — ASSESSMENT & PLAN NOTE
Patient has a history of functional diarrhea with 4-6 stools a day. She has had full workups for this in the past but these symptoms have been going on for the last month. She denies any black or bloody stools. She reports that she feels exhausted from all of this. She has had some weight loss.

## 2018-01-10 NOTE — ASSESSMENT & PLAN NOTE
She has been on the escitalopram for 3 months.  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 The escitalopram seems to be helping. She denies any suicidal thoughts or plans.

## 2018-01-15 ENCOUNTER — HOSPITAL ENCOUNTER (OUTPATIENT)
Dept: LAB | Facility: MEDICAL CENTER | Age: 68
End: 2018-01-15
Attending: FAMILY MEDICINE
Payer: MEDICARE

## 2018-01-15 DIAGNOSIS — Z13.29 SCREENING FOR THYROID DISORDER: ICD-10-CM

## 2018-01-15 DIAGNOSIS — K21.9 GASTROESOPHAGEAL REFLUX DISEASE, ESOPHAGITIS PRESENCE NOT SPECIFIED: ICD-10-CM

## 2018-01-15 DIAGNOSIS — E78.5 DYSLIPIDEMIA: ICD-10-CM

## 2018-01-15 DIAGNOSIS — K59.1 FUNCTIONAL DIARRHEA: ICD-10-CM

## 2018-01-15 DIAGNOSIS — Z85.3 HISTORY OF BREAST CANCER IN FEMALE: ICD-10-CM

## 2018-01-15 DIAGNOSIS — D75.89 MACROCYTOSIS: ICD-10-CM

## 2018-01-15 LAB
ALBUMIN SERPL BCP-MCNC: 4.1 G/DL (ref 3.2–4.9)
ALBUMIN/GLOB SERPL: 1.5 G/DL
ALP SERPL-CCNC: 91 U/L (ref 30–99)
ALT SERPL-CCNC: 10 U/L (ref 2–50)
ANION GAP SERPL CALC-SCNC: 8 MMOL/L (ref 0–11.9)
AST SERPL-CCNC: 24 U/L (ref 12–45)
BASOPHILS # BLD AUTO: 0.7 % (ref 0–1.8)
BASOPHILS # BLD: 0.05 K/UL (ref 0–0.12)
BILIRUB SERPL-MCNC: 0.3 MG/DL (ref 0.1–1.5)
BUN SERPL-MCNC: 6 MG/DL (ref 8–22)
CALCIUM SERPL-MCNC: 9.1 MG/DL (ref 8.5–10.5)
CHLORIDE SERPL-SCNC: 108 MMOL/L (ref 96–112)
CHOLEST SERPL-MCNC: 182 MG/DL (ref 100–199)
CO2 SERPL-SCNC: 24 MMOL/L (ref 20–33)
CREAT SERPL-MCNC: 0.54 MG/DL (ref 0.5–1.4)
EOSINOPHIL # BLD AUTO: 0.16 K/UL (ref 0–0.51)
EOSINOPHIL NFR BLD: 2.2 % (ref 0–6.9)
ERYTHROCYTE [DISTWIDTH] IN BLOOD BY AUTOMATED COUNT: 50.3 FL (ref 35.9–50)
FOLATE SERPL-MCNC: >23.5 NG/ML
GLOBULIN SER CALC-MCNC: 2.7 G/DL (ref 1.9–3.5)
GLUCOSE SERPL-MCNC: 87 MG/DL (ref 65–99)
HCT VFR BLD AUTO: 40 % (ref 37–47)
HCV AB SER QL: NEGATIVE
HDLC SERPL-MCNC: 62 MG/DL
HGB BLD-MCNC: 14.2 G/DL (ref 12–16)
IMM GRANULOCYTES # BLD AUTO: 0.02 K/UL (ref 0–0.11)
IMM GRANULOCYTES NFR BLD AUTO: 0.3 % (ref 0–0.9)
LDLC SERPL CALC-MCNC: 95 MG/DL
LYMPHOCYTES # BLD AUTO: 2.13 K/UL (ref 1–4.8)
LYMPHOCYTES NFR BLD: 29.2 % (ref 22–41)
MCH RBC QN AUTO: 36.9 PG (ref 27–33)
MCHC RBC AUTO-ENTMCNC: 35.5 G/DL (ref 33.6–35)
MCV RBC AUTO: 103.9 FL (ref 81.4–97.8)
MONOCYTES # BLD AUTO: 0.76 K/UL (ref 0–0.85)
MONOCYTES NFR BLD AUTO: 10.4 % (ref 0–13.4)
NEUTROPHILS # BLD AUTO: 4.17 K/UL (ref 2–7.15)
NEUTROPHILS NFR BLD: 57.2 % (ref 44–72)
NRBC # BLD AUTO: 0 K/UL
NRBC BLD-RTO: 0 /100 WBC
PLATELET # BLD AUTO: 344 K/UL (ref 164–446)
PMV BLD AUTO: 10.8 FL (ref 9–12.9)
POTASSIUM SERPL-SCNC: 4.3 MMOL/L (ref 3.6–5.5)
PROT SERPL-MCNC: 6.8 G/DL (ref 6–8.2)
RBC # BLD AUTO: 3.85 M/UL (ref 4.2–5.4)
SODIUM SERPL-SCNC: 140 MMOL/L (ref 135–145)
TRIGL SERPL-MCNC: 127 MG/DL (ref 0–149)
TSH SERPL DL<=0.005 MIU/L-ACNC: 0.97 UIU/ML (ref 0.38–5.33)
VIT B12 SERPL-MCNC: 566 PG/ML (ref 211–911)
WBC # BLD AUTO: 7.3 K/UL (ref 4.8–10.8)

## 2018-01-15 PROCEDURE — 80061 LIPID PANEL: CPT

## 2018-01-15 PROCEDURE — 85025 COMPLETE CBC W/AUTO DIFF WBC: CPT

## 2018-01-15 PROCEDURE — 84443 ASSAY THYROID STIM HORMONE: CPT

## 2018-01-15 PROCEDURE — 83516 IMMUNOASSAY NONANTIBODY: CPT

## 2018-01-15 PROCEDURE — 82784 ASSAY IGA/IGD/IGG/IGM EACH: CPT

## 2018-01-15 PROCEDURE — 36415 COLL VENOUS BLD VENIPUNCTURE: CPT

## 2018-01-15 PROCEDURE — 82607 VITAMIN B-12: CPT

## 2018-01-15 PROCEDURE — 86803 HEPATITIS C AB TEST: CPT

## 2018-01-15 PROCEDURE — 80053 COMPREHEN METABOLIC PANEL: CPT

## 2018-01-15 PROCEDURE — 82746 ASSAY OF FOLIC ACID SERUM: CPT

## 2018-01-15 NOTE — ASSESSMENT & PLAN NOTE
Patient reports that she has an overactive bladder. She has to get up multiple times during the night to urinate as well as throughout the day. She denies any blood, fever or chills, flank pain.

## 2018-01-15 NOTE — PROGRESS NOTES
Chief Complaint   Patient presents with   • Establish Care   • Diarrhea         Saadia Crook is a 67 y.o. female here to establish care and for evaluation and management of:        HPI:    Moderate single current episode of major depressive disorder (CMS-HCC)  She has been on the escitalopram for 3 months.  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 The escitalopram seems to be helping. She denies any suicidal thoughts or plans.    Functional diarrhea  Patient has a history of functional diarrhea with 4-6 stools a day. She has had full workups for this in the past but these symptoms have been going on for the last month. She denies any black or bloody stools. She reports that she feels exhausted from all of this. She has had some weight loss.    Overactive bladder  Patient reports that she has an overactive bladder. She has to get up multiple times during the night to urinate as well as throughout the day. She denies any blood, fever or chills, flank pain.    Dyslipidemia  Patient has a history of breast cancer in 2005. She had a lumpectomy and radiation    GERD (gastroesophageal reflux disease)  Stable.Takes med daily as directed. Denies side effects.  Denies early satiety, unintentional weight loss, choking, persistent burning pain in chest or upper abdomen.        No Known Allergies    Current medicines (including changes today)  Current Outpatient Prescriptions   Medication Sig Dispense Refill   • escitalopram (LEXAPRO) 20 MG tablet Take 20 mg by mouth every day.     • lisinopril (PRINIVIL) 20 MG Tab Take 20 mg by mouth every day.     • pantoprazole (PROTONIX) 40 MG Tablet Delayed Response Take 40 mg by mouth every day.     • hydrocodone-acetaminophen (NORCO) 5-325 MG Tab per tablet Take 1 Tab by mouth every 6 hours as needed. 15 Tab 0   • predniSONE (DELTASONE) 20 MG Tab Take one pill twice a day for five days 10 Tab 0   • asa/apap/caffeine  "(EXCEDRIN) 250-250-65 MG Tab Take 1 Tab by mouth every 6 hours as needed for Headache.     • amitriptyline (ELAVIL) 25 MG TABS Take  by mouth 3 times a day.       No current facility-administered medications for this visit.      She  has a past medical history of Cancer (CMS-HCC) (); Diverticulitis; Essential hypertension, benign (6/15/2016); Hiatus hernia syndrome; Indigestion; Kidney stones; and Peptic ulcer.  She  has a past surgical history that includes gyn surgery; other; gastroscopy with biopsy (10/11/2011); colonoscopy with polyp (10/11/2011); abdominal exploration; appendectomy; tonsillectomy and adenoidectomy; abdominal hysterectomy total; and lumpectomy ().  Social History   Substance Use Topics   • Smoking status: Current Every Day Smoker     Packs/day: 0.25     Years: 30.00     Types: Cigarettes   • Smokeless tobacco: Never Used   • Alcohol use 1.2 oz/week     2 Cans of beer per week      Comment: occ     Social History     Social History Narrative   • No narrative on file     Family History   Problem Relation Age of Onset   • Cancer Mother      breast   • Hypertension Mother    • Stroke Father    • Heart Attack Neg Hx    • Heart Disease Neg Hx    • Heart Failure Neg Hx      Family Status   Relation Status   • Mother    • Father    • Neg Hx          ROS  No fever or chills.  No nausea or vomiting.  No chest pain or palpitations.  No cough or SOB.  No pain with urination or hematuria.  No black or bloody stools.  All other systems reviewed and are negative     Objective:     Blood pressure 130/80, pulse 99, temperature 37 °C (98.6 °F), resp. rate 16, height 1.702 m (5' 7\"), weight 72.1 kg (159 lb), SpO2 98 %. Body mass index is 24.9 kg/m².  Physical Exam:      Well developed, well nourished.  Alert, oriented in no acute distress.  Psych: Eye contact is good, speech goal directed, affect calm  Eyes: conjunctiva non-injected, sclera non-icteric.  Ears: Pinna normal. TM pearly gray. "   Nose: Nares are patent.  Normal mucosa  Mouth: Oral mucous membranes pink and moist with no lesions.  Neck Supple.  No adenopathy or masses in the neck or supraclavicular regions. No thyromegaly  Lungs: clear to auscultation bilaterally with good excursion. No wheezes or rhonchi  CV: regular rate and rhythm. No murmur  Abdomen: soft, diffuse tenderness, no masses or organomegaly.  No rebound or gaurding  Ext: no edema, color normal, vascularity normal, temperature normal      Assessment and Plan:   The following treatment plan was discussed    1. Moderate single current episode of major depressive disorder (CMS-HCC)  Continue escitalopram.    2. Functional diarrhea  Labs as ordered. Await results. Recheck 2 weeks May need GI referral  - CBC WITH DIFFERENTIAL; Future  - COMP METABOLIC PANEL; Future  - CELIAC DISEASE AB PANEL; Future  - HEP C VIRUS ANTIBODY; Future  - CDIFF BY PCR; Future  - CRYPTO/GIARDIA RAPID ASSAY; Future  - CULTURE STOOL; Future  - H.PYLORI STOOL ANTIGEN; Future  - STOOL WBC'S; Future  - STOOL REDUCING SUBST; Future    3. Overactive bladder  Normal UA. Consider medication  - POCT Urinalysis    4. History of breast cancer in female  Follow-up with oncology  - CBC WITH DIFFERENTIAL; Future  - COMP METABOLIC PANEL; Future    5. Dyslipidemia  Recheck labs  - COMP METABOLIC PANEL; Future  - LIPID PROFILE; Future    6. Gastroesophageal reflux disease, esophagitis presence not specified  Continue current medications  - CBC WITH DIFFERENTIAL; Future  - H.PYLORI STOOL ANTIGEN; Future    7. Macrocytosis  Check for causes  - CBC WITH DIFFERENTIAL; Future  - VITAMIN B12; Future  - FOLATE; Future    8. Screening for thyroid disorder  Screening labs ordered.  Await results for counseling.    - TSH; Future      Records requested.    Any change or worsening of signs or symptoms, patient encouraged to follow-up or report to the emergency room for further evaluation. Patient understands and agrees.    Followup:  Return in about 2 weeks (around 1/24/2018).

## 2018-01-16 ENCOUNTER — HOSPITAL ENCOUNTER (OUTPATIENT)
Facility: MEDICAL CENTER | Age: 68
End: 2018-01-16
Attending: FAMILY MEDICINE
Payer: MEDICARE

## 2018-01-16 LAB
IGA SERPL-MCNC: 99 MG/DL (ref 68–408)
TTG IGA SER IA-ACNC: 0 U/ML (ref 0–3)

## 2018-01-16 PROCEDURE — 87899 AGENT NOS ASSAY W/OPTIC: CPT

## 2018-01-16 PROCEDURE — 89055 LEUKOCYTE ASSESSMENT FECAL: CPT

## 2018-01-16 PROCEDURE — 87338 HPYLORI STOOL AG IA: CPT

## 2018-01-16 PROCEDURE — 87045 FECES CULTURE AEROBIC BACT: CPT

## 2018-01-16 PROCEDURE — 369999 HCHG MISC LAB CHARGE

## 2018-01-16 PROCEDURE — 87493 C DIFF AMPLIFIED PROBE: CPT

## 2018-01-16 PROCEDURE — 84376 SUGARS SINGLE QUAL: CPT

## 2018-01-16 PROCEDURE — 87046 STOOL CULTR AEROBIC BACT EA: CPT

## 2018-01-16 PROCEDURE — 87328 CRYPTOSPORIDIUM AG IA: CPT

## 2018-01-16 PROCEDURE — 87329 GIARDIA AG IA: CPT

## 2018-01-17 DIAGNOSIS — K59.1 FUNCTIONAL DIARRHEA: ICD-10-CM

## 2018-01-17 DIAGNOSIS — K21.9 GASTROESOPHAGEAL REFLUX DISEASE, ESOPHAGITIS PRESENCE NOT SPECIFIED: ICD-10-CM

## 2018-01-17 LAB
C DIFF DNA SPEC QL NAA+PROBE: NEGATIVE
C DIFF TOX GENS STL QL NAA+PROBE: NEGATIVE
G LAMBLIA+C PARVUM AG STL QL RAPID: NORMAL
SIGNIFICANT IND 70042: NORMAL
SITE SITE: NORMAL
SOURCE SOURCE: NORMAL
WBC STL QL MICRO: NORMAL

## 2018-01-18 LAB
E COLI SXT1+2 STL IA: NORMAL
MISCELLANEOUS LAB RESULT MISCLAB: NORMAL
SIGNIFICANT IND 70042: NORMAL
SITE SITE: NORMAL
SOURCE SOURCE: NORMAL

## 2018-04-10 PROBLEM — K57.90 DIVERTICULOSIS: Chronic | Status: ACTIVE | Noted: 2018-01-01

## 2018-04-10 PROBLEM — E27.8 ADRENAL NODULE (HCC): Status: ACTIVE | Noted: 2018-01-01

## 2018-04-10 PROBLEM — C80.1 MALIGNANCY (HCC): Status: ACTIVE | Noted: 2018-01-01

## 2018-04-10 PROBLEM — G93.6 INTRACRANIAL EDEMA (HCC): Status: ACTIVE | Noted: 2018-01-01

## 2018-04-10 PROBLEM — D17.71 ANGIOLIPOMA OF RIGHT KIDNEY: Status: ACTIVE | Noted: 2018-01-01

## 2018-04-10 PROBLEM — I62.9 INTRACRANIAL HEMORRHAGE (HCC): Status: ACTIVE | Noted: 2018-01-01

## 2018-04-10 PROBLEM — R56.9 SEIZURE (HCC): Status: ACTIVE | Noted: 2018-01-01

## 2018-04-10 PROBLEM — R91.1 LUNG NODULE: Status: ACTIVE | Noted: 2018-01-01

## 2018-04-10 NOTE — ASSESSMENT & PLAN NOTE
Likely secondary to metastatic lesions in brain. No further seizure activity noted    Plan  - Continue Keppra 500 mg BID  - Neuro checks  - Aspiration, seizure, fall precautions

## 2018-04-10 NOTE — H&P
SENIOR ADMIT NOTE    DATE OF SERVICE:  04/10/2018    CHIEF COMPLAINT:  Dizziness, seizures, syncopal event.    HISTORY OF PRESENT ILLNESS:  In brief, this is a very pleasant 67-year-old   female who presented to the ER today after she had some dizziness and   lightheadedness.  She reports having woke up in the morning and feeling   lightheaded and decided to come into the ER.  She has a rather significant   recent history with symptoms first beginning a week ago, then she reports   having a syncopal event where she lost consciousness.  She remembers falling   down, but does not have much recollection of the event after.  She had some   bowel incontinence at that time, but no urinary incontinence or tongue biting.    It was an unwitnessed event and she is unsure if she had any shaking   motions.  Last night, however, she reports that her jaw started shaking and   gradually progressed to her upper extremities and she had these episodes   for about 5 minutes.  She can talk at this time and she motioned for her    to call 911 and by the time the paramedics have arrived, her symptoms   have resolved.  EMS reportedly advised her to go into the ER, but the patient   elected not to and was going to follow up with her primary care physician for   these symptoms.    Today morning, she woke up and having the lightheadedness she felt concerned   and decided to come into the ER.  She initially went to [facility] where a   CT of the head was done, which found 2 masses in her frontal lobes bilaterally   and there was hemorrhagic conversion of the mass in the right frontal lobe.    She was sent here for neurology and neurosurgery consultation.  In the ER at   [facility], her blood pressure was 188/96, but on arrival here, it was   recorded at 126/84.  She is currently asymptomatic at the moment.    PAST MEDICAL HISTORY:  1.  Breast cancer, status post lumpectomy in 1998 followed by 6 weeks of   radiation therapy.  2.   Hypertension.  3.  Colitis.  4.  Sinus tachycardia.    PAST SURGICAL HISTORY:  1.  Lumpectomy.  2.  Adenoidectomy.  3.  Small bowel resection as a child.  4.  Hysterectomy.    FAMILY HISTORY:  Father is still alive at age 102, mother reportedly had both   breasts removed and has had 2 tumors, which are unknown to the patient.    SOCIAL HISTORY:  Current everyday smoker, smokes about 5 cigarettes a day and   has been trying to cut back.  She has 30-pack years and smoking.  Occasional   alcohol use and no recreational drug use.    MEDICATIONS:  1.  Lisinopril 20 mg daily.  2.  Lexapro 20 mg daily.  3.  Pantoprazole 40 mg daily.  4.  Excedrin b.i.d. p.r.n.    ALLERGIES:  No known drug allergies.    REVIEW OF SYSTEMS:  CONSTITUTIONAL:  Denies fevers, chills.  HEENT:  Denies nasal congestion, sore throat.  RESPIRATORY:  Denies shortness of breath, cough.  CARDIOVASCULAR:  Denies chest pain, palpitations.  ABDOMEN:  Denies nausea, vomiting, abdominal pain.  GENITOURINARY:  Denies dysuria, frequency.  NEUROLOGICAL:  Reports dizziness, headaches, tremors, syncope.  PSYCHIATRY:  Reports history of depression, currently denies suicidal ideation   or homicidal ideation.    PHYSICAL EXAMINATION:  VITAL SIGNS:  Blood pressure 126/82, heart rate 92, afebrile, respiratory rate   of 18, oxygen saturation of 95% on room air.  CONSTITUTIONAL:  Appears comfortable.  HEENT:  Normocephalic, atraumatic.  RESPIRATORY:  Clear to auscultation bilaterally.  CARDIOVASCULAR:  Tachycardic.  No murmurs, rubs or gallops.  ABDOMEN:  Soft, nontender.  Normal bowel sounds.  EXTREMITIES:  No pedal edema.  NEUROLOGIC:  Cranial nerves II-XII grossly intact.  Sensation 5/5 and equal   bilaterally.  Reflexes are 2+.    LABORATORY DATA:  Hemoglobin of 14.6, WBC 9.3.  Sodium 131, potassium 4.1,   troponin of 0.04, INR of 0.9, APTT of 34.5.    IMAGING STUDIES:  CT of the head without contrast shows bilateral   intraparenchymal masses, largest one being the  right frontal lobe with   surrounding vasogenic edema, right frontal lobe mass also has hemorrhagic   conversion.    ASSESSMENT AND PLAN:  1.  Bilateral frontal lobe masses:  This might unfortunately represent   metastatic disease with an unknown primary at this point.  Most common   metastasis to the brain, his lung, and based on tobacco history could be   possible; however, the patient also reports to have noticed new skin lesions   over the past few months, therefore melanoma might also be possible and   strongly suspected.  Neurosurgery has been consulted in the ER by Dr. Moulton, and Dr. Masterson will follow the patient.  For now, she is on Keppra for   seizure prophylaxis and Decadron to reduce the mass effect.  She is on   seizure precautions and will also be on q. 4 hour neuro checks.  2.  Hemorrhagic conversion of right frontal mass:  The patient is on q. 4 hour   neuro checks and blood pressure control is important and we will keep the   target blood pressure below 160/90 per current guidelines.  Blood pressure   also to be checked every 4 hours.  3.  Hypertension:  Continue home dose of lisinopril for now at 20 mg daily.    P.r.n. medication in place to prevent blood pressure from going above 160/90.  4.  Colitis: stable.  5.  Sinus tachycardia: unknown etiology, we will monitor.  6.  Skin rash:  The patient reports it to be a drug reaction and relates it to   lisinopril.  However, rash looks exfoliative in nature and does not seem to   be a drug reaction.  We will add Vaseline lotion on for now and monitor, but   the patient has been stable on the medication for a few months now.  7.  History of breast cancer: treated in 1998, doubt recurrence as it is long   history.  In fact, I suspect probably lung or melanoma as a primary.    Core measures have been addressed appropriately.  DVT prophylaxis is avoided   due to the hemorrhagic conversion of the mass.  We will only do SCDs.  The   patient is full code  currently.    Please refer to intern's H and P for more details.       ____________________________________     MD SANKET Dietz / REY    DD:  04/10/2018 12:53:49  DT:  04/10/2018 14:11:43    D#:  [id number]  Job#:  [id number]

## 2018-04-10 NOTE — ED NOTES
Report to [facility], pt is stable on transport and understands why she is going downtown. Alert, no seizure activity. All paperwork signed and given to [facility]. No questions on release.

## 2018-04-10 NOTE — ED PROVIDER NOTES
ED Provider Note    CHIEF COMPLAINT  Dizziness    HPI  Saadia Connie Crook is a 67 y.o. female who presents to the emergency department after an episode of shaking. Patient describes a history of rather chronic headaches. They've been worse recently. About a week ago she felt lightheaded as well as felt a spinning sensation. She ended up collapsing to the ground. She remembers falling. She thinks that she was knocked unconscious. When she woke up she had defecated on herself. No tongue biting or urinary incontinence. Yesterday she had an event where her jaw started quivering described as opening and closing of her mouth rapidly which progressed to shaking over her upper extremities and upper torso shaking. This lasted for about 4 minutes. During the event she couldn't talk, but she was able to write on a piece of paper for her  to call an ambulance. Upon EMS arrival her symptoms have resolved. She elected not to come to the hospital. This morning she woke up. She felt lightheaded and also felt like the walls were moving and therefore came to the ER. She denies any ongoing symptoms. She states she periodically will have numbness in her left arm and hand when she wakes up. This is happened over the last few months. No other focal weakness numbness. No vision changes. Never chest pain or shortness of breath. No abdominal pain    REVIEW OF SYSTEMS  As per HPI All other systems reviewed and are negative.     PAST MEDICAL HISTORY  Past Medical History:   Diagnosis Date   • Cancer (CMS-HCC) 1995    breast ca   • Diverticulitis    • Essential hypertension, benign 6/15/2016   • Hiatus hernia syndrome    • Indigestion    • Kidney stones    • Peptic ulcer     small antral ulcer found on EGD 2011       FAMILY HISTORY  Family History   Problem Relation Age of Onset   • Cancer Mother      breast   • Hypertension Mother    • Stroke Father    • Heart Attack Neg Hx    • Heart Disease Neg Hx    • Heart Failure Neg Hx   "      SOCIAL HISTORY  Social History   Substance Use Topics   • Smoking status: Current Every Day Smoker     Packs/day: 0.25     Years: 30.00     Types: Cigarettes   • Smokeless tobacco: Never Used   • Alcohol use 1.2 oz/week     2 Cans of beer per week       SURGICAL HISTORY  Past Surgical History:   Procedure Laterality Date   • GASTROSCOPY WITH BIOPSY  10/11/2011    Performed by TASHIA CRAIG at ENDOSCOPY [facility]S   • COLONOSCOPY WITH POLYP  10/11/2011    Performed by TASHIA CRAIG at ENDOSCOPY [facility] ORS   • LUMPECTOMY  1978    right   • ABDOMINAL EXPLORATION     • ABDOMINAL HYSTERECTOMY TOTAL      BSO   • APPENDECTOMY     • GYN SURGERY      hysterectomy   • OTHER      tonsilectomy   • TONSILLECTOMY AND ADENOIDECTOMY         CURRENT MEDICATIONS  Home Medications     Reviewed by Marshall Mccloud (Pharmacy Tech) on 04/10/18 at [facility]  Med List Status: Complete   Medication Last Dose Status   asa/apap/caffeine (EXCEDRIN) 250-250-65 MG Tab 4/10/2018 Active   ascorbic acid (ASCORBIC ACID) 500 MG Tab 4/9/2018 Active   Cyanocobalamin (B-12 PO) 4/9/2018 Active   escitalopram (LEXAPRO) 20 MG tablet 4/9/2018 Active   lisinopril (PRINIVIL) 20 MG Tab 4/9/2018 Active   pantoprazole (PROTONIX) 40 MG Tablet Delayed Response 4/10/2018 Active                ALLERGIES  No Known Allergies    PHYSICAL EXAM  VITAL SIGNS: BP (!) 188/95   Pulse (!) 106   Temp 37.2 °C (98.9 °F)   Resp 16   Ht 1.727 m (5' 8\")   Wt 68.6 kg (151 lb 3.8 oz)   SpO2 96%   BMI 23.00 kg/m²   Constitutional: Awake and alert  HENT: Head atraumatic, ears normal inspection, oropharynx is benign with moist mucous membranes, nares clear  Eyes: Pupils equal round reactive, extraocular muscles are intact. No ptosis or miosis.  Neck: Neck is nontender  Cardiovascular: Normal heart rate, Normal rhythm  Thorax & Lungs: Normal breath sounds, No respiratory distress, No wheezing, No chest tenderness.   Abdomen: Soft, No tenderness, No masses, No " pulsatile masses.   Skin: No pathologic rash  Extremities: Intact distal pulses, No edema, No tenderness, No cyanosis, No clubbing.   Neurologic: Awake alert and oriented. Good strength throughout upper and lower extremity proximal and distal muscle groups. Normal sensation. Cranial nerves II through XII are intact. Normal finger to nose testing. Steady gait.      RADIOLOGY/PROCEDURES  CT-HEAD W/O   Final Result      Bilateral intraparenchymal intracranial masses the largest of which is located within the right frontal lobe with surrounding vasogenic edema. The right frontal mass is somewhat high in density consistent with a component of hemorrhage. This mass    measures 2 cm in size. These masses are most consistent with metastatic disease.      This was discussed with SHANA MYERS at 10:10 AM on 4/10/2018.            Imaging is interpreted by radiologist    LABS:  Results for orders placed or performed during the hospital encounter of 04/10/18   CBC WITH DIFFERENTIAL   Result Value Ref Range    WBC 9.3 4.8 - 10.8 K/uL    RBC 4.19 (L) 4.20 - 5.40 M/uL    Hemoglobin 14.6 12.0 - 16.0 g/dL    Hematocrit 40.4 37.0 - 47.0 %    MCV 96.4 81.4 - 97.8 fL    MCH 34.8 (H) 27.0 - 33.0 pg    MCHC 36.1 (H) 33.6 - 35.0 g/dL    RDW 45.8 35.9 - 50.0 fL    Platelet Count 400 164 - 446 K/uL    MPV 8.9 (L) 9.0 - 12.9 fL    Neutrophils-Polys 56.80 44.00 - 72.00 %    Lymphocytes 28.60 22.00 - 41.00 %    Monocytes 11.60 0.00 - 13.40 %    Eosinophils 2.10 0.00 - 6.90 %    Basophils 0.60 0.00 - 1.80 %    Immature Granulocytes 0.30 0.00 - 0.90 %    Nucleated RBC 0.00 /100 WBC    Neutrophils (Absolute) 5.26 2.00 - 7.15 K/uL    Lymphs (Absolute) 2.65 1.00 - 4.80 K/uL    Monos (Absolute) 1.07 (H) 0.00 - 0.85 K/uL    Eos (Absolute) 0.19 0.00 - 0.51 K/uL    Baso (Absolute) 0.06 0.00 - 0.12 K/uL    Immature Granulocytes (abs) 0.03 0.00 - 0.11 K/uL    NRBC (Absolute) 0.00 K/uL   COMP METABOLIC PANEL   Result Value Ref Range    Sodium 131 (L)  135 - 145 mmol/L    Potassium 4.1 3.6 - 5.5 mmol/L    Chloride 98 96 - 112 mmol/L    Co2 22 20 - 33 mmol/L    Anion Gap 11.0 0.0 - 11.9    Glucose 98 65 - 99 mg/dL    Bun 14 8 - 22 mg/dL    Creatinine 0.88 0.50 - 1.40 mg/dL    Calcium 9.1 8.4 - 10.2 mg/dL    AST(SGOT) 22 12 - 45 U/L    ALT(SGPT) 18 2 - 50 U/L    Alkaline Phosphatase 89 30 - 99 U/L    Total Bilirubin 0.4 0.1 - 1.5 mg/dL    Albumin 4.3 3.2 - 4.9 g/dL    Total Protein 7.5 6.0 - 8.2 g/dL    Globulin 3.2 1.9 - 3.5 g/dL    A-G Ratio 1.3 g/dL   TROPONIN   Result Value Ref Range    Troponin I 0.04 0.00 - 0.04 ng/mL   URINALYSIS CULTURE, IF INDICATED   Result Value Ref Range    Micro Urine Req see below     Culture Indicated No UA Culture    Color Yellow     Character Clear     Specific Gravity 1.020 <1.035    Ph 6.0 5.0 - 8.0    Glucose Negative Negative mg/dL    Ketones 15 (A) Negative mg/dL    Protein Negative Negative mg/dL    Bilirubin Small (A) Negative    Nitrite Negative Negative    Leukocyte Esterase Negative Negative    Occult Blood Negative Negative   ESTIMATED GFR   Result Value Ref Range    GFR If African American >60 >60 mL/min/1.73 m 2    GFR If Non African American >60 >60 mL/min/1.73 m 2   EKG (NOW)   Result Value Ref Range    Report       [facility] Emergency Dept.    Test Date:  2018-04-10  Pt Name:    JENNA CASTRO                 Department: [facility]  MRN:        [id number]                      [facility]  Gender:     Female                       Technician: [id number]  :        1950                   Requested By:SHANA MYERS  Order #:    [id number]                    Reading MD:    Measurements  Intervals                                Axis  Rate:       99                           P:          40  KY:         156                          QRS:        -34  QRSD:       80                           T:          40  QT:         364  QTc:        468    Interpretive Statements  SINUS RHYTHM  PROBABLE LEFT  ATRIAL ABNORMALITY  LEFT AXIS DEVIATION  BORDERLINE LOW VOLTAGE IN FRONTAL LEADS  No previous ECG available for comparison           COURSE & MEDICAL DECISION MAKING  Patient presents after episodes of symmetric upper extremity quivering and expressive aphasia. Differential includes seizure, TIA, intracranial process etc. She is neurologically intact on evaluation.    Workup was obtained. Intracranial masses are identified as described above. Suspect most likely seizure related to these masses. I ordered Keppra intravenously. I added coagulation studies although patient is not on anticoagulants. She does take aspirin/Excedrin for headaches.    Patient will need further evaluation with regards to these intracranial masses. Likely neurosurgery and neurology consultations. She will be [facility] to [facility].    Discussed with Dr. Moulton who accepts the case.     FINAL IMPRESSION  1. Intracranial mass  2. Suspected seizure related to #1      This dictation was created using voice recognition software. The accuracy of the dictation is limited to the abilities of the software. I expect there may be some errors of grammar and possibly content. The nursing notes were reviewed and certain aspects of this information were incorporated into this note.      Electronically signed by: Ervin Dorado, 4/10/2018 10:20 AM

## 2018-04-10 NOTE — ED PROVIDER NOTES
ED Provider Note    Scribed for Jasper Moulton M.D. by Jeronimo Hoffman. 4/10/2018  11:10 AM    Primary care provider: Emilia Gavin M.D.  Means of arrival: EMS  History obtained from: patient  History limited by: none    CHIEF COMPLAINT  Chief Complaint   Patient presents with   • Other       HPI  Saadia Crook is a 67 y.o. Female with a history of cancer who presents to the Emergency Department as a transfer from [facility] for evaluation of brain mass and hemorrhage and complaining of gradually worsening headache for the last 2 months. Patient describes her headache as 6/10 severity. She reports associated near syncopal episode last night, arm numbness in the morning, ataxia. Patient states that she experienced bilateral facial twitching lasting 4 minutes last night with ataxia but did not decide to come to the ED after evaluation by EMS. Last week the patient also reports that she experienced a syncopal episode with loss of bowels. Patient states that her twitching did not resolve into today and decided to be evaluated. At [facility], she was found to have brain masses and hemorrhage and was transferred. She has a history of left breast cancer and was treated with radiation and lumpectomy in 1998. She has not been consistent with following up with her oncologist. Patient denies nausea, vomiting, neurologic symptoms at this time, notable moles on her skin.      REVIEW OF SYSTEMS  Pertinent positives include headache, near syncope, arm numbness, ataxia. Pertinent negatives include no nausea, vomiting, skin changes.  All other systems reviewed and negative.  C.    PAST MEDICAL HISTORY   has a past medical history of Cancer (CMS-HCC) (1995); Diverticulitis; Essential hypertension, benign (6/15/2016); Hiatus hernia syndrome; Indigestion; Kidney stones; and Peptic ulcer.    SURGICAL HISTORY   has a past surgical history that includes gyn surgery; other; gastroscopy with biopsy (10/11/2011);  "colonoscopy with polyp (10/11/2011); abdominal exploration; appendectomy; tonsillectomy and adenoidectomy; abdominal hysterectomy total; and lumpectomy (1978).    SOCIAL HISTORY  Social History   Substance Use Topics   • Smoking status: Current Every Day Smoker     Packs/day: 0.25     Years: 30.00     Types: Cigarettes   • Smokeless tobacco: Never Used   • Alcohol use 1.2 oz/week     2 Cans of beer per week      History   Drug Use No       FAMILY HISTORY  Family History   Problem Relation Age of Onset   • Cancer Mother      breast   • Hypertension Mother    • Stroke Father    • Heart Attack Neg Hx    • Heart Disease Neg Hx    • Heart Failure Neg Hx        CURRENT MEDICATIONS  Current Outpatient Prescriptions:   •  Cyanocobalamin (B-12 PO), Take 2 Tabs by mouth every day., Disp: , Rfl:   •  ascorbic acid (ASCORBIC ACID) 500 MG Tab, Take 500 mg by mouth every day., Disp: , Rfl:   •  escitalopram (LEXAPRO) 20 MG tablet, Take 20 mg by mouth every day., Disp: , Rfl:   •  lisinopril (PRINIVIL) 20 MG Tab, Take 20 mg by mouth every day., Disp: , Rfl:   •  pantoprazole (PROTONIX) 40 MG Tablet Delayed Response, Take 40 mg by mouth every day., Disp: , Rfl:   •  asa/apap/caffeine (EXCEDRIN) 250-250-65 MG Tab, Take 2 Tabs by mouth 2 Times a Day., Disp: , Rfl:     ALLERGIES  No Known Allergies    PHYSICAL EXAM  VITAL SIGNS: Pulse 92   Ht 1.727 m (5' 8\")   Wt 68.5 kg (151 lb)   SpO2 93%   BMI 22.96 kg/m²     Constitutional: Well developed, Well nourished, Mimld distress, Non-toxic appearance.   HENT: Normocephalic, Atraumatic, Bilateral external ears normal, Oropharynx moist, No oral exudates.   Eyes: PERRLA, EOMI, Conjunctiva normal, No discharge.   Neck: No tenderness, Supple, No stridor.   Lymphatic: No lymphadenopathy noted.   Cardiovascular: Normal heart rate, Normal rhythm.   Thorax & Lungs: Clear to auscultation bilaterally, No respiratory distress, No wheezing, No crackles.   Abdomen: Soft, No tenderness, No masses, No " pulsatile masses.   Skin: Warm, Dry, No erythema, No rash.   Extremities:, No edema No cyanosis.   Musculoskeletal: No tenderness to palpation or major deformities noted.  Intact distal pulses  Neurologic: Awake, alert. Cranial nerves 2-11 intact, muscle strength 5/5 in bilateral upper and lower extremities.  Psychiatric: Affect normal, Judgment normal, Mood normal.     LABS  Labs Reviewed   MAGNESIUM     All labs reviewed by me.    RADIOLOGY  DX-CHEST-PORTABLE (1 VIEW)   Final Result      Prominence of the right hilum corresponding to the right hilar mass seen on same day CT.      CT-CHEST,ABDOMEN,PELVIS WITH   Final Result      Right hilar mass measures 4.1 x 2.7 cm. This appears compatible with malignancy.      9 mm nodule is seen along the minor fissure on the right.      Bilateral subcentimeter pulmonary nodules.      Tiny hepatic lesion is too small to characterize.      Colonic diverticulosis.      Bilateral adrenal lesions are not significantly changed compared to prior.      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      Tiny nonobstructing left renal calculi may be present.      1 cm retroperitoneal lymph node is unchanged compared to prior.               MR-BRAIN-WITH & W/O   Final Result         1.  4 brain metastases near the gray-white junction in the frontal, left parietal, and left occipital lobes.      2.  The largest metastasis is in the right superior frontal lobe near the gray-white junction and measures 2.2 cm in greatest diameter. Further there is a moderate amount of surrounding vasogenic edema which causes mild effacement of the right lateral    ventricle.      The radiologist's interpretation of all radiological studies have been reviewed by me.    COURSE & MEDICAL DECISION MAKING  Pertinent Labs & Imaging studies reviewed. (See chart for details)    I reviewed the patient's medical records which showed was seen at [facility] for a syncopal episode and  lost control of bowels 7 days ago. She has had facial twitching today and went to be evaluated at [facility]. There, CT showed intraparenchymal masses and was transferred    11:10 AM - Patient seen and examined at bedside.I discussed her findings with the patient in detail. She will be admitted for further evaluation. Patient verbalizes understanding and agreement to this plan of care. She is instructed to remain NPO. Transferring facility noted possible seizure like activity and patient will be treated with Keppra 1000 mg in NS bolus. The differential diagnoses include but are not limited to: metastatic brain mass, seizures    11:21 AM - Paged neurology, [facility].     11:24 AM - I discussed the patient's case and the above findings with Dr. Orourke (neurology) who recommends neurosurgery be consulted. Patient will be treated with Zofran 4 mg, Sublimaze 50 mcg. Ordered MR brain, DX chest to evaluate her symptoms.     11:28 AM - Paged neurosurgery    11:30 AM - I discussed the patient's case and the above findings with [facility] who agrees to admit the patient.    11:43 AM - I discussed the patient's case and the above findings with Dr. Masterson (neurosurgery) who agrees to consult. He agrees with Keppra, Decadron administration and recommends Decadron 4 mg q6. He also agrees the patient is not an ICU candidate at this time. Ordered for CT chest abdomen pelvis to be added to evaluation.    11:55 AM - Patient reevaluated at bedside. Discussed treatment plan.      Decision Making:  Patient transferred from outside hospital secondary to new brain mass. The patient was given Keppra, Decadron, neurosurgery consultation, CT scan of the chest abdomen pelvis to find primary source which turns out to be likely the lung. MRI with and without was performed. Discussed the case with the residents for admission the hospital.    DISPOSITION:  Patient will be admitted to hospital in guarded condition.    FINAL IMPRESSION  1. Intracranial  mass          IJeronimo (Scribe), am scribing for, and in the presence of, Jasper Moulton M.D..    Electronically signed by: Jeronimo Hoffman (Scribe), 4/10/2018    Jasper BARRON M.D. personally performed the services described in this documentation, as scribed by Jeronimo Hoffman in my presence, and it is both accurate and complete.    The note accurately reflects work and decisions made by me.  Jasper Moulton  4/10/2018  5:47 PM

## 2018-04-10 NOTE — ED NOTES
Aidet done, saline lock established and blood to the lab. Plan of care discussed, pt is aware that we need to collect a UA, to CT.

## 2018-04-10 NOTE — ASSESSMENT & PLAN NOTE
Secondary to brain metastasis.    Plan  - Continue IV decadron 4mg Q8hrs x 2-3 days, then BID per neurosurgery recommendations  - Labetalol PRN for BP   - Goal SBP < 140  - Na goal 140-145 salt tablets TID, if not responding to oral salt supplements then to consider hypertonic saline

## 2018-04-10 NOTE — ED NOTES
On return from CT, this pt walked to the bathroom and a UA was collected and sent. ERP at the bedside to discuss the plan of care.

## 2018-04-10 NOTE — ED TRIAGE NOTES
Transfer from [facility] for brain mass with hemorrhage , hx of breast cancer in 1998.  Patient c/o 6/10 HA and dizziness.  AAOx4.  Dr. Moulton aware of patient arrival.

## 2018-04-10 NOTE — ASSESSMENT & PLAN NOTE
"4 metastasis in brain with right frontal lobe mass being the largest. Possibly lung vs breast cancer. Biopsy of brain met significant for \"metastatic, poorly differentiated adenocarcinoma favoring breast cancer\" Biopsy did reveal some features that favor lung as primary. After discussing with Dr. Noe (oncology), who spoke with Dr. Davis (rad onc) and pathologist, will proceed with additional histopathological studies on existing tissue. If further testing is inconclusive, may consider CT guided biopsy of hilar mass however likely to have low diagnostic yield.     S/p craniotomy April 16, 2018 PM    Plan  - Continue decadron to reduce mass effect  - Aspiration, seizure, fall precautions  - Neuro checks Q4  - Awaiting results of additional pathology studies  "

## 2018-04-10 NOTE — H&P
"      Internal Medicine Admitting History and Physical    Note Author: Tawana Long M.D.       Name Saadia Crook     1950   Age/Sex 67 y.o. female   MRN [id number]   Code Status Full Code     After 5PM or if no immediate response to page, please call for cross-coverage  Attending/Team: Dr. Bowens/Mamta See Patient List for primary contact information  Call [phone] to page    1st Call - Day Intern (R1):   Dr. Long 2nd Call - Day Sr. Resident (R2/R3):   Dr. Moreno       Chief Complaint:  Suspected brain metastasis on CT scan of brain     HPI:  67 year old female with PMHx of breast cancer (lumpectomy and radiation in ), HTN presented initially to [facility] for complaints of stumbling, unsteadiness on her feet. At [facility], CT imaging was obtained and was significant for brain mass associated with possible intracranial hemorrhage therefore transferred for higher level of care with specialists to [facility].  While transferring from [facility] to [facility], transferring service noted possible seizure like activity.     Patient notes her troubles began about 1 month ago. Since then she has been experiencing worsening headache, cramping in her left lower extremities, intermittent numbness in her left arm and hand, and a sense of being \"unbalanaced\". Last week she had an episode where she felt like she was chocking; she then fell to her knees, lost consciousness for about 1 - 2 minutes. When she regained consciousness, she noted she had hit her head and had fecal incontinence. She denies lightheadedness, post-ictal period, tongue bite. No similar episodes in the past.     Yesterday afternoon, she noted \"shaking\" of her lower lip, which then progressed to the chin, chest and arms. She was unable to speak so she wrote down a piece of paper for her  to call 911. Emergency services arrived, recommended patient seek medical attention however she declined their recommendations. She " "subsequently woke up this morning, was unsteady and dizzy. She had a near fall but caught herself on a nearby chair. Concerned for her symptoms she came to the hospital.     She denies h/o seizure disorder, stroke, MI. She states her last colonoscopy was 6 months ago; per patient did not show any suspicious lesions. She states she gets regular mammograms and has had no re-occurence since her diagnose in the 90s. No other cancer history.      Once in the ED, patient was started on Keppra 1000 mg in NS for seizures. ER physician discussed case with Dr. Orourke who recommended to consult neurosurgery. ER physician discussed with Dr. Masterson (neurosurgery) who recommended patient be started on decadron 5 mg qh along with keppra.       Review of Systems   Constitutional: Negative for chills, fever and malaise/fatigue.   HENT: Negative for congestion, hearing loss, nosebleeds and sore throat.    Eyes: Negative for blurred vision, double vision and photophobia.   Respiratory: Negative for cough, hemoptysis, sputum production and shortness of breath.    Cardiovascular: Negative for chest pain, palpitations, orthopnea, leg swelling and PND.   Gastrointestinal: Negative for abdominal pain, blood in stool, nausea and vomiting.        H/o colitis: has alternating BMs of diarrhea and constipation. Last 2 weeks she says she had normal BMs   Genitourinary: Positive for frequency. Negative for dysuria, flank pain and hematuria.        Increased urinary frequency.    Musculoskeletal: Positive for falls. Negative for back pain.        C/o left shoulder pain   Skin: Negative for itching.   Neurological: Positive for dizziness and headaches. Negative for sensory change, speech change and focal weakness.        Dizziness occurred this morning, has since resolved. Headaches worsening over the past 1 month, not associated with blurry vision or double vision. States she occas see spots. \"Shaking\" of jaw with possible seizure activity while " patient was transferred from [facility]. Received Keppra loading dose in ED. Currently no evidence of seizure activity at this time.    Endo/Heme/Allergies: Does not bruise/bleed easily.   Psychiatric/Behavioral: Positive for depression. Negative for memory loss and suicidal ideas. The patient does not have insomnia.         Patient has been overwhelmed as her  suffers from major medical issues and she is his sole provider. She was started on escitalopram for this reason with some effectiveness             Past Medical History:   Past Medical History:   Diagnosis Date   • Cancer (CMS-HCC) 1995    breast ca   • Diverticulitis    • Essential hypertension, benign 6/15/2016   • Hiatus hernia syndrome    • Indigestion    • Kidney stones    • Peptic ulcer     small antral ulcer found on EGD 2011       Past Surgical History:  Past Surgical History:   Procedure Laterality Date   • GASTROSCOPY WITH BIOPSY  10/11/2011    Performed by TASHIA CRAIG at ENDOSCOPY [facility]   • COLONOSCOPY WITH POLYP  10/11/2011    Performed by TASHIA CRAIG at ENDOSCOPY [facility]   • LUMPECTOMY  1978    right   • ABDOMINAL EXPLORATION     • ABDOMINAL HYSTERECTOMY TOTAL      BSO   • APPENDECTOMY     • GYN SURGERY      hysterectomy   • OTHER      tonsilectomy   • TONSILLECTOMY AND ADENOIDECTOMY         Current Outpatient Medications:  Home Medications     Reviewed by Marshall Mcmullen (Pharmacy Tech) on 04/10/18 at 1130  Med List Status: Complete   Medication Last Dose Status   asa/apap/caffeine (EXCEDRIN) 250-250-65 MG Tab 4/10/2018 Active   ascorbic acid (ASCORBIC ACID) 500 MG Tab 4/9/2018 Active   Cyanocobalamin (B-12 PO) 4/9/2018 Active   escitalopram (LEXAPRO) 20 MG tablet 4/9/2018 Active   lisinopril (PRINIVIL) 20 MG Tab 4/9/2018 Active   pantoprazole (PROTONIX) 40 MG Tablet Delayed Response 4/10/2018 Active                Medication Allergy/Sensitivities:  No Known Allergies      Family History:  - Notes father  "is alive; almost 102 years old  - Mother: breast cancer  - Sisters are alive and healthy      Family History   Problem Relation Age of Onset   • Cancer Mother      breast   • Hypertension Mother    • Stroke Father    • Heart Attack Neg Hx    • Heart Disease Neg Hx    • Heart Failure Neg Hx        Social History:  Social History     Social History   • Marital status:      Spouse name: N/A   • Number of children: N/A   • Years of education: N/A     Occupational History   • Not on file.     Social History Main Topics   • Smoking status: Current Every Day Smoker     Packs/day: 0.25     Years: 30.00     Types: Cigarettes   • Smokeless tobacco: Never Used   • Alcohol use 1.2 oz/week     2 Cans of beer per week   • Drug use: No   • Sexual activity: No     Other Topics Concern   • Not on file     Social History Narrative   • No narrative on file     Living situation: Lives with   Medical decisions to be made by her sister if she were in a situation where she could not make decisions for herself.       Physical Exam     Vitals:    04/10/18 1430 04/10/18 1500 04/10/18 1600 04/10/18 1644   BP: 147/80   119/77   Pulse: 88 79 79 92   Resp: 14 15 14 16   Temp:    37.3 °C (99.2 °F)   SpO2: 96% 95% 95% 91%   Weight:       Height:         Body mass index is 22.96 kg/m².  /77   Pulse 92   Temp 37.3 °C (99.2 °F)   Resp 16   Ht 1.727 m (5' 8\")   Wt 68.5 kg (151 lb)   SpO2 91%   BMI 22.96 kg/m²   O2 therapy: Pulse Oximetry: 91 %, O2 Delivery: None (Room Air)    Physical Exam   Constitutional: She is oriented to person, place, and time and well-developed, well-nourished, and in no distress. No distress.   HENT:   Head: Normocephalic and atraumatic.   Mouth/Throat: Oropharynx is clear and moist. No oropharyngeal exudate.   Eyes: Conjunctivae and EOM are normal. Pupils are equal, round, and reactive to light. No scleral icterus.   Neck: Normal range of motion. Neck supple.   Cardiovascular: Normal rate, regular " rhythm, normal heart sounds and intact distal pulses.    No murmur heard.  Pulmonary/Chest: Effort normal and breath sounds normal. No respiratory distress. She has no wheezes. She has no rales.   Abdominal: Soft. Bowel sounds are normal. She exhibits no distension. There is no tenderness. There is no rebound and no guarding.   Musculoskeletal: Normal range of motion. She exhibits no edema or tenderness.   Neurological: She is alert and oriented to person, place, and time. No cranial nerve deficit.   Strength 5/5 in upper and lower extremities. Sensation intact throughout. Speech is at baseline.    Skin: Skin is warm and dry. She is not diaphoretic.   Psychiatric: Mood, memory, affect and judgment normal.             Data Review       Old Records Request:   Deferred  Current Records review and summary: Completed    Lab Data Review:  Recent Results (from the past 24 hour(s))   EKG (NOW)    Collection Time: 04/10/18  9:09 AM   Result Value Ref Range    Report       [facility] Emergency Dept.    Test Date:  2018-04-10  Pt Name:    JENNA CASTRO                 Department: [facility]  MRN:        [id number]                      Room:       [place] [address]  Gender:     Female                       Technician: [id number]  :        1950                   Requested By:SHANA MYERS  Order #:    [id number]                    Reading MD: SHANA MYERS MD    Measurements  Intervals                                Axis  Rate:       99                           P:          40  ME:         156                          QRS:        -34  QRSD:       80                           T:          40  QT:         364  QTc:        468    Interpretive Statements  SINUS RHYTHM  PROBABLE LEFT ATRIAL ABNORMALITY  LEFT AXIS DEVIATION  BORDERLINE LOW VOLTAGE IN FRONTAL LEADS  No previous ECG available for comparison    Electronically Signed On 4- 10:23:00 PDT by SHANA MYERS MD     CBC WITH DIFFERENTIAL    Collection  Time: 04/10/18  9:15 AM   Result Value Ref Range    WBC 9.3 4.8 - 10.8 K/uL    RBC 4.19 (L) 4.20 - 5.40 M/uL    Hemoglobin 14.6 12.0 - 16.0 g/dL    Hematocrit 40.4 37.0 - 47.0 %    MCV 96.4 81.4 - 97.8 fL    MCH 34.8 (H) 27.0 - 33.0 pg    MCHC 36.1 (H) 33.6 - 35.0 g/dL    RDW 45.8 35.9 - 50.0 fL    Platelet Count 400 164 - 446 K/uL    MPV 8.9 (L) 9.0 - 12.9 fL    Neutrophils-Polys 56.80 44.00 - 72.00 %    Lymphocytes 28.60 22.00 - 41.00 %    Monocytes 11.60 0.00 - 13.40 %    Eosinophils 2.10 0.00 - 6.90 %    Basophils 0.60 0.00 - 1.80 %    Immature Granulocytes 0.30 0.00 - 0.90 %    Nucleated RBC 0.00 /100 WBC    Neutrophils (Absolute) 5.26 2.00 - 7.15 K/uL    Lymphs (Absolute) 2.65 1.00 - 4.80 K/uL    Monos (Absolute) 1.07 (H) 0.00 - 0.85 K/uL    Eos (Absolute) 0.19 0.00 - 0.51 K/uL    Baso (Absolute) 0.06 0.00 - 0.12 K/uL    Immature Granulocytes (abs) 0.03 0.00 - 0.11 K/uL    NRBC (Absolute) 0.00 K/uL   COMP METABOLIC PANEL    Collection Time: 04/10/18  9:15 AM   Result Value Ref Range    Sodium 131 (L) 135 - 145 mmol/L    Potassium 4.1 3.6 - 5.5 mmol/L    Chloride 98 96 - 112 mmol/L    Co2 22 20 - 33 mmol/L    Anion Gap 11.0 0.0 - 11.9    Glucose 98 65 - 99 mg/dL    Bun 14 8 - 22 mg/dL    Creatinine 0.88 0.50 - 1.40 mg/dL    Calcium 9.1 8.4 - 10.2 mg/dL    AST(SGOT) 22 12 - 45 U/L    ALT(SGPT) 18 2 - 50 U/L    Alkaline Phosphatase 89 30 - 99 U/L    Total Bilirubin 0.4 0.1 - 1.5 mg/dL    Albumin 4.3 3.2 - 4.9 g/dL    Total Protein 7.5 6.0 - 8.2 g/dL    Globulin 3.2 1.9 - 3.5 g/dL    A-G Ratio 1.3 g/dL   TROPONIN    Collection Time: 04/10/18  9:15 AM   Result Value Ref Range    Troponin I 0.04 0.00 - 0.04 ng/mL   ESTIMATED GFR    Collection Time: 04/10/18  9:15 AM   Result Value Ref Range    GFR If African American >60 >60 mL/min/1.73 m 2    GFR If Non African American >60 >60 mL/min/1.73 m 2   APTT    Collection Time: 04/10/18  9:15 AM   Result Value Ref Range    APTT 34.5 24.7 - 36.0 sec   PT/INR    Collection  Time: 04/10/18  9:15 AM   Result Value Ref Range    PT 12.1 12.0 - 14.6 sec    INR 0.90 0.87 - 1.13   URINALYSIS CULTURE, IF INDICATED    Collection Time: 04/10/18  9:50 AM   Result Value Ref Range    Micro Urine Req see below     Culture Indicated No UA Culture    Color Yellow     Character Clear     Specific Gravity 1.020 <1.035    Ph 6.0 5.0 - 8.0    Glucose Negative Negative mg/dL    Ketones 15 (A) Negative mg/dL    Protein Negative Negative mg/dL    Bilirubin Small (A) Negative    Nitrite Negative Negative    Leukocyte Esterase Negative Negative    Occult Blood Negative Negative       Imaging/Procedures Review:    ndependant Imaging Review: Completed  DX-CHEST-PORTABLE (1 VIEW)   Final Result      Prominence of the right hilum corresponding to the right hilar mass seen on same day CT.      CT-CHEST,ABDOMEN,PELVIS WITH   Final Result      Right hilar mass measures 4.1 x 2.7 cm. This appears compatible with malignancy.      9 mm nodule is seen along the minor fissure on the right.      Bilateral subcentimeter pulmonary nodules.      Tiny hepatic lesion is too small to characterize.      Colonic diverticulosis.      Bilateral adrenal lesions are not significantly changed compared to prior.      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      Tiny nonobstructing left renal calculi may be present.      1 cm retroperitoneal lymph node is unchanged compared to prior.               MR-BRAIN-WITH & W/O   Final Result         1.  4 brain metastases near the gray-white junction in the frontal, left parietal, and left occipital lobes.      2.  The largest metastasis is in the right superior frontal lobe near the gray-white junction and measures 2.2 cm in greatest diameter. Further there is a moderate amount of surrounding vasogenic edema which causes mild effacement of the right lateral    ventricle.           EKG:   EKG Independant Review: Completed  QTc: 468, HR: 99, Normal Sinus Rhythm,  "no ST/T changes     Records reviewed and summarized in current documentation             Assessment/Plan     Intracranial edema (CMS-HCC)- (present on admission)   Assessment & Plan    CT brain report: \"right frontal mass is somewhat high in density consistent with a component of hemorrhage\". ER physician spoke to neurosurgery (Dr. Masterson). Recommended decadron.     MRI brain was also obtained: significant for \"4 brain metastasis near gray-white junction in frontal, left parietal and left occipital lobes\" along with \"vasogenic edema which is causing mild effacement of the right lateral ventricle\". MRI reported \"no hemorrhagic lesions\"    Plan  - Aspiration, seizure, fall precautions  - IV 4 mg decadron q6h per neurosurgery recommendations  - Q4 Neurochecks  - Labetalol PRN for BP > 160/90 (labetalol PRN)  - Strict NPO at midnight for bronchoscopy in AM  - Holding DVT prophylaxis  For AM bronchoscopy; SCDs for now  - PT/OT to evaluate patient for needs  - Continue Keppra 500 mg BID (recieved loading dose in ED)  - Neurosurgery (Dr. Masterson) following          Seizure (CMS-HCC)- (present on admission)   Assessment & Plan    No previous history of seizures. While patient was being transported to [facility] from [facility], patient apparently had some seizure activity in addition to possible seizure occurring yesterday and last week. Likely secondary to metastatic lesions in brain    Plan  - Keppra 500 mg BID  - q4 neuro checks  - Aspiration, seizure, fall precautions          Malignancy (CMS-HCC)   Assessment & Plan    H/o Breast cancer in the past, treated with lumpectomy and radiation. Per patient, she is up-to-date on cancer screening. ED provider ordered CT imaging of chest, abdomen and pelvis to evaluate for primary as CT brain and MRI were significant for metastatic disease.      CT chest abdomen pelvis: \"right hilar mass measures 4.1 cm x 2.7 cm which appears to be malignant. No left lymphadenopathy. Patient has h/o " 1ppd x 20 years smoking history. Suspecting lung is primary malignancy.  However given h/o breast cancer, this also needs to be taken into consideration    Plan  - Tissue diagnosis via lung or brain. Discussed with Dr. Masterson, better to get biopsy from bronchoscopy with pulmonology.  Spoke with Dr. Crandall, plan for AM bronchoscopy. Strict NPO at midnight. Hold anticoagulation for DVT prophyalxis   - SCDs for DVT prophylaxis  - Will need oncology services for treatment, if biopsy confirms cancer  - Team discussed with patient: she would like to pursue aggressive treatment.  - Dr. Masterson (neurosurgery) following          Adrenal nodule (CMS-HCC)   Assessment & Plan    CT was significant for bilateral adrenal nodules that are not significantly changed compared to prior imaging    Plan  - Continue to monitor        Angiolipoma of right kidney   Assessment & Plan    CT was significant for lesion in the right kidney measuring 1.2 cm containing intralesional fat representing angiomyolipoma.     Plan  - Continue to monitor        Lung nodule   Assessment & Plan    CT performed to identify primary source of malignancy was significant for: 4.5 mm nodule along major fissure on left and 3.5 mm left upper lobe nodule. Subpleural right upper lobe pulmonary nodule up to 3 mm. Subpleural nodule along right major fissure about 4 mm. There is a nodule along minor fissure measuring 9 mm.  These nodules were also found in the setting of right hilar mass    Plan  - Discussed with Pulmonology: Plan for bronchoscopy in AM for right hilar mass. NPO at midnight. Holding pharmacologic DVT prophylaxis. SCDs for DVT prophylaxis.             Diverticulosis   Assessment & Plan    H/o colitis, diverticulitis. Per patient last colonoscopy was 6 months ago and was significant for her usual colonic pathologies. CT significant for diverticulosis. Currently stable    Plan  - Continue to monitor        Essential hypertension- (present on admission)    Assessment & Plan    On lisinopril as outpatient. BP was high on admission > 180/90; has since normalized    Plan  - Control blood pressure; have started patient on her outpatient lisinopril  - Labetalol PRN for pressures > 160/90              Anticipated Hospital stay:  >2 midnights        Quality Measures  Quality-Core Measures   Reviewed items::  EKG reviewed, Medications reviewed, Labs reviewed and Radiology images reviewed  Rushing catheter::  No Rushing  DVT prophylaxis pharmacological::  Contraindicated - High bleeding risk (Plan for AM bronchoscopy therefore holding pharmacologic DVT prophylaxis)  DVT prophylaxis - mechanical:  SCDs

## 2018-04-10 NOTE — ASSESSMENT & PLAN NOTE
Identified on CT of the thorax while evaluating for primary of brain metastasis.  Bronchoscopy was done on 4/11, biopsy negative for malignancy; cytology was negative for malignancy. Biopsy was significant for reactive changes/reactive atypia.  S/p craniotomy 4/16/2018     Plan  - Holding of CT-guided biopsy of hilar mass until additional pathological tests have been performed on the brain met. Per Dr. Wilcox's conversation, the tissue from brain met is highly suggestive of breast cancer however additional information will help clarify source

## 2018-04-10 NOTE — ED NOTES
Med rec updated and complete  Allergchang reviewed  Pt ws ok if I discussed her medications in front of her sister.  Pt had a list of medications at bedside, went over list and returned pt medications list back to pt at bedside.  Pt reports no antibiotics in the last 30 days.

## 2018-04-10 NOTE — NON-PROVIDER
"      Internal Medicine Medical Student Admitting History and Physical  Note Author: Antonio Vides, Student    Name Saadia Crook     1950   Age/Sex 67 y.o. female   MRN [id number]   Code Status Full     After 5PM or if no immediate response to page, please call for cross-coverage  Attending/Team: Kwan See Patient List for primary contact information  Call [phone] to page after hours   1st Call - Day Intern (R1):   Mercedes 2nd Call - Day Sr. Resident (R2/R3):   Tiffanie       Chief Complaint:    Dizziness and Tremors  Intracranial mass      HPI:    The patient is a 67 year old female who presents to the emergency department as a transfer from [facility] where she initially presented this morning complaining of dizziness and tremors.  The patient reports a history of intermittent headaches which have been increasing in frequency and severity over the last two months.  She also reports occasionally waking with left arm numbness and weakness over the last few months.  One week ago the patient suddenly became very lightheaded and experienced an episode of syncope which was unwitnessed.  She states that she remembers falling but struck her forehead on a chair which she believes knocked her unconscious briefly.  Upon regaining consciousness she realized that she had been incontinent of stool.  Yesterday the patient states that she awoke feeling \"in a fog\" and \"not myself\", and while trying to do her make-up she began to experience trembling of her lower lip and jaw which progressed to shaking of her upper extremities and torso which lasted about 4 minutes.  The patient states that she was unable to speak during the event but was able to instruct her  to contact EMS by writing on a paper.  The patient's symptoms had resolved upon EMS arrival and she elected not to come to the emergency department but rather to follow up with her PCP.  This morning the patient felt lightheaded and " dizzy and decided to present to the emergency department for further evaluation.  She denies additional focal weakness or numbness, speech changes, visual changes, nausea, vomiting, chest pain or shortness of breath.  The patient has a history significant for breast cancer and is status post lumpectomy in 1998 followed by 6 weeks of radiation therapy.  She has had regular follow up with her oncologist and repeated mammograms have given no evidence of recurrence.        In the ED at [facility] a CT head was performed which indicated two intracranial masses concerning for metastasis as well as evidence of possible hemorrhagic conversion surround one of the masses.  The patient was sent to [facility] for further evaluation and management.  In the ED here the patient received an MR of the brain which indicated four distinct masses concerning for metastasis in the frontal, left parietal and left occipital lobes, with moderate surrounding vasogenic edema but no evidence of hemorrhage.  A CT Chest/Abd was also performed which indicated a large right hilar mass compatible with malignancy and evidence of additional lung nodules as well.  The patient was admitted for neurosurgical evaluation and continued management.          Review of Systems   Constitutional: Negative for fever and malaise/fatigue.   HENT: Negative for hearing loss.    Eyes: Negative for blurred vision and double vision.   Respiratory: Negative for cough, hemoptysis and shortness of breath.    Cardiovascular: Negative for chest pain, palpitations and leg swelling.   Gastrointestinal: Negative for abdominal pain, blood in stool, constipation, diarrhea, nausea and vomiting.   Genitourinary: Negative for dysuria and hematuria.   Musculoskeletal: Negative for myalgias.   Skin: Positive for rash (feet bilaterally).   Neurological: Positive for dizziness, tingling, tremors, sensory change, speech change, focal weakness, seizures and headaches.           Past Medical  "History  And current medications (link outpatient medications  with diagnosis)    Breast cancer 1998 s/p lumpectomy and radiation therapy x 6 weeks with no recurrence to date.   Hypertension treated with lisinopril 20 mg qd  Anxiety treated with lexapro 20 mg qd  Hiatal hernia treated with pantoprazole 40 mg qd  Colitis  Diverticulosis     Past Surgical History:  Past Surgical History:   Procedure Laterality Date   • GASTROSCOPY WITH BIOPSY  10/11/2011    Performed by TASHIA CRAIG at ENDOSCOPY [facility] ORS   • COLONOSCOPY WITH POLYP  10/11/2011    Performed by TASHIA CRAIG at ENDOSCOPY [facility] ORS   • LUMPECTOMY  1978    right   • ABDOMINAL EXPLORATION     • ABDOMINAL HYSTERECTOMY TOTAL      BSO   • APPENDECTOMY     • GYN SURGERY      hysterectomy   • OTHER      tonsilectomy   • TONSILLECTOMY AND ADENOIDECTOMY           Medication Allergy/Sensitivities:  No Known Allergies      Family History:  Mother with history of breast cancer and hypertension  Father with history of CVA    Social History:  Smoking: Current everyday smoker- 5 cigarettes per day.  Patient is trying to cut down.  30 pack year history.   Alcohol: 2 cans of beer / week  Illictis: None  Living situation: Patient lives with her disabled  who suffers from uncontrolled DM and is s/p limb amputations and two prior CVAs.  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  The patient has children who live in town.       Physical Exam     Vitals:    04/10/18 1430 04/10/18 1500 04/10/18 1600 04/10/18 1644   BP: 147/80   119/77   Pulse: 88 79 79 92   Resp: 14 15 14 16   Temp:    37.3 °C (99.2 °F)   SpO2: 96% 95% 95% 91%   Weight:       Height:         Body mass index is 22.96 kg/m².  /77   Pulse 92   Temp 37.3 °C (99.2 °F)   Resp 16   Ht 1.727 m (5' 8\")   Wt 68.5 kg (151 lb)   SpO2 91%   BMI 22.96 kg/m²   O2 therapy: Pulse Oximetry: 91 %, O2 Delivery: None (Room Air)    Physical Exam   Constitutional: " She is oriented to person, place, and time. She appears distressed (mild).   HENT:   Head: Normocephalic and atraumatic.   Mouth/Throat: Oropharynx is clear and moist.   Eyes: Conjunctivae and EOM are normal. Pupils are equal, round, and reactive to light. No scleral icterus.   Neck: Normal range of motion. No thyromegaly present.   Cardiovascular: Normal rate, regular rhythm and intact distal pulses.  Exam reveals no gallop and no friction rub.    No murmur heard.  Pulmonary/Chest: Effort normal and breath sounds normal. No respiratory distress. She has no wheezes. She has no rales. She exhibits no tenderness.   Abdominal: Soft. Bowel sounds are normal. She exhibits no distension and no mass. There is no tenderness. There is no rebound and no guarding.   Musculoskeletal: Normal range of motion. She exhibits no edema or tenderness.   Lymphadenopathy:     She has no cervical adenopathy.   Neurological: She is alert and oriented to person, place, and time. She has normal reflexes. She displays no tremor, normal speech and normal reflexes. No cranial nerve deficit. She exhibits normal muscle tone. She shows no pronator drift. Coordination normal. GCS score is 15.   Skin: Skin is warm. Rash (scattered pinpoint eruptions to the feet bilaterally) noted.       Assessment/Plan     # Malignancy  - Patient with extensive neurologic symptoms for the last 2 months including dizziness, syncope, sensory changes, and possible seizures.  History of breast cancer treated with remission in 1998.  30 pack year smoking history.   - MR brain: 4 brain metastases near the gray-white junction in the frontal, left parietal, and left occipital lobes and moderate amount of surrounding vasogenic edema which causes mild effacement of the right lateral ventricle.  No evidence of hemorrhagic conversion on MR.    - CT Chest: Right hilar mass measures 4.1 x 2.7 cm. This appears compatible with malignancy.  Scattered additional pulmonary nodules  visualized.   - CBC, CMP and Coags WNL.   - Likely primary lung tumor with metastasis to brain given patient's smoking history and imaging findings.  Possibly breast cancer recurrence vs melanoma.    - Neurosurgery consulted on the patient.  Recommend obtaining a lung mass biopsy.  Will then consider the patient for surgical intervention.   - Pulmonology consulted.  Will perform bronchoscopy and biopsy in AM.     Plan:   - Awaiting biopsy results before consulting oncology.    - Decadron 4 mg q 6 IV   - Maintain blood pressure < 160/90 due to presence of intracranial vasogenic edema.  Labetalol PRN.    - Tylenol and Morphine PRN for pain control.   - CBC, CMP and Mg tomorrow    # Seizure  - Patient with history suspicious for new onset seizures in the setting of an intracranial mass.    - No seizures witnessed since initial presentation.     Plan:   - Keppra 500 mg q 12 for prophylaxis.    - Seizure, fall and aspiration precautions.     # Hypertension  - Patient with history of essential hypertension.    - Normotensive since initial presentation to this facility.     Plan:   - Continue outpatient lisinopril 20 mg PO qd    # Anxiety  - History of unspecified anxiety disorder, likely contributed to by serving as sole caretaker of her .     Plan:   - Continue outpatient escitalopram 20 mg qd    # Hiatal hernia  - History of such with resultant GERD.  - Patient followed regularly by GI    Plan:   - Omeprazole 20 mg qd.

## 2018-04-10 NOTE — ED NOTES
"Chief Complaint   Patient presents with   • Shaking     yesterday c/o \"uncontrolled shaking yesterday\" started in chin and moved across face per pt   • Dizziness     started this morning, pt states started to fall into chair   • Neck Pain     started two weeks ago   • Shoulder Pain     Left       "

## 2018-04-10 NOTE — ASSESSMENT & PLAN NOTE
Currently controlled. On lisinopril 20 mg as outpatient however due to increasing K+, was discontinued while inpatient. SBP goal < 140    Plan  - Continue amlodipine

## 2018-04-11 NOTE — CONSULTS
Saadia Crook is a 67-year-old female with a history of breast cancer. Unfortunately the patient presents with dizziness and a near syncopal event. The head CT showed 2 masses in the brain. I recommended a MRI of the brain with and without contrast which had already been ordered. I recommended a CAT scan of the chest and pelvis for staging. The brain MRI shows 4 masses. The largest mass is in the right frontal lobe. His measures approximately 2-3 cm. There is also a right hilar mass.    On examination, the patient is awake alert and oriented ×3, cranial nerves II-12 are grossly intact, motor strength is 5 out of 5, sensation is grossly intact to light touch, pinprick, and proprioception. Cerebellar exam is normal. Gait was not tested.     Assessment and plan    Probable breast cancer metastases to the brain and lung.    I discussed the plan of care with the primary team. Apparently the right hilar mass is amenable to bronchoscopic biopsy. I believe that needs to be performed. I would also like to discuss with our radiation colleagues to consider whole brain radiation versus SRS and possible right frontal craniotomy and extirpation of the right frontal mass. The other masses are small and do not need surgery. I will be happy to follow along with this unfortunate female. Thank you.

## 2018-04-11 NOTE — CONSULTS
Pulmonary Consultation       Patient ID:   Name:             Saadia Crook   YOB: 1950  Age:                 67 y.o.  female   MRN:               [id number]                                                  Reason of Consult:  Right suprahilar lesion affecting right upper lobe anterior segment on the CT scan. For possible bronchoscopy    Requesting physician:  Dr. Sonny Jett    History of Present Illness:  This patient is 67 years old female admitted to the emergency room today with history of head being lightheadedness and seizures. She started having episodes of lightheadedness and some dizziness with episodes of syncope and transient loss of consciousness approximately a week ago at one time this was associated with bowel incontinence. She also had episodes where she had shakes  from her upper extremity lasting for few minutes.  Workup in the emergency room included CT scan of the brain showing  masses in the frontal parietal area with some hemorrhagic conversion of a possible mass. A CT of the abdomen and chest revealed that she has  mass at the right hilar area affecting the anterior segment of the right upper lobe. For this reason a pulmonary consultation was requested for possible bronchoscopy.  ROS:  Constitutional ROS: No unexpected change in weight, No unexplained fevers, sweats, or chills  Eye ROS: No recent significant change in vision  Ear ROS: No ear pain, No drainage  Mouth/Throat ROS: No teeth or gum problems, No sore throat, No recent change in voice or hoarseness  Neck ROS: No lumps or masses, No swollen glands, No recent swelling in thyroid area  Pulmonary ROS: No chronic cough, sputum, or hemoptysis, No dyspnea on exertion, No wheezing  Cardiovascular ROS: No exercise intolerance, No chest pain, No dyspnea on exertion  Gastrointestinal ROS: No nausea, vomiting, diarrhea, or constipation  Breast ROS: Positive for mastectomy  Musculoskeletal/Extremities ROS: No clubbing,  No peripheral edema, No pain, redness or swelling on the joints  Hematologic/Lymphatic ROS: No coagulation disorder, No abnormal bleeding, No night sweats, No swollen nodes  Skin/Integumentary ROS: color, texture and temperature normal, No evidence of bleeding or bruising  Neurologic ROS: HAs headaches,  seizures,  Weakness and syncope  Psychiatric ROS: No depression, No anxiety, No psychosis    Past Medical History:  Past Medical History:   Diagnosis Date   • Cancer (CMS-HCC) 1995    breast ca   • Diverticulitis    • Essential hypertension, benign 6/15/2016   • Hiatus hernia syndrome    • Indigestion    • Kidney stones    • Peptic ulcer     small antral ulcer found on EGD 2011       Past surgical history:  Past Surgical History:   Procedure Laterality Date   • GASTROSCOPY WITH BIOPSY  10/11/2011    Performed by TASHIA CRAIG at ENDOSCOPY [facility] ORS   • COLONOSCOPY WITH POLYP  10/11/2011    Performed by TASHIA CRAIG at ENDOSCOPY [facility] ORS   • LUMPECTOMY  1978    right   • ABDOMINAL EXPLORATION     • ABDOMINAL HYSTERECTOMY TOTAL      BSO   • APPENDECTOMY     • GYN SURGERY      hysterectomy   • OTHER      tonsilectomy   • TONSILLECTOMY AND ADENOIDECTOMY         Family History:  Family History   Problem Relation Age of Onset   • Cancer Mother      breast   • Hypertension Mother    • Stroke Father    • Heart Attack Neg Hx    • Heart Disease Neg Hx    • Heart Failure Neg Hx        Hospital Medications:    Current Facility-Administered Medications:   •  senna-docusate (PERICOLACE or SENOKOT S) 8.6-50 MG per tablet 2 Tab, 2 Tab, Oral, BID **AND** polyethylene glycol/lytes (MIRALAX) PACKET 1 Packet, 1 Packet, Oral, QDAY PRN **AND** magnesium hydroxide (MILK OF MAGNESIA) suspension 30 mL, 30 mL, Oral, QDAY PRN **AND** bisacodyl (DULCOLAX) suppository 10 mg, 10 mg, Rectal, QDAY PRN, Tawana Long M.D.  •  INITIATE NICOTINE REPLACEMENT PROTOCOL , , , Once **AND** nicotine (NICODERM) 14 MG/24HR 14 mg, 14 mg,  Transdermal, Daily-0600, 14 mg at 04/10/18 1454 **AND** Protocol 205 PATIENT EDUCATION MATERIALS, , , Once **AND** Protocol 205 Rotate nicotine patch application sites daily , , , CONTINUOUS **AND** nicotine polacrilex (NICORETTE) 2 MG piece 2 mg, 2 mg, Oral, Q HOUR PRN, Tawana Long M.D.  •  acetaminophen (TYLENOL) tablet 650 mg, 650 mg, Oral, Q6HRS PRN, Tawana Long M.D.  •  dexamethasone (DECADRON) injection 4 mg, 4 mg, Intravenous, Q6HRS, Tawana Long M.D., Stopped at 04/10/18 1245  •  levETIRAcetam (KEPPRA) 500 mg in  mL IVPB, 500 mg, Intravenous, Q12HRS, Tawana Long M.D.  •  ondansetron (ZOFRAN) syringe/vial injection 4 mg, 4 mg, Intravenous, Q4HRS PRN, Tawana Long M.D., 4 mg at 04/10/18 1453  •  labetalol (NORMODYNE,TRANDATE) injection 10 mg, 10 mg, Intravenous, Q4HRS PRN, Tawana Long M.D.  •  escitalopram (LEXAPRO) tablet 20 mg, 20 mg, Oral, DAILY, Tawana Long M.D., Stopped at 04/10/18 1300  •  lisinopril (PRINIVIL) tablet 20 mg, 20 mg, Oral, DAILY, Tawana Long M.D., Stopped at 04/10/18 1300  •  [START ON 4/11/2018] omeprazole (PRILOSEC) capsule 20 mg, 20 mg, Oral, DAILY, Tawana Long M.D.  •  morphine (pf) 4 mg/ml injection 2 mg, 2 mg, Intravenous, Q3HRS PRN, Sonny Moreno M.D., 2 mg at 04/10/18 1654    Current Outpatient Medications:  Prescriptions Prior to Admission   Medication Sig Dispense Refill Last Dose   • Cyanocobalamin (B-12 PO) Take 2 Tabs by mouth every day.   4/9/2018 at 0600   • ascorbic acid (ASCORBIC ACID) 500 MG Tab Take 500 mg by mouth every day.   4/9/2018 at 0600   • escitalopram (LEXAPRO) 20 MG tablet Take 20 mg by mouth every day.   4/9/2018 at 0600   • lisinopril (PRINIVIL) 20 MG Tab Take 20 mg by mouth every day.   4/9/2018 at 1900   • pantoprazole (PROTONIX) 40 MG Tablet Delayed Response Take 40 mg by mouth every day.   4/10/2018 at 0900   • asa/apap/caffeine (EXCEDRIN) 250-250-65 MG Tab Take 2 Tabs by mouth 2 Times a Day.   4/10/2018 at 0900  "      Medication Allergy:  No Known Allergies          Objective:   Vitals/ General Appearance:   Weight/BMI: Body mass index is 22.96 kg/m².  Blood pressure 119/77, pulse 92, temperature 37.3 °C (99.2 °F), resp. rate 16, height 1.727 m (5' 8\"), weight 68.5 kg (151 lb), SpO2 91 %.  Vitals:    04/10/18 1430 04/10/18 1500 04/10/18 1600 04/10/18 1644   BP: 147/80   119/77   Pulse: 88 79 79 92   Resp: 14 15 14 16   Temp:    37.3 °C (99.2 °F)   SpO2: 96% 95% 95% 91%   Weight:       Height:         Oxygen Therapy:  Pulse Oximetry: 91 %, O2 Delivery: None (Room Air)    Constitutional:   Well developed, Well nourished, No acute distress  HENMT:  Normocephalic, Atraumatic, Oropharynx moist mucous membranes, No oral exudates, Nose normal.  No thyromegaly.  Eyes:  EOMI, Conjunctiva normal, No discharge.  Neck:  Normal range of motion, No cervical tenderness,  no JVD.  Cardiovascular:  Normal heart rate, Normal rhythm, No murmurs, No rubs, No gallops.   Extremitites with intact distal pulses, no cyanosis, or edema.  Lungs:  Normal breath sounds, breath sounds clear to auscultation bilaterally,  no crackles, no wheezing.   Abdomen: Bowel sounds normal, Soft, No tenderness, No guarding, No rebound, No masses, No hepatosplenomegaly.  Skin: Warm, Dry, No erythema, No rash, no induration.  Neurologic: Alert & oriented x 3, No focal deficits noted, cranial nerves II through X are grossly intact.  Psychiatric: Affect normal, Judgment normal, Mood normal.    Labs:  Recent Labs      04/10/18   0915   WBC  9.3   RBC  4.19*   HEMOGLOBIN  14.6   HEMATOCRIT  40.4   MCV  96.4   MCH  34.8*   MCHC  36.1*   RDW  45.8   PLATELETCT  400   MPV  8.9*     Recent Labs      04/10/18   0915   APTT  34.5   INR  0.90         Recent Labs      04/10/18   0915   TROPONINI  0.04     Recent Labs      04/10/18   0915   SODIUM  131*   POTASSIUM  4.1   CHLORIDE  98   CO2  22   GLUCOSE  98   BUN  14     Recent Labs      04/10/18   0915   SODIUM  131*   POTASSIUM  " 4.1   CHLORIDE  98   CO2  22   BUN  14   CREATININE  0.88   CALCIUM  9.1     Results for orders placed or performed during the hospital encounter of 06/06/16   Echocardiogram Comp w/o Cont   Result Value Ref Range    Left Ventrical Ejection Fraction 75          Imaging:   DX-CHEST-PORTABLE (1 VIEW)   Final Result      Prominence of the right hilum corresponding to the right hilar mass seen on same day CT.      CT-CHEST,ABDOMEN,PELVIS WITH   Final Result      Right hilar mass measures 4.1 x 2.7 cm. This appears compatible with malignancy.      9 mm nodule is seen along the minor fissure on the right.      Bilateral subcentimeter pulmonary nodules.      Tiny hepatic lesion is too small to characterize.      Colonic diverticulosis.      Bilateral adrenal lesions are not significantly changed compared to prior.      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      Tiny nonobstructing left renal calculi may be present.      1 cm retroperitoneal lymph node is unchanged compared to prior.               MR-BRAIN-WITH & W/O   Final Result         1.  4 brain metastases near the gray-white junction in the frontal, left parietal, and left occipital lobes.      2.  The largest metastasis is in the right superior frontal lobe near the gray-white junction and measures 2.2 cm in greatest diameter. Further there is a moderate amount of surrounding vasogenic edema which causes mild effacement of the right lateral    ventricle.              Assessment and Plan:  Active Problems:    Intracranial edema (CMS-HCC) POA: Yes; possibly metastatic  Patient is started on Decadron intravenously      Malignancy (CMS-HCC) POA: Unknown      Seizure (CMS-HCC) POA: Yes, from Brain Mets  Given a loading dose of Keppra and currently on maintenance      Essential hypertension (Chronic) POA: Yes    Diverticulosis (Chronic) POA: Unknown      Lung nodule vs right hilar mass affecting anterior right upper lobe with  possible compression of the bronchus POA: Yes  Will is scheduled for bronchoscopy tomorrow risk and possible complications discussed with patient.      Angiolipoma of right kidney POA: Unknown    Adrenal nodule (CMS-HCC) POA: Unknown  Resolved Problems:    * No resolved hospital problems. *

## 2018-04-11 NOTE — CONSULTS
Patient seen and examined after I had performed a craniotomy on another patient last night.    I discussed the poc and the mri brain and ct cap results with the primary team face to face last night in the ER.    Full consult will be dictated now.    Plan bronchoscopy for diagnosis of large right hilar mass.    Patient may still need a crani for the largest of 4 brain mets in the right  frontal lobe.    Will need xrt and med onc consults also.

## 2018-04-11 NOTE — PROGRESS NOTES
Internal Medicine Interval Note  Note Author: Tawana Long M.D.     Name Saadia Crook     1950   Age/Sex 67 y.o. female   MRN [id number]   Code Status Full Code     After 5PM or if no immediate response to page, please call for cross-coverage  Attending/Team: Dr. Bowens/Mamta See Patient List for primary contact information  Call [phone] to page    1st Call - Day Intern (R1):   Dr. Long 2nd Call - Day Sr. Resident (R2/R3):   Dr. Moreno         Reason for interval visit  (Principal Problem)   Malignancy (CMS-HCC)    Interval Problem Daily Status Update  (24 hours)   No overnight events. Bronchoscopy performed today, path report pending. Neurosurgery following (continue steroids; may consider radiation, craniotomy). No further seizure activity noted.       Review of Systems   Constitutional: Negative for chills and fever.   HENT: Negative for congestion, nosebleeds and sore throat.    Eyes: Negative for blurred vision, double vision and photophobia.   Respiratory: Positive for cough. Negative for hemoptysis and shortness of breath.         C/o mild cough associated with scant white/clear sputum   Cardiovascular: Negative for chest pain, palpitations, orthopnea, leg swelling and PND.   Gastrointestinal: Negative for abdominal pain, nausea and vomiting.   Genitourinary: Negative for dysuria, frequency and urgency.   Musculoskeletal: Positive for back pain. Negative for myalgias.   Skin: Negative for itching and rash.   Neurological: Negative for dizziness, focal weakness, seizures, loss of consciousness, weakness and headaches.       Consultants/Specialty  Neurosurgery (Dr. Masterson)  Pulmonology (Dr. Crandall)      Disposition  Inpatient for further management of intracranial edema and further evaluation of metastatic caner (evaluating for primary)      Quality Measures  Quality-Core Measures   Reviewed items::  Radiology images reviewed, Labs reviewed and Medications reviewed  Сергей  catheter::  No Rushing  DVT: Holding pharmacologic pharmacologic prophylaxis per neurosurgery recommendations.  DVT prophylaxis - mechanical:  SCDs  Ulcer Prophylaxis::  Not indicated          Physical Exam       Vitals:    04/11/18 1040 04/11/18 1041 04/11/18 1045 04/11/18 1050   BP:       Pulse:       Resp:  17 (!) 32 13   Temp:       SpO2: 98% 99% 96% 94%   Weight:       Height:         Body mass index is 22.96 kg/m².    Oxygen Therapy:  Pulse Oximetry: 94 %, O2 (LPM): 2, O2 Delivery: Nasal Cannula    Physical Exam   Constitutional: She is oriented to person, place, and time and well-developed, well-nourished, and in no distress.   HENT:   Head: Normocephalic and atraumatic.   Mouth/Throat: Oropharynx is clear and moist.   Eyes: Conjunctivae and EOM are normal. No scleral icterus.   Neck: Normal range of motion. Neck supple.   Cardiovascular: Normal rate, regular rhythm and normal heart sounds.    Pulmonary/Chest: Breath sounds normal. No respiratory distress. She has no wheezes.   Abdominal: Soft. Bowel sounds are normal. She exhibits no distension. There is no tenderness. There is no rebound and no guarding.   Musculoskeletal: Normal range of motion. She exhibits no edema.   Lymphadenopathy:     She has no cervical adenopathy.   Neurological: She is alert and oriented to person, place, and time. No cranial nerve deficit. GCS score is 15.   Skin: Skin is warm and dry. She is not diaphoretic. No erythema.   Psychiatric: Mood, memory, affect and judgment normal.   Nursing note and vitals reviewed.        Lab Data Review:   4/11/2018  1:01 PM    Recent Labs      04/10/18   0915  04/10/18   1708  04/11/18   0446   SODIUM  131*   --   136   POTASSIUM  4.1   --   4.3   CHLORIDE  98   --   103   CO2  22   --   24   BUN  14   --   13   CREATININE  0.88   --   0.59   MAGNESIUM   --   1.7  2.2   CALCIUM  9.1   --   9.8       Recent Labs      04/10/18   0915  04/11/18   0446   ALTSGPT  18  12   ASTSGOT  22  14   ALKPHOSPHAT   "89  73   TBILIRUBIN  0.4  0.3   GLUCOSE  98  133*       Recent Labs      04/10/18   0915  04/10/18   1708  04/11/18   0445  04/11/18   0446   RBC  4.19*  4.18*   --   4.05*   HEMOGLOBIN  14.6  14.5   --   14.0   HEMATOCRIT  40.4  41.9   --   40.3   PLATELETCT  400  395   --   377   PROTHROMBTM  12.1   --   12.0   --    APTT  34.5  34.5   --    --    INR  0.90   --   0.91   --        Recent Labs      04/10/18   0915  04/10/18   1708  04/11/18   0446   WBC  9.3  9.3  9.4   NEUTSPOLYS  56.80  86.30*  85.50*   LYMPHOCYTES  28.60  11.30*  11.80*   MONOCYTES  11.60  1.50  2.10   EOSINOPHILS  2.10  0.10  0.00   BASOPHILS  0.60  0.40  0.20   ASTSGOT  22   --   14   ALTSGPT  18   --   12   ALKPHOSPHAT  89   --   73   TBILIRUBIN  0.4   --   0.3           Assessment/Plan     * Malignancy (CMS-HCC)   Assessment & Plan    H/o Breast cancer in the past, treated with lumpectomy and radiation. Per patient, she is up-to-date on cancer screening. ED provider ordered CT imaging of chest, abdomen and pelvis to evaluate for primary as CT brain and MRI were significant for metastatic disease.      CT chest abdomen pelvis: \"right hilar mass measures 4.1 cm x 2.7 cm which appears to be malignant. No left lymphadenopathy. Patient has h/o 1ppd x 20 years smoking history. Suspecting lung is primary malignancy.  However given h/o breast cancer, this also needs to be taken into consideration    Plan  - Bronchoscopy performed 4//1/2018. Awaiting path report  - Per neurosurgery note: \"will discuss with radiation - whole brain radiation vs. SRS and possible right frontal craniotomy. \" Also recommended to hold anticoagulation and advance to regular diet today   - Continue SCDs for DVT prophylaxis.   - Will need oncology services for treatment, if biopsy confirms cancer  - Dr. Masterson (neurosurgery) following        Intracranial edema (CMS-HCC)- (present on admission)   Assessment & Plan    CT brain report: \"right frontal mass is somewhat high in " "density consistent with a component of hemorrhage\". ER physician spoke to neurosurgery (Dr. Masterson). Recommended decadron.     MRI brain was also obtained: significant for \"4 brain metastasis near gray-white junction in frontal, left parietal and left occipital lobes\" along with \"vasogenic edema which is causing mild effacement of the right lateral ventricle\". MRI reported \"no hemorrhagic lesions\"    Plan  - Aspiration, seizure, fall precautions  - IV 4 mg decadron q6h per neurosurgery recommendations.   - Q4 Neurochecks  - Labetalol PRN for BP > 160/90 (labetalol PRN)  - PT/OT to evaluate patient for needs  - Transition to PO keppra  - Neurosurgery (Dr. Masterson) following        Seizure (CMS-HCC)- (present on admission)   Assessment & Plan    No previous history of seizures. While patient was being transported to [facility] from [facility], patient apparently had some seizure activity in addition to possible seizure occurring yesterday and last week. Likely secondary to metastatic lesions in brain    Plan  - Transition to PO Keppra 500 mg BID  - q4 neuro checks  - Aspiration, seizure, fall precautions        Adrenal nodule (CMS-[facility])   Assessment & Plan    CT was significant for bilateral adrenal nodules that are not significantly changed compared to prior imaging    Plan  - Continue to monitor        Angiolipoma of right kidney   Assessment & Plan    CT was significant for lesion in the right kidney measuring 1.2 cm containing intralesional fat representing angiomyolipoma.     Plan  - Continue to monitor        Lung nodule   Assessment & Plan    CT performed to identify primary source of malignancy was significant for: 4.5 mm nodule along major fissure on left and 3.5 mm left upper lobe nodule. Subpleural right upper lobe pulmonary nodule up to 3 mm. Subpleural nodule along right major fissure about 4 mm. There is a nodule along minor fissure measuring 9 mm.  These nodules were also found in the setting of right hilar " mass    Plan  - Bronchoscopy 4/11/2018. Awaiting path report          Diverticulosis   Assessment & Plan    H/o colitis, diverticulitis. Per patient last colonoscopy was 6 months ago and was significant for her usual colonic pathologies. CT significant for diverticulosis. Currently stable    Plan  - Continue to monitor        Essential hypertension- (present on admission)   Assessment & Plan    On lisinopril as outpatient. BP was high on admission > 180/90; has since normalized    Plan  - Control blood pressure; have started patient on her outpatient lisinopril  - Labetalol PRN for pressures > 160/90

## 2018-04-11 NOTE — PROGRESS NOTES
Patient returned from procedure, AAOx4, on 2L nc, denies pain, call light in reach, bed alarm on.

## 2018-04-11 NOTE — PROGRESS NOTES
Neurosurgery Progress Note    Subjective:  No HA this am, resting comfortably in bed.   Would like to take a shower prior to biopsy today.   No dizziness, visual disturbances, or pain complaints.     Exam:  A&Ox4  NAD  Spontaneous breathing on room air with normal respiratory effort  Follows commands x4  PERRLA. EOMI.   Face is symmetrical, tongue is midline  CN II-XII grossly intact bilat  No difficulty with speech  Negative pronator drift test  No difficulty with rapid alternating movements  Appropriate muscle tone and sensation intact all four extremities.   Gait not tested.      BP  Min: 114/58  Max: 154/61  Pulse  Av.2  Min: 75  Max: 94  Resp  Av.1  Min: 14  Max: 20  Temp  Av.8 °C (98.3 °F)  Min: 36.6 °C (97.8 °F)  Max: 37.3 °C (99.2 °F)  SpO2  Av.4 %  Min: 91 %  Max: 98 %    No Data Recorded    Recent Labs      04/10/18   0915  04/10/18   1708  04/11/18   0446   WBC  9.3  9.3  9.4   RBC  4.19*  4.18*  4.05*   HEMOGLOBIN  14.6  14.5  14.0   HEMATOCRIT  40.4  41.9  40.3   MCV  96.4  100.2*  99.5*   MCH  34.8*  34.7*  34.6*   MCHC  36.1*  34.6  34.7   RDW  45.8  47.2  47.4   PLATELETCT  400  395  377   MPV  8.9*  10.3  9.0     Recent Labs      04/10/18   0915  18   0446   SODIUM  131*  136   POTASSIUM  4.1  4.3   CHLORIDE  98  103   CO2  22  24   GLUCOSE  98  133*   BUN  14  13   CREATININE  0.88  0.59   CALCIUM  9.1  9.8     Recent Labs      04/10/18   0915  04/10/18   1708  18   0445   APTT  34.5  34.5   --    INR  0.90   --   0.91           Intake/Output       04/10/18 07 - 18 0659 18 07 - 18 0659      4647-1209 [id number] Total  Total       Intake    Total Intake -- -- -- -- -- --       Output    Stool  --  -- --  --  -- --    Number of Times Stooled 1 x -- 1 x -- -- --    Total Output -- -- -- -- -- --       Net I/O     -- -- -- -- -- --          No intake or output data in the 24 hours ending 18 0890         • senna-docusate  2  Tab BID    And   • polyethylene glycol/lytes  1 Packet QDAY PRN    And   • magnesium hydroxide  30 mL QDAY PRN    And   • bisacodyl  10 mg QDAY PRN   • nicotine  14 mg Daily-0600    And   • nicotine polacrilex  2 mg Q HOUR PRN   • acetaminophen  650 mg Q6HRS PRN   • dexamethasone  4 mg Q6HRS   • levETIRAcetam (KEPPRA) IV  500 mg Q12HRS   • ondansetron  4 mg Q4HRS PRN   • labetalol  10 mg Q4HRS PRN   • escitalopram  20 mg DAILY   • lisinopril  20 mg DAILY   • omeprazole  20 mg DAILY   • morphine injection  2 mg Q3HRS PRN       Assessment:  Hospital day #2: 67-year-old female with a history of breast cancer. Probable breast cancer metastases to the brain and lung.  Prophylactic anticoagulation: no, please hold for now.    Plan:  Plan for bronchoscopic biopsy for right hilar mass today per primary team.   Ok for steroids.   Hold anticoagulation.   Q4h neuro checks. Neuro exam intact, gait not tested.    Ok for regular diet today.  Will discuss with Radiation- whole brain radiation versus SRS and possible right frontal craniotomy and extirpation of the right frontal mass.    Case discussed with patient, Dr. Masterson.

## 2018-04-11 NOTE — THERAPY
Physical Therapy Contact note    PT consult received, pt off floor for biopsy in AM; awaiting establishment of medical plan of care possible crani; will hold evaluation until established for accurate assessment of needs;     Yovana Jane, PT, DPT Pager: [phone]

## 2018-04-11 NOTE — NON-PROVIDER
Internal Medicine Medical Student Note  Note Author: Antonio Vides, Student    Name Saadia Crook     1950   Age/Sex 67 y.o. female   MRN [id number]   Code Status Full     After 5PM or if no immediate response to page, please call for cross-coverage  Attending/Team: Kwan See Patient List for primary contact information  Call [phone] to page after hours   1st Call - Day Intern (R1):   Mercedes 2nd Call - Day Sr. Resident (R2/R3):   Tiffanie         Reason for interval visit  (Principal Problem)   Malignancy  Dizziness and Tremor    Interval Problem Daily Status Update  (24 hours)      The patient denies any acute events or seizures overnight.  She states that she slept well overnight.  She awoke with a slight frontal headache which improved with medication.  She denies dizziness, tremors, numbness or tingling, weakness or additional symptoms.  Vital signs have been stable.  Patient has been NPO pending a bronchoscopic mass biopsy today.  Labs including coags are WNL     Review of Systems   Constitutional: Negative for malaise/fatigue.   HENT: Negative for hearing loss, sinus pain and sore throat.    Eyes: Negative for blurred vision and double vision.   Respiratory: Negative for cough, hemoptysis and shortness of breath.    Cardiovascular: Negative for chest pain, orthopnea, claudication and leg swelling.   Gastrointestinal: Negative for abdominal pain, blood in stool, constipation, diarrhea, nausea and vomiting.   Genitourinary: Negative for dysuria and hematuria.   Musculoskeletal: Negative for myalgias.   Neurological: Positive for headaches. Negative for dizziness, tingling, tremors, sensory change, speech change, focal weakness, seizures, loss of consciousness and weakness.       Vitals:    18 1040 18 1041 18 1045 18 1050   BP:       Pulse:       Resp:  17 (!) 32 13   Temp:       SpO2: 98% 99% 96% 94%   Weight:       Height:         Body mass index is 22.96  kg/m².    Oxygen Therapy:  Pulse Oximetry: 94 %, O2 (LPM): 2, O2 Delivery: Nasal Cannula    Physical Exam  Constitutional: She is oriented to person, place, and time. She has no distress.   HENT:   Head: Normocephalic and atraumatic.   Mouth/Throat: Oropharynx is clear and moist.   Eyes: Conjunctivae and EOM are normal. Pupils are equal, round, and reactive to light. No scleral icterus.   Neck: Normal range of motion. No thyromegaly present.   Cardiovascular: Normal rate, regular rhythm and intact distal pulses.  Exam reveals no gallop and no friction rub.    No murmur heard.  Pulmonary/Chest: Effort normal and breath sounds normal. No respiratory distress. She has no wheezes. She has no rales. She exhibits no tenderness.   Abdominal: Soft. Bowel sounds are normal. She exhibits no distension and no mass. There is no tenderness. There is no rebound and no guarding.   Musculoskeletal: Normal range of motion. She exhibits no edema or tenderness.   Lymphadenopathy:     She has no cervical adenopathy.   Neurological: She is alert and oriented to person, place, and time. She has normal reflexes. She displays no tremor, normal speech and normal reflexes. No cranial nerve deficit. She exhibits normal muscle tone. She shows no pronator drift. Coordination normal. GCS score is 15.   Skin: Skin is warm. Rash (scattered pinpoint eruptions to the feet bilaterally) noted.        Assessment/Plan     # Malignancy  - Patient with extensive neurologic symptoms for the last 2 months including dizziness, syncope, sensory changes, and possible seizures.  History of breast cancer treated with remission in 1998.  30 pack year smoking history.   - MR brain: 4 brain metastases near the gray-white junction in the frontal, left parietal, and left occipital lobes and moderate amount of surrounding vasogenic edema which causes mild effacement of the right lateral ventricle.  No evidence of hemorrhagic conversion on MR.    - CT Chest: Right  hilar mass measures 4.1 x 2.7 cm. This appears compatible with malignancy.  Scattered additional pulmonary nodules visualized.   - CBC, CMP and Coags WNL.   - Likely primary lung tumor with metastasis to brain given patient's smoking history and imaging findings.  Possibly breast cancer recurrence vs melanoma.    - Neurosurgery consulted on the patient.  Recommend obtaining a lung mass biopsy.  Will then consider the patient for surgical intervention including craniotomy for removal of fontal mass and/or full brain radiation.   - Bronchoscopy and biopsy performed this AM.      Plan:              - Awaiting biopsy results before consulting oncology.               - Decadron 4 mg q 6 IV              - Maintain blood pressure < 170/90 due to presence of intracranial vasogenic edema.  Labetalol PRN.               - Tylenol and Morphine PRN for pain control.     # Seizure  - Patient with history suspicious for new onset seizures in the setting of an intracranial mass.    - No seizures witnessed since initial presentation.      Plan:              - Keppra 500 mg q 12 for prophylaxis.  Will covert to PO.               - Seizure, fall and aspiration precautions.      # Hypertension  - Patient with history of essential hypertension.    - Normotensive since initial presentation to this facility.      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      Plan:              - Continue outpatient escitalopram 20 mg qd     # Hiatal hernia  - History of such with resultant GERD.  - Patient followed regularly by GI     Plan:              - Omeprazole 20 mg qd.

## 2018-04-11 NOTE — PROGRESS NOTES
Pt. alert and oriented x 4 w/o any complaints of pain at this time. Pulmonologist at the bedside to discuss plans of biopsy for the AM. Pt. educated about the plan of care. Medications given per MAR. Call light in reach.

## 2018-04-12 PROBLEM — R91.8 HILAR MASS: Status: ACTIVE | Noted: 2018-01-01

## 2018-04-12 PROBLEM — C79.31 BRAIN METASTASIS: Status: ACTIVE | Noted: 2018-01-01

## 2018-04-12 NOTE — CARE PLAN
Problem: Safety  Goal: Will remain free from falls  Patient refusing bed alarm, patient understands that she needs to call prior to ambulation. Patient has low fall risk per Tamera José    Problem: Pain Management  Goal: Pain level will decrease to patient's comfort goal  Patient medicated for pain per MAR

## 2018-04-12 NOTE — PROCEDURES
BRONCHOSCOPY PROCEDURE NOTE    Date: 4/11/2018  Time: 6991-2804    Procedure: Diagnostic bronchoscopy    Indication: Rt hilar mass  Consent: Informed consent obtained from patient or designated decision maker after explaining the benefits/risks of the procedure including but not limited to bleeding, infection, airway trauma or loss therof, pneumothorax/hemothorax, arrythmia, or death. Patient or surrogate expressed understanding and agreement and signed consent which can be found in the patient's chart.    Procedure: After obtaining consent, a time-out was performed. Respiratory therapy and nursing at bedside throughout procedure. Patient was provided sedation and analgesia throughout the procedure. The bronchoscope was lubricated. Using a fiberoptic bronchoscope, it was passed at the LT nasopharynx; vocal cords and trachea was entered. Mariaon and primary bronchi were visualized directly and the following intervention was performed: RUL bronchial biopsy washings and brushings Findings as below. Patient tolerated procedure well without any immediate difficulties.     Medications: Versed 3 mg and fentanyl 50 mg    Findings: Upper airway -normal         Trachea to mariano - normal appearing mucosa without lesions or mass,;       R proximal and distal airways -  Shows narrowing of the RUL ant, segment from extrinsic compression; brushings and biopsy done; No endobronchial tumor found.        L proximal and distal airways - normal appearing mucosa without mass/lesion/anatomic variance, secretions:         Samples -bio[sy of RUL ; brushing and washings     Samples: to lab    Complications: minimal bleed <5 ml    CXR (if applicable):NORBERT Crandall M.D.  Pulmonary Medicine

## 2018-04-12 NOTE — PROGRESS NOTES
RN received report, assumed pt care. Patient denies any pain at this time. Pt refusing bed alarm, discussed with both RNs in the room. Patient states she will call before ambulating. Call light with in reach. All other needs met at this time.

## 2018-04-12 NOTE — PROGRESS NOTES
Internal Medicine Interval Note  Note Author: Sonny Moreno M.D.     Name Saadia Crook     1950   Age/Sex 67 y.o. female   MRN [id number]   Code Status Full code     After 5PM or if no immediate response to page, please call for cross-coverage  Attending/Team: Kwan/Mamta See Patient List for primary contact information  Call [phone] to page    1st Call - Day Intern (R1):   Mercedes 2nd Call - Day Sr. Resident (R2/R3):   Tiffanie         Reason for interval visit  (Principal Problem)   Brain metastasis (CMS-HCC)    Interval Problem Daily Status Update  (24 hours)   Patient underwent biopsy of hilar mass yesterday which she tolerated well. She reports her vision to be double this AM. Does not feel dizzy but is using a walker as safety. Is somewhat tearful this AM, consoled patient and expressed empathy.    Review of Systems   Constitutional: Negative for chills and fever.   HENT: Negative for congestion and sore throat.    Eyes: Positive for blurred vision and double vision.   Respiratory: Negative for cough and shortness of breath.    Cardiovascular: Negative for chest pain and palpitations.   Gastrointestinal: Negative for abdominal pain, nausea and vomiting.   Genitourinary: Negative for dysuria and urgency.   Musculoskeletal: Negative for back pain and myalgias.   Skin: Negative for rash.   Neurological: Positive for headaches.   Psychiatric/Behavioral: Positive for depression. Negative for suicidal ideas.       Consultants/Specialty  Neurosurgery: Dr. Masterson  Pulmonary: Dr. Crandall    Disposition  Remain inpatient    Quality Measures  Quality-Core Measures   Reviewed items::  Labs reviewed, Medications reviewed and Radiology images reviewed  Rushing catheter::  No Rushing  DVT prophylaxis pharmacological::  Contraindicated - High bleeding risk  DVT prophylaxis - mechanical:  SCDs  Ulcer Prophylaxis::  No and Not indicated          Physical Exam       Vitals:    18 0000  04/12/18 0400 04/12/18 0800   BP: 122/75 133/69 126/63 112/91   Pulse: 94 80 82 82   Resp: 16 16 16 18   Temp: 36.4 °C (97.6 °F) 36.5 °C (97.7 °F) 36.9 °C (98.5 °F) 36.4 °C (97.5 °F)   SpO2: 93% 95% 96% 93%   Weight:       Height:         Body mass index is 22.96 kg/m².    Oxygen Therapy:  Pulse Oximetry: 93 %, O2 (LPM): 0, O2 Delivery: None (Room Air)    Physical Exam   Constitutional: She is oriented to person, place, and time and well-developed, well-nourished, and in no distress.   HENT:   Head: Normocephalic and atraumatic.   Eyes: EOM are normal. Pupils are equal, round, and reactive to light.   Neck: Normal range of motion. Neck supple.   Cardiovascular: Normal rate, regular rhythm and normal heart sounds.    Pulmonary/Chest: Effort normal and breath sounds normal. No respiratory distress.   Abdominal: Soft. Bowel sounds are normal. There is no tenderness.   Musculoskeletal: She exhibits no edema or tenderness.   Neurological: She is alert and oriented to person, place, and time. GCS score is 15.   Skin: Skin is warm and dry.         Lab Data Review:         4/12/2018  3:12 PM    Recent Labs      04/10/18   0915  04/10/18   1708  04/11/18   0446  04/12/18   0219   SODIUM  131*   --   136  136   POTASSIUM  4.1   --   4.3  4.7   CHLORIDE  98   --   103  104   CO2  22   --   24  25   BUN  14   --   13  15   CREATININE  0.88   --   0.59  0.58   MAGNESIUM   --   1.7  2.2  2.1   CALCIUM  9.1   --   9.8  9.6       Recent Labs      04/10/18   0915  04/11/18   0446  04/12/18   0219   ALTSGPT  18  12   --    ASTSGOT  22  14   --    ALKPHOSPHAT  89  73   --    TBILIRUBIN  0.4  0.3   --    GLUCOSE  98  133*  142*       Recent Labs      04/10/18   0915  04/10/18   1708  04/11/18   0445  04/11/18   0446  04/12/18   0219   RBC  4.19*  4.18*   --   4.05*  3.93*   HEMOGLOBIN  14.6  14.5   --   14.0  13.7   HEMATOCRIT  40.4  41.9   --   40.3  39.0   PLATELETCT  400  395   --   377  387   PROTHROMBTM  12.1   --   12.0   --     --    APTT  34.5  34.5   --    --    --    INR  0.90   --   0.91   --    --        Recent Labs      04/10/18   0915  04/10/18   1708  04/11/18   0446  04/12/18   0219   WBC  9.3  9.3  9.4  13.2*   NEUTSPOLYS  56.80  86.30*  85.50*  87.00*   LYMPHOCYTES  28.60  11.30*  11.80*  9.30*   MONOCYTES  11.60  1.50  2.10  2.90   EOSINOPHILS  2.10  0.10  0.00  0.00   BASOPHILS  0.60  0.40  0.20  0.10   ASTSGOT  22   --   14   --    ALTSGPT  18   --   12   --    ALKPHOSPHAT  89   --   73   --    TBILIRUBIN  0.4   --   0.3   --            Assessment/Plan     * Brain metastasis (CMS-HCC)   Assessment & Plan    4 metastasis in brain with right frontal lobe mass being the largest.  Yet unknown primary. Possibly lung vs breast cancer.  Biopsy of hilar mass which is likely malignant is pending.  SCDs for DVT prophylaxis, avoid pharmacologic prophylaxis.  On decadron to reduce mass effect.  Dr. Masterson (neurosurgery) following. Plan to do a right frontal craniotomy with right mass resection followed by radiation therapy.        Intracranial edema (CMS-HCC)- (present on admission)   Assessment & Plan    Secondary to brain metastasis.  Transitioned from IV decadron to PO decadron 4mg Q6hrs.  Labetalol PRN for BP > 160/90 (labetalol PRN)  PT/OT to evaluate patient for needs.        Seizure (CMS-HCC)- (present on admission)   Assessment & Plan    Likely secondary to metastatic lesions in brain.  Continue PO Keppra 500 mg BID  Q4 neuro checks in place.  Aspiration, seizure, fall precautions        Adrenal nodule (CMS-HCC)   Assessment & Plan    Stable since previous imaging.        Angiolipoma of right kidney   Assessment & Plan    Stable.  No need for intervention right now.        Hilar mass   Assessment & Plan    Identified on CT of the thorax while evaluating for primary of brain metastasis.  Likely malignant.  Bronchoscopy was done on 4/11, biopsy is pending.  No signs of obstruction or atelectasis.        Diverticulosis   Assessment &  Plan    Asymptomatic.        Essential hypertension- (present on admission)   Assessment & Plan    Currently controlled.  Continue lisinopril 20mg daily.

## 2018-04-12 NOTE — PROGRESS NOTES
Pt A&Ox4, denies any numbness and tingling, pain is 3/10 related to headache.  Call light within reach, pt educated on the importance of using the call light when she needs assistance, pt verbalized understanding.

## 2018-04-12 NOTE — PROGRESS NOTES
Neurosurgery Progress Note    Subjective:  No HA this am. Pain well controlled.   States she is starting to feel waves of depression, just hit her about her situation. 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 Does report having a lot of emotional support from family.  Lung biopsy done yesterday, pathology results pending.   No dizziness, visual disturbances, or pain complaints.     Exam:  A&Ox4  NAD  Spontaneous breathing on room air with normal respiratory effort  Follows commands x4  PERRLA. EOMI.   Face is symmetrical, tongue is midline  CN II-XII grossly intact bilat  No difficulty with speech  Negative pronator drift test  No difficulty with rapid alternating movements  Appropriate muscle tone and sensation intact all four extremities.   Gait not tested.      BP  Min: 121/63  Max: 133/69  Pulse  Av  Min: 80  Max: 96  Resp  Av.5  Min: 13  Max: 72  Temp  Av.9 °C (98.4 °F)  Min: 36.4 °C (97.6 °F)  Max: 37.6 °C (99.6 °F)  SpO2  Av.1 %  Min: 91 %  Max: 100 %    No Data Recorded    Recent Labs      04/10/18   1708  04/11/18   0446  18   0219   WBC  9.3  9.4  13.2*   RBC  4.18*  4.05*  3.93*   HEMOGLOBIN  14.5  14.0  13.7   HEMATOCRIT  41.9  40.3  39.0   MCV  100.2*  99.5*  99.2*   MCH  34.7*  34.6*  34.9*   MCHC  34.6  34.7  35.1*   RDW  47.2  47.4  46.4   PLATELETCT  395  377  387   MPV  10.3  9.0  9.2     Recent Labs      04/10/18   0915  186  18   0219   SODIUM  131*  136  136   POTASSIUM  4.1  4.3  4.7   CHLORIDE  98  103  104   CO2  22  24  25   GLUCOSE  98  133*  142*   BUN  14  13  15   CREATININE  0.88  0.59  0.58   CALCIUM  9.1  9.8  9.6     Recent Labs      04/10/18   0915  04/10/18   1708  04/11/18   0445   APTT  34.5  34.5   --    INR  0.90   --   0.91           Intake/Output     None          No intake or output data in the 24 hours ending 18 0739         • levETIRAcetam  500 mg BID   • senna-docusate  2 Tab BID    And    • polyethylene glycol/lytes  1 Packet QDAY PRN    And   • magnesium hydroxide  30 mL QDAY PRN    And   • bisacodyl  10 mg QDAY PRN   • nicotine  14 mg Daily-0600    And   • nicotine polacrilex  2 mg Q HOUR PRN   • acetaminophen  650 mg Q6HRS PRN   • dexamethasone  4 mg Q6HRS   • ondansetron  4 mg Q4HRS PRN   • labetalol  10 mg Q4HRS PRN   • escitalopram  20 mg DAILY   • lisinopril  20 mg DAILY   • omeprazole  20 mg DAILY   • morphine injection  2 mg Q3HRS PRN       Assessment:  Hospital day #3: 67-year-old female with a history of breast cancer. Probable breast cancer metastases to the brain and lung.  Prophylactic anticoagulation: no, please hold for now.    Plan:  Bronchoscopic biopsy for right hilar mass done yesterday, results pending.  On decadron 4mg q6h IV.  Hold anticoagulation.   Continue with pain control.   Ok to mobilize with PT/OT.   On Keppra 500mg Q12 for seizure ppx.   Q4h neuro checks. Neuro exam intact, gait not tested.    Ok for regular diet.  Pending results of pathology, patient may benefit from right frontal craniotomy and resection of right frontal mass followed by whole brain radiation.     Case discussed with patient, Dr. Masterson.

## 2018-04-12 NOTE — PROGRESS NOTES
Patient underwent bronchoscopic biopsy of right hilar mass.    Await pathology results.    Patient may benefit from right craniotomy and resection of right frontal mass followed by whole brain XRT.    Full note to follow.

## 2018-04-12 NOTE — PROGRESS NOTES
Pt A&Ox4, denies any numbness, tingling in bilateral feet, pain is 5/10 (pain medication given).    Call light within reach, pt educated on importance of using the call light when she needs assistance, pt verbalized understanding.

## 2018-04-12 NOTE — NON-PROVIDER
"       Internal Medicine Medical Student Note  Note Author: Antonio MAYS Sintiaskorin, Student    Name Saadia Crook     1950   Age/Sex 67 y.o. female   MRN [id number]   Code Status Full     After 5PM or if no immediate response to page, please call for cross-coverage  Attending/Team: Kwan See Patient List for primary contact information  Call [phone] to page after hours   1st Call - Day Intern (R1):   Mercedes 2nd Call - Day Sr. Resident (R2/R3):   Tiffanie         Reason for interval visit  (Principal Problem)   Malignancy (CMS-HCC)    Interval Problem Daily Status Update  (24 hours)      The patient reports no continued seizure activity overnight.  Vitals have been stable.  She reports increased blurring and \"shadowing\" of her peripheral vision this morning as well as a mild headache which has since resolved.  She denies numbness, tingling or weakness.  Neurosurgery evaluated the patient again this morning.  Recommend continuing steroids for intracranial edema and keppra for seizure prophylaxis.  Will await pathology results before performing resection of right frontal mass.  Then recommend whole brain radiation if malignancy is found.  The patient was tearful and overwhelmed this morning, stating she is worried about the care of her .  She also states that she did not sleep well and is wondering if she can receive medication to help her sleep.  Otherwise she denies new symptoms.          Review of Systems   Constitutional: Negative for malaise/fatigue.   HENT: Negative for hearing loss.    Eyes: Positive for blurred vision. Negative for double vision, photophobia and pain.   Respiratory: Negative for cough and shortness of breath.    Cardiovascular: Negative for chest pain, orthopnea and leg swelling.   Gastrointestinal: Negative for abdominal pain, blood in stool, constipation, diarrhea, nausea and vomiting.   Genitourinary: Negative for dysuria and hematuria.   Musculoskeletal: Negative for " myalgias.   Skin: Positive for rash.   Neurological: Positive for headaches. Negative for dizziness, tingling, tremors, sensory change, speech change, focal weakness, seizures and weakness.     Vitals:    04/11/18 2000 04/12/18 0000 04/12/18 0400 04/12/18 0800   BP: 122/75 133/69 126/63 112/91   Pulse: 94 80 82 82   Resp: 16 16 16 18   Temp: 36.4 °C (97.6 °F) 36.5 °C (97.7 °F) 36.9 °C (98.5 °F) 36.4 °C (97.5 °F)   SpO2: 93% 95% 96% 93%   Weight:       Height:         Body mass index is 22.96 kg/m².    Oxygen Therapy:  Pulse Oximetry: 93 %, O2 (LPM): 0, O2 Delivery: None (Room Air)    Physical Exam  Constitutional: She is oriented to person, place, and time. She has no distress.   HENT:   Head: Normocephalic and atraumatic.   Mouth/Throat: Oropharynx is clear and moist.   Eyes: Conjunctivae and EOM are normal. Pupils are equal, round, and reactive to light. No scleral icterus.   Neck: Normal range of motion. No thyromegaly present.   Cardiovascular: Normal rate, regular rhythm and intact distal pulses.  Exam reveals no gallop and no friction rub.    No murmur heard.  Pulmonary/Chest: Effort normal and breath sounds normal. No respiratory distress. She has no wheezes. She has no rales. She exhibits no tenderness.   Abdominal: Soft. Bowel sounds are normal. She exhibits no distension and no mass. There is no tenderness. There is no rebound and no guarding.   Musculoskeletal: Normal range of motion. She exhibits no edema or tenderness.   Lymphadenopathy:     She has no cervical adenopathy.   Neurological: She is alert and oriented to person, place, and time. She has normal reflexes. She displays no tremor, normal speech and normal reflexes. No cranial nerve deficit. She exhibits normal muscle tone. She shows no pronator drift. Coordination normal. GCS score is 15.   Skin: Skin is warm. Rash (scattered pinpoint eruptions to the feet bilaterally) noted.   Psych: Tearful but affect appropriate for situation.  No suicidal  ideation.      Assessment/Plan      # Malignancy  - Patient with extensive neurologic symptoms for the last 2 months including dizziness, syncope, sensory changes, and possible seizures.  History of breast cancer treated with remission in 1998.  30 pack year smoking history.   - MR brain: 4 brain metastases near the gray-white junction in the frontal, left parietal, and left occipital lobes and moderate amount of surrounding vasogenic edema which causes mild effacement of the right lateral ventricle.  No evidence of hemorrhagic conversion on MR.    - CT Chest: Right hilar mass measures 4.1 x 2.7 cm. This appears compatible with malignancy.  Scattered additional pulmonary nodules visualized.   - CBC, CMP and Coags WNL.   - Likely primary lung tumor with metastasis to brain given patient's smoking history and imaging findings.  Possibly breast cancer recurrence vs melanoma.    - Neurosurgery consulted on the patient.  Will consider the patient for surgical intervention including craniotomy for removal of fontal mass and/or full brain radiation pending biopsy results.   - Awaiting results of biopsy.       Plan:              - Awaiting biopsy results before consulting oncology.               - Decadron 4 mg q 6 IV.  Converting to PO.               - Maintain blood pressure < 170/90 due to presence of intracranial vasogenic edema.  Labetalol PRN.               - Tylenol and Morphine PRN for pain control.     # Seizure  - Patient with history suspicious for new onset seizures in the setting of an intracranial mass.    - No seizures witnessed since initial presentation.      Plan:              - Keppra 500 mg q 12 PO for prophylaxis.                - Seizure, fall and aspiration precautions.      # Hypertension  - Patient with history of essential hypertension.    - Normotensive since initial presentation to this facility.      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   - Difficulty sleeping secondary to anxiety and stress.       Plan:              - Continue outpatient escitalopram 20 mg qd   - Begin Ambien for sleep assistance PRN.      # Hiatal hernia  - History of such with resultant GERD.  - Patient followed regularly by GI     Plan:              - Omeprazole 20 mg qd.

## 2018-04-13 PROBLEM — J15.0: Status: ACTIVE | Noted: 2018-01-01

## 2018-04-13 NOTE — PROGRESS NOTES
Patient is alert and oriented x 4. Has 4/10 headache, declines pain medicine at this time. Scheduled meds given and tolerated. Bed alarm in place. Call light within reach.

## 2018-04-13 NOTE — THERAPY
Physical Therapy Contact Note    Per OT, pt moving very well, supervision to stand by without device; will defer evaluation as pt can mobilize with nursing staff and follow up post crani for accurate assessment of discharge needs;    Yovana Jane, PT, DPT Pager: [phone]

## 2018-04-13 NOTE — CARE PLAN
Problem: Knowledge Deficit  Goal: Knowledge of disease process/condition, treatment plan, diagnostic tests, and medications will improve  Pt and pt's family updated on plan of care.    Problem: Pain Management  Goal: Pain level will decrease to patient's comfort goal  Routine pain assessment performed and appropriate pain management interventions implemented.

## 2018-04-13 NOTE — CARE PLAN
Problem: Safety  Goal: Will remain free from injury  Patient is alert and oriented x 4, able to use call light appropriately. Safety education provided, verbalized understanding.

## 2018-04-13 NOTE — DISCHARGE PLANNING
Medical Social Work    Referral:  Review    Intervention:  Pt is new to floor. SW reviewed pt's chart. SW needs TBD    Plan:  SW will remain available for dc planning

## 2018-04-13 NOTE — PROGRESS NOTES
"Pulmonary Progress Note    Patient ID:   Name:             Saadia Crook   YOB: 1950  Age:                 67 y.o.  female   MRN:               [id number]                                                  Subjective:  Anxious; no hemoptysis ; resp difficulty    Interval Changes:Had bronchoscpy and br. Biopsy yesterday    Objective:   Vitals/ General Appearance:   Weight/BMI: Body mass index is 22.96 kg/m².  Blood pressure 143/72, pulse 78, temperature 36.4 °C (97.5 °F), resp. rate 18, height 1.727 m (5' 8\"), weight 68.5 kg (151 lb), SpO2 96 %, not currently breastfeeding.  Vitals:    04/12/18 0000 04/12/18 0400 04/12/18 0800 04/12/18 1600   BP: 133/69 126/63 112/91 143/72   Pulse: 80 82 82 78   Resp: 16 16 18 18   Temp: 36.5 °C (97.7 °F) 36.9 °C (98.5 °F) 36.4 °C (97.5 °F) 36.4 °C (97.5 °F)   SpO2: 95% 96% 93% 96%   Weight:       Height:         Oxygen Therapy:  Pulse Oximetry: 96 %, O2 (LPM): 0, O2 Delivery: None (Room Air)    Constitutional:   Well developed, Well nourished, No acute distress  HENMT:  Normocephalic, Atraumatic, Oropharynx moist mucous membranes, No oral exudates, Nose normal.  No thyromegaly.  Eyes:  EOMI, Conjunctiva normal, No discharge.  Neck:  Normal range of motion, No cervical tenderness,  no JVD.  Cardiovascular:  Normal heart rate, Normal rhythm, No murmurs, No rubs, No gallops.   Extremitites with intact distal pulses, no cyanosis, or edema.  Lungs:  Normal breath sounds, breath sounds clear to auscultation bilaterally,  no crackles, no wheezing.   Abdomen: Bowel sounds normal, Soft, No tenderness, No guarding, No rebound, No masses, No hepatosplenomegaly.  Skin: Warm, Dry, No erythema, No rash, no induration.  Neurologic: Alert & oriented x 3, No focal deficits noted, cranial nerves II through X are grossly intact.  Psychiatric: Affect normal, Judgment normal, Mood normal.    Labs:  Recent Labs      04/10/18   1708  04/11/18   0446  04/12/18   0219   WBC  9.3  9.4 "  13.2*   RBC  4.18*  4.05*  3.93*   HEMOGLOBIN  14.5  14.0  13.7   HEMATOCRIT  41.9  40.3  39.0   MCV  100.2*  99.5*  99.2*   MCH  34.7*  34.6*  34.9*   MCHC  34.6  34.7  35.1*   RDW  47.2  47.4  46.4   PLATELETCT  395  377  387   MPV  10.3  9.0  9.2     Recent Labs      04/10/18   0915  04/10/18   1708  04/11/18   0445   APTT  34.5  34.5   --    INR  0.90   --   0.91         Recent Labs      04/10/18   0915   TROPONINI  0.04     Recent Labs      04/10/18   0915  04/11/18   0446  04/12/18   0219   SODIUM  131*  136  136   POTASSIUM  4.1  4.3  4.7   CHLORIDE  98  103  104   CO2  22  24  25   GLUCOSE  98  133*  142*   BUN  14  13  15     Recent Labs      04/10/18   0915  04/10/18   1708  04/11/18   0446  04/12/18   0219   SODIUM  131*   --   136  136   POTASSIUM  4.1   --   4.3  4.7   CHLORIDE  98   --   103  104   CO2  22   --   24  25   BUN  14   --   13  15   CREATININE  0.88   --   0.59  0.58   MAGNESIUM   --   1.7  2.2  2.1   CALCIUM  9.1   --   9.8  9.6     Results for orders placed or performed during the hospital encounter of 06/06/16   Echocardiogram Comp w/o Cont   Result Value Ref Range    Left Ventrical Ejection Fraction 75          Imaging:   DX-CHEST-PORTABLE (1 VIEW)   Final Result      Prominence of the right hilum corresponding to the right hilar mass seen on same day CT.      CT-CHEST,ABDOMEN,PELVIS WITH   Final Result      Right hilar mass measures 4.1 x 2.7 cm. This appears compatible with malignancy.      9 mm nodule is seen along the minor fissure on the right.      Bilateral subcentimeter pulmonary nodules.      Tiny hepatic lesion is too small to characterize.      Colonic diverticulosis.      Bilateral adrenal lesions are not significantly changed compared to prior.      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      Tiny nonobstructing left renal calculi may be present.      1 cm retroperitoneal lymph node is unchanged compared to prior.                MR-BRAIN-WITH & W/O   Final Result         1.  4 brain metastases near the gray-white junction in the frontal, left parietal, and left occipital lobes.      2.  The largest metastasis is in the right superior frontal lobe near the gray-white junction and measures 2.2 cm in greatest diameter. Further there is a moderate amount of surrounding vasogenic edema which causes mild effacement of the right lateral    ventricle.          Hospital Medications:    Current Facility-Administered Medications:   •  dexamethasone (DECADRON) tablet 4 mg, 4 mg, Oral, Q6HRS, Sonny Moreno M.D., 4 mg at 04/12/18 1701  •  zolpidem (AMBIEN) tablet 5 mg, 5 mg, Oral, HS PRN, Sonny Moreno M.D.  •  levETIRAcetam (KEPPRA) tablet 500 mg, 500 mg, Oral, BID, Sonny Moreno M.D., 500 mg at 04/12/18 1120  •  senna-docusate (PERICOLACE or SENOKOT S) 8.6-50 MG per tablet 2 Tab, 2 Tab, Oral, BID, Stopped at 04/11/18 0900 **AND** polyethylene glycol/lytes (MIRALAX) PACKET 1 Packet, 1 Packet, Oral, QDAY PRN, 1 Packet at 04/12/18 1701 **AND** magnesium hydroxide (MILK OF MAGNESIA) suspension 30 mL, 30 mL, Oral, QDAY PRN **AND** bisacodyl (DULCOLAX) suppository 10 mg, 10 mg, Rectal, QDAY PRN, Tawana Long M.D.  •  INITIATE NICOTINE REPLACEMENT PROTOCOL , , , Once **AND** nicotine (NICODERM) 14 MG/24HR 14 mg, 14 mg, Transdermal, Daily-0600, 14 mg at 04/12/18 0553 **AND** Protocol 205 PATIENT EDUCATION MATERIALS, , , Once **AND** Protocol 205 Rotate nicotine patch application sites daily , , , CONTINUOUS **AND** nicotine polacrilex (NICORETTE) 2 MG piece 2 mg, 2 mg, Oral, Q HOUR PRN, Tawana Long M.D.  •  acetaminophen (TYLENOL) tablet 650 mg, 650 mg, Oral, Q6HRS PRN, Tawana Long M.D., 650 mg at 04/11/18 1419  •  ondansetron (ZOFRAN) syringe/vial injection 4 mg, 4 mg, Intravenous, Q4HRS PRN, Tawana Long M.D., 4 mg at 04/10/18 1453  •  labetalol (NORMODYNE,TRANDATE) injection 10 mg, 10 mg, Intravenous, Q4HRS PRN, Tawana Long M.D.  •  escitalopram  (LEXAPRO) tablet 20 mg, 20 mg, Oral, DAILY, Tawana Long M.D., 20 mg at 04/12/18 1121  •  lisinopril (PRINIVIL) tablet 20 mg, 20 mg, Oral, DAILY, RYLEE ThompsonD., 20 mg at 04/12/18 1121  •  omeprazole (PRILOSEC) capsule 20 mg, 20 mg, Oral, DAILY, Tawana Long M.D., 20 mg at 04/12/18 1121  •  morphine (pf) 4 mg/ml injection 2 mg, 2 mg, Intravenous, Q3HRS PRN, Sonny Moreno M.D., 2 mg at 04/12/18 1701    Current Outpatient Medications:  Prescriptions Prior to Admission   Medication Sig Dispense Refill Last Dose   • Cyanocobalamin (B-12 PO) Take 2 Tabs by mouth every day.   4/9/2018 at 0600   • ascorbic acid (ASCORBIC ACID) 500 MG Tab Take 500 mg by mouth every day.   4/9/2018 at 0600   • escitalopram (LEXAPRO) 20 MG tablet Take 20 mg by mouth every day.   4/9/2018 at 0600   • lisinopril (PRINIVIL) 20 MG Tab Take 20 mg by mouth every day.   4/9/2018 at 1900   • pantoprazole (PROTONIX) 40 MG Tablet Delayed Response Take 40 mg by mouth every day.   4/10/2018 at 0900   • asa/apap/caffeine (EXCEDRIN) 250-250-65 MG Tab Take 2 Tabs by mouth 2 Times a Day.   4/10/2018 at 0900       Medication Allergy:  No Known Allergies    Assessment and Plan:    Active Problems:    Intracranial edema (CMS-HCC) POA: Yes; possibly metastatic  Patient is started on Decadron intravenously       Malignancy (CMS-HCC) POA: Unknown       Seizure (CMS-HCC) POA: Yes, from Brain Mets  Given a loading dose of Keppra and currently on maintenance       Essential hypertension (Chronic) POA: Yes    Diverticulosis (Chronic) POA: Unknown       Lung nodule vs right hilar mass affecting anterior right upper lobe with possible compression of the bronchus POA: Yes  Had Bronchoscopy and br biopsy yesterday   pathology pending      Angiolipoma of right kidney POA: Unknown    Adrenal nodule (CMS-HCC) POA: Unknown  Resolved Problems:    * No resolved hospital problems. *

## 2018-04-13 NOTE — NON-PROVIDER
Internal Medicine Medical Student Note  Note Author: Antonio Vides, Student    Name Saadia Crook     1950   Age/Sex 67 y.o. female   MRN [id number]   Code Status Full     After 5PM or if no immediate response to page, please call for cross-coverage  Attending/Team: Kwan See Patient List for primary contact information  Call [phone] to page after hours   1st Call - Day Intern (R1):   Mercedes 2nd Call - Day Sr. Resident (R2/R3):   Tiffanie         Reason for interval visit  (Principal Problem)   Brain mass  Seizures    Interval Problem Daily Status Update  (24 hours)      The patient feels well this AM/  She reports a headache this morning which has since resolved.  She denies continued blurriness of vision today.  She has no dizziness, tingling, numbness or weakness of the extremities.  No seizure activity has been noticed since admission.  She is less anxious about her situation than she was yesterday and states that she slept much better last night after receiving sleep medication.     Pathology report from lung biopsy reveals benign lung tissue without malignancy identified.  Neurosurgery is aware of pathology report and would like to proceed with craniotomy and resection of the right frontal mass followed by whole brain radiation, however no date/time has been determined as of yet.  They have OK'd the patient for heparin anticoagulation.    Bronchial washing culture returned positive for lactose fermenting gram negative rods.  The patient has a mild productive cough but no shortness of breath.  She is afebrile with a slight leukocytosis of 13.2.           Review of Systems   Constitutional: Negative for fever and malaise/fatigue.   HENT: Negative for hearing loss.    Eyes: Negative for blurred vision and double vision.   Respiratory: Positive for cough and sputum production. Negative for shortness of breath.    Cardiovascular: Negative for chest pain, palpitations, orthopnea and  leg swelling.   Gastrointestinal: Negative for abdominal pain, blood in stool, constipation, diarrhea, nausea and vomiting.   Genitourinary: Negative for dysuria and hematuria.   Musculoskeletal: Negative for myalgias.   Neurological: Positive for headaches. Negative for dizziness, tingling, tremors, sensory change, speech change, focal weakness, seizures and weakness.   Psychiatric/Behavioral: The patient is not nervous/anxious.        Vitals:    04/12/18 2000 04/13/18 0400 04/13/18 0825 04/13/18 0828   BP: 129/78 130/81 136/69 136/69   Pulse: 84 73 89    Resp: 16 16 20    Temp: 36.2 °C (97.1 °F) 36.3 °C (97.3 °F) 36.8 °C (98.2 °F)    SpO2: 92% 92% 95%    Weight:       Height:         Body mass index is 22.96 kg/m².    Oxygen Therapy:  Pulse Oximetry: 95 %, O2 (LPM): 0, O2 Delivery: None (Room Air)    Physical Exam   Constitutional: She is oriented to person, place, and time and well-developed, well-nourished, and in no distress.   HENT:   Head: Normocephalic and atraumatic.   Mouth/Throat: Oropharynx is clear and moist.   Eyes: Conjunctivae and EOM are normal. Pupils are equal, round, and reactive to light. No scleral icterus.   Neck: Normal range of motion.   Cardiovascular: Normal rate, regular rhythm, normal heart sounds and intact distal pulses.  Exam reveals no gallop and no friction rub.    No murmur heard.  Pulmonary/Chest: Effort normal and breath sounds normal. She has no wheezes. She has no rales.   Abdominal: Soft. Bowel sounds are normal. She exhibits no distension. There is no tenderness. There is no rebound.   Musculoskeletal: Normal range of motion. She exhibits no edema.   Lymphadenopathy:     She has no cervical adenopathy.   Neurological: She is alert and oriented to person, place, and time. She has normal reflexes. She displays normal reflexes. No cranial nerve deficit. She exhibits normal muscle tone. Coordination normal.   Skin: Skin is warm. Rash (unchanged) noted.   Psychiatric: Mood and  affect normal.       Assessment/Plan     # Malignancy  - Patient with extensive neurologic symptoms for the last 2 months including dizziness, syncope, sensory changes, and possible seizures.  History of breast cancer treated with remission in 1998.  30 pack year smoking history.   - MR brain: 4 brain metastases near the gray-white junction in the frontal, left parietal, and left occipital lobes and moderate amount of surrounding vasogenic edema which causes mild effacement of the right lateral ventricle.  No evidence of hemorrhagic conversion on MR.    - CT Chest: Right hilar mass measures 4.1 x 2.7 cm. This appears compatible with malignancy.  Scattered additional pulmonary nodules visualized.   - Coags WNL.   - Likely primary lung tumor with metastasis to brain given patient's smoking history and imaging findings.  Possibly breast cancer recurrence vs melanoma.   - Lung biopsy indicates benign lung tissue with no malignancy identified.    - Neurosurgery aware of biopsy results and would like to proceed with craniotomy and removal of right frontal mass followed by full brain radiation.  Date/Time TBD.  Patient OK'd for heparin anticoagulation.       Plan:              - Will re-consult pulmonology to discuss obtaining a repeat biopsy via bronchoscopy vs IR CT guided biopsy.   - Beginning heparin 5000 U q 12 for anticoagulation.               - Continue Decadron 4 mg q 6 IV PO for prophylaxis against continued intracranial swelling.               - Maintain blood pressure < 170/90 due to presence of intracranial vasogenic edema.  Labetalol PRN.               - Tylenol and Morphine PRN for pain control.     # Seizure  - Patient with history suspicious for new onset seizures in the setting of an intracranial mass.    - No seizures witnessed since initial presentation.   - Likely secondary to intracranial lesions.       Plan:              - Keppra 500 mg q 12 PO for prophylaxis.                - Seizure, fall and  aspiration precautions.     # Culture-Positive sputum  - Patient's bronchial washing sputum culture with positive growth of lactose fermenting G(-) rods.   - Patient with mild productive cough.  - Leukocytosis of 13.2 today.    - Leukocytosis is likely secondary to steroid use, however because this organism is not typical oral josué will treat.    Plan:   - IV Unasyn 3 g q 6hr     # Hypertension  - Patient with history of essential hypertension.    - Normotensive since initial presentation to this facility.      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   - Difficulty sleeping secondary to anxiety and stress.       Plan:              - Continue outpatient escitalopram 20 mg qd              - Begin Ambien for sleep assistance PRN.      # Hiatal hernia  - History of such with resultant GERD.  - Patient followed regularly by GI     Plan:              - Omeprazole 20 mg qd.

## 2018-04-13 NOTE — PROGRESS NOTES
Neurosurgery Progress Note    Subjective:  No HA this am. Pain well controlled.   C/o blurred vision yesterday with far sighted objects when walking, no changes or diplopia today  Feels steady on feet   Anxiety from yesterday about situation improved   Lung biopsy done yesterday, pathology results pending.   No dizziness, nausea or pain complaints.     Pathology as followed:     A. Right upper lobe anterior segment biopsy:         Benign lung tissue demonstrating fibrosis, reactive changes,          focal squamous metaplasia, mild acute and chronic inflammation.         No malignancy identified.  B. Right upper lobe brushings:         Benign bronchial cells some with reactive atypia, degenerated          cells, macrophages some pigment laden, debris and mucus.         No malignant cells identified.  C. Right upper lobe bronchial washings:         No malignant cells identified.         Bronchial cells some with reactive changes, macrophages,          scattered lymphocytes, neutrophils, degenerated cells and          mucus.    Exam:  A&Ox4  NAD  Spontaneous breathing on room air with normal respiratory effort  Follows commands x4  PERRLA. EOMI.   Face is symmetrical, slight tongue deviation to the left   CN II-XII grossly intact bilat  No difficulty with speech  Negative pronator drift test  No difficulty with rapid alternating movements  Appropriate muscle tone and sensation intact all four extremities.   Gait not tested.      BP  Min: 129/78  Max: 143/72  Pulse  Av.3  Min: 73  Max: 84  Resp  Av.7  Min: 16  Max: 18  Temp  Av.3 °C (97.3 °F)  Min: 36.2 °C (97.1 °F)  Max: 36.4 °C (97.5 °F)  SpO2  Av.3 %  Min: 92 %  Max: 96 %    No Data Recorded    Recent Labs      04/10/18   1708  18   0446  18   0219   WBC  9.3  9.4  13.2*   RBC  4.18*  4.05*  3.93*   HEMOGLOBIN  14.5  14.0  13.7   HEMATOCRIT  41.9  40.3  39.0   MCV  100.2*  99.5*  99.2*   MCH  34.7*  34.6*  34.9*   MCHC  34.6  34.7   35.1*   RDW  47.2  47.4  46.4   PLATELETCT  395  377  387   MPV  10.3  9.0  9.2     Recent Labs      04/10/18   0915  04/11/18   0446  04/12/18   0219   SODIUM  131*  136  136   POTASSIUM  4.1  4.3  4.7   CHLORIDE  98  103  104   CO2  22  24  25   GLUCOSE  98  133*  142*   BUN  14  13  15   CREATININE  0.88  0.59  0.58   CALCIUM  9.1  9.8  9.6     Recent Labs      04/10/18   0915  04/10/18   1708  04/11/18   0445   APTT  34.5  34.5   --    INR  0.90   --   0.91           Intake/Output       04/12/18 0700 - 04/13/18 0659 04/13/18 0700 - 04/14/18 0659      [id number] 7310-2344 Total 6399-0325 6882-6662 Total       Intake    Total Intake -- -- -- -- -- --       Output    Stool  --  -- --  --  -- --    Number of Times Stooled 1 x -- 1 x -- -- --    Total Output -- -- -- -- -- --       Net I/O     -- -- -- -- -- --          No intake or output data in the 24 hours ending 04/13/18 0814         • dexamethasone  4 mg Q6HRS   • zolpidem  5 mg HS PRN   • levETIRAcetam  500 mg BID   • senna-docusate  2 Tab BID    And   • polyethylene glycol/lytes  1 Packet QDAY PRN    And   • magnesium hydroxide  30 mL QDAY PRN    And   • bisacodyl  10 mg QDAY PRN   • nicotine  14 mg Daily-0600    And   • nicotine polacrilex  2 mg Q HOUR PRN   • acetaminophen  650 mg Q6HRS PRN   • ondansetron  4 mg Q4HRS PRN   • labetalol  10 mg Q4HRS PRN   • escitalopram  20 mg DAILY   • lisinopril  20 mg DAILY   • omeprazole  20 mg DAILY   • morphine injection  2 mg Q3HRS PRN       Assessment:  Hospital day #4: 67-year-old female with a history of breast cancer. Probable breast cancer metastases to the brain and lung.  Prophylactic anticoagulation: OK for heparin 5000 subcutaneous BID, d/c 12 hours prior to sx     Plan:  Bronchoscopic biopsy for right hilar mass done yesterday, results pending.  On decadron 4mg q6h IV.  OK for heparin anticoagulation   Continue with pain control.   Ok to mobilize with PT/OT.   On Keppra 500mg Q12 for seizure ppx.   Q4h neuro  checks. Neuro exam intact, gait not tested.    Ok for regular diet.  Patient notified of non-malignant pathology.  We discussed craniectomy for resection of mass.   Risks, benefits, alteratives to treatment were discussed. Risks discussed include but not limited to death, coma, paralysis, stroke, memory/mental status changes, changes in coordination and muscle function, infection, bleed.   Patient states she has a lot of family support and is emotionally ready to proceed. She is eager to proceed with treatment of brain mass.   Will plan for craniotomy and resection of right frontal mass followed by whole brain radiation. Surgery date/time TBD  OK for sam for HA     Case discussed with patient, Dr. Masterson.

## 2018-04-13 NOTE — THERAPY
"Occupational Therapy Evaluation completed.   Functional Status: CGA bathing, CGA shower transfer, SPV grooming seated, Full body dressing  Plan of Care: Will benefit from Occupational Therapy 1 times per week  Discharge Recommendations:  Equipment: Will Continue to Assess for Equipment Needs. Post-acute therapy: [facility] DC recommendation for s/p craniotomy    See \"Rehab Therapy-Acute\" Patient Summary Report for complete documentation.      Pt is a 66 y/o female who presents to acute 2' onset of seizures, dizziness, and syncopal event. MRI of brain identified 4 brain metastases in frontal, left paritel and left occipital lobes. Planning for a craniotomy and resection or right frontal mass followed by whole brain radiation. Surgery date has not been set yet. Pt reports she lives in a 1st floor apt w/ 2 steps to get in, a tub shower w/ grab bars and shower chair. Pt reports that she primarily takes care of her Husbands IADLs as he is disabled from previous stroke. Pt presents w/ decreased balance which impacts her ability to safely perform BADLs indicating a need for Acute OT services. Plan on increasing frequency of tx post op.   "

## 2018-04-13 NOTE — CARE PLAN
Problem: Pain Management  Goal: Pain level will decrease to patient's comfort goal  Pain is controlled with current pain management. Will continue to monitor for any side effects.

## 2018-04-13 NOTE — PROGRESS NOTES
Internal Medicine Interval Note  Note Author: Tawana Long M.D.     Name Saadia Crook     1950   Age/Sex 67 y.o. female   MRN [id number]   Code Status Full code     After 5PM or if no immediate response to page, please call for cross-coverage  Attending/Team: Kwan/Mamta See Patient List for primary contact information  Call [phone] to page    1st Call - Day Intern (R1):   Mercedes 2nd Call - Day Sr. Resident (R2/R3):   Tiffanie         Reason for interval visit  (Principal Problem)   Brain metastasis (CMS-HCC)    Interval Problem Daily Status Update  (24 hours)   Biopsy of hilar mass via bronchoscopy was negative for malignancy. After discussing with IR, pulmonology and neurosurgery PA, determined next course of action would be a CT-guided biopsy of right hilar mass on 2018.     C/o headache, which was relieved after receiving tylenol and morphine. Visual changes from previous day have resolved.       Review of Systems   Constitutional: Negative for chills and fever.   HENT: Negative for congestion, nosebleeds and sore throat.    Eyes: Negative for blurred vision, double vision, photophobia and redness.   Respiratory: Positive for cough. Negative for hemoptysis and shortness of breath.         Mild cough   Cardiovascular: Negative for chest pain, palpitations, orthopnea and leg swelling.   Gastrointestinal: Negative for abdominal pain, nausea and vomiting.   Genitourinary: Negative for dysuria, frequency, hematuria and urgency.   Musculoskeletal: Positive for myalgias.   Skin: Negative for rash.   Neurological: Positive for headaches. Negative for dizziness, sensory change, speech change, focal weakness, seizures, loss of consciousness and weakness.   Psychiatric/Behavioral: Positive for depression.        Mood improved since yesterday       Consultants/Specialty  Neurosurgery - Dr. Masterson  Pulmonology - Dr. Crandall   IR - Dr. Roe    Disposition  Inpatient, for further  work-up of primary malignancy and possible neurosurgerical intervention    Quality Measures  Quality-Core Measures   Reviewed items::  Radiology images reviewed, Labs reviewed and Medications reviewed  Rushing catheter::  No Rushing  DVT prophylaxis pharmacological::  Heparin (Can start heparin for DVT prophylaxis per neurosurgery recommendations)  Ulcer Prophylaxis::  Not indicated  : Ceftriaxone for CAP.          Physical Exam       Vitals:    04/12/18 2000 04/13/18 0400 04/13/18 0825 04/13/18 0828   BP: 129/78 130/81 136/69 136/69   Pulse: 84 73 89    Resp: 16 16 20    Temp: 36.2 °C (97.1 °F) 36.3 °C (97.3 °F) 36.8 °C (98.2 °F)    SpO2: 92% 92% 95%    Weight:       Height:         Body mass index is 22.96 kg/m².    Oxygen Therapy:  Pulse Oximetry: 95 %, O2 (LPM): 0, O2 Delivery: None (Room Air)    Physical Exam   Constitutional: She is oriented to person, place, and time and well-developed, well-nourished, and in no distress. No distress.   HENT:   Head: Normocephalic and atraumatic.   Mouth/Throat: Oropharynx is clear and moist. No oropharyngeal exudate.   Eyes: Conjunctivae and EOM are normal. Pupils are equal, round, and reactive to light. No scleral icterus.   Neck: Normal range of motion. Neck supple.   Cardiovascular: Normal rate, regular rhythm and normal heart sounds.    No murmur heard.  Pulmonary/Chest: Effort normal and breath sounds normal. No stridor. No respiratory distress. She has no wheezes. She has no rales.   Abdominal: Soft. There is no tenderness. There is no rebound and no guarding.   Musculoskeletal: Normal range of motion. She exhibits no edema or deformity.   Neurological: She is alert and oriented to person, place, and time. No cranial nerve deficit. GCS score is 15.   Skin: Skin is warm and dry. No rash noted. She is not diaphoretic. No erythema.   Nursing note and vitals reviewed.        Lab Data Review:   4/13/2018  1:23 PM    Recent Labs      04/10/18   1708  04/11/18   0446  04/12/18    0219   SODIUM   --   136  136   POTASSIUM   --   4.3  4.7   CHLORIDE   --   103  104   CO2   --   24  25   BUN   --   13  15   CREATININE   --   0.59  0.58   MAGNESIUM  1.7  2.2  2.1   CALCIUM   --   9.8  9.6       Recent Labs      04/11/18 0446 04/12/18 0219   ALTSGPT  12   --    ASTSGOT  14   --    ALKPHOSPHAT  73   --    TBILIRUBIN  0.3   --    GLUCOSE  133*  142*       Recent Labs      04/10/18   1708  04/11/18   0445  04/11/18   0446  04/12/18   0219   RBC  4.18*   --   4.05*  3.93*   HEMOGLOBIN  14.5   --   14.0  13.7   HEMATOCRIT  41.9   --   40.3  39.0   PLATELETCT  395   --   377  387   PROTHROMBTM   --   12.0   --    --    APTT  34.5   --    --    --    INR   --   0.91   --    --        Recent Labs      04/10/18   1708  04/11/18   0446  04/12/18   0219   WBC  9.3  9.4  13.2*   NEUTSPOLYS  86.30*  85.50*  87.00*   LYMPHOCYTES  11.30*  11.80*  9.30*   MONOCYTES  1.50  2.10  2.90   EOSINOPHILS  0.10  0.00  0.00   BASOPHILS  0.40  0.20  0.10   ASTSGOT   --   14   --    ALTSGPT   --   12   --    ALKPHOSPHAT   --   73   --    TBILIRUBIN   --   0.3   --            Assessment/Plan     * Brain metastasis (CMS-HCC)   Assessment & Plan    4 metastasis in brain with right frontal lobe mass being the largest. Yet unknown primary. Possibly lung vs breast cancer.  Biopsy of hilar mass: negative for malignancy; cytology negative for malignancy  After discussing with Pulm, IR, Neurosurgery, plan will be for either CT-guided biopsy of hilar mass vs craniotomy with resection of right frontal mass. Discussed with Gabbie (PA neurosurgery), will get back to me with more updates. Tentative plan is for craniotomy April 16, 2018 PM      Plan  - Continue decadron to reduce mass effect  - Per neurosurgery recommendations, plan for right frontal craniotomy with resection of right frontal mass followed by whole brain radiation. Ok to start heparin 5000 subcutaneous BID for DVT prophylaxis, d/c 12 hours prior to surgery  -  Fioricet for headaches PRN  - Aspiration, seizure, fall precautions  - Q 4 neuro checks        CAP (community acquired pneumonia) due to Klebsiella pneumoniae (CMS-HCC)   Assessment & Plan    Bronchial washing significant for E. Coli, that is resistant to Ampicillin and bactrim.    Plan  - Treat with IV ceftriaxone        Intracranial edema (CMS-HCC)- (present on admission)   Assessment & Plan    Secondary to brain metastasis.    Plan  - Continue PO decadron 4mg Q6hrs.  - Labetalol PRN for BP > 170/90   - PT/OT to evaluate patient for needs.        Seizure (CMS-HCC)- (present on admission)   Assessment & Plan    Likely secondary to metastatic lesions in brain.    Plan  - Continue PO Keppra 500 mg BID  - Q4 neuro checks in place.  - Aspiration, seizure, fall precautions        Adrenal nodule (CMS-HCC)   Assessment & Plan    Stable since previous imaging.        Angiolipoma of right kidney   Assessment & Plan    Stable. No need for intervention right now.        Hilar mass   Assessment & Plan    Identified on CT of the thorax while evaluating for primary of brain metastasis.  Bronchoscopy was done on 4/11, biopsy negative for malignancy; cytology was negative for malignancy. Biopsy was significant for reactive changes/reactive atypia.  After speaking with pulm, IR, neurosurgery, tentative plan is for patient to undergo craniotomy 4/16/2018 in the evening; however is unable to schedule for craniotomy until later date then will plan for CT guided biopsy         Plan  - Either CT-guided biopsy or craniotomy  April 16 2018.          Diverticulosis   Assessment & Plan    Asymptomatic. Stable        Essential hypertension- (present on admission)   Assessment & Plan    Currently controlled.    Plan  - Continue lisinopril 20mg daily.  - PRN labetalol for BP > 170/90

## 2018-04-13 NOTE — ASSESSMENT & PLAN NOTE
Bronchial washing significant for E. Coli, that is resistant to Ampicillin and bactrim.     Plan  - Treat with IV ceftriaxone to complete 7 days (last day today)

## 2018-04-14 NOTE — PROGRESS NOTES
GLENROY Arredondo notified that pt is requesting for a letter that she can forward to [facility] so she can get a refund for a scheduled flight on Tuesday that she'll be unable to use due to hospitalization. Per GLENROY Arredondo, she'll work on it today.

## 2018-04-14 NOTE — NON-PROVIDER
Internal Medicine Medical Student Note  Note Author: Antonio Vides, Student    Name Saadia Crook     1950   Age/Sex 67 y.o. female   MRN [id number]   Code Status Full     After 5PM or if no immediate response to page, please call for cross-coverage  Attending/Team: Kwan See Patient List for primary contact information  Call [phone] to page after hours   1st Call - Day Intern (R1):   Mercedes 2nd Call - Day Sr. Resident (R2/R3):   Tiffanie         Reason for interval visit  (Principal Problem)   Brain metastasis (CMS-HCC)  Seizures    Interval Problem Daily Status Update  (24 hours)      The patient has had no seizure activity overnight.  Vitals have remained stable.  She continues to report a headache in the morning that resolves with medication.  She denies vision change, numbness, tingling, weakness, dizziness or additional neurologic symptoms.  The patient is tolerating a regular diet well.  She has not had a BM since admission.      Neurosurgery will take the patient for craniotomy and mass resection .  Last dose of heparin will be given tonight.      Review of Systems   Constitutional: Negative for chills, fever and malaise/fatigue.   HENT: Negative for hearing loss.    Eyes: Negative for blurred vision and double vision.   Respiratory: Negative for shortness of breath.    Cardiovascular: Negative for chest pain, palpitations, orthopnea and leg swelling.   Gastrointestinal: Negative for abdominal pain, blood in stool, constipation, diarrhea, melena, nausea and vomiting.   Genitourinary: Negative for dysuria and hematuria.   Musculoskeletal: Negative for myalgias.   Skin: Positive for rash.   Neurological: Positive for headaches. Negative for dizziness, tingling, tremors, sensory change, speech change, focal weakness, seizures and weakness.   Psychiatric/Behavioral: The patient is not nervous/anxious.        Vitals:    18 1812 18 2000 18 0400 18 0800    BP:  148/94 124/71 151/81   Pulse:  80 70 85   Resp:  18 18 16   Temp:  36.1 °C (96.9 °F) 36.3 °C (97.4 °F) 37.2 °C (99 °F)   SpO2: 95% 96% 96% 93%   Weight:       Height:         Body mass index is 22.96 kg/m².    Oxygen Therapy:  Pulse Oximetry: 93 %, O2 (LPM): 0, O2 Delivery: None (Room Air)    Physical Exam  Constitutional: She is oriented to person, place, and time and well-developed, well-nourished, and in no distress.   HENT:   Head: Normocephalic and atraumatic.   Mouth/Throat: Oropharynx is clear and moist.   Eyes: Conjunctivae and EOM are normal. Pupils are equal, round, and reactive to light. No scleral icterus.   Neck: Normal range of motion.   Cardiovascular: Normal rate, regular rhythm, normal heart sounds and intact distal pulses.  Exam reveals no gallop and no friction rub.    No murmur heard.  Pulmonary/Chest: Effort normal and breath sounds normal. She has no wheezes. She has no rales.   Abdominal: Soft. Bowel sounds are normal. She exhibits no distension. There is no tenderness. There is no rebound.   Musculoskeletal: Normal range of motion. She exhibits no edema.   Lymphadenopathy:     She has no cervical adenopathy.   Neurological: She is alert and oriented to person, place, and time. She has normal reflexes. She displays normal reflexes. No cranial nerve deficit. She exhibits normal muscle tone. Coordination normal.   Skin: Skin is warm.  Psychiatric: Mood and affect normal.         Assessment/Plan     # Malignancy  - Patient with extensive neurologic symptoms for the last 2 months including dizziness, syncope, sensory changes, and possible seizures.  History of breast cancer treated with remission in 1998.  30 pack year smoking history.   - MR brain: 4 brain metastases near the gray-white junction in the frontal, left parietal, and left occipital lobes and moderate amount of surrounding vasogenic edema which causes mild effacement of the right lateral ventricle.  No evidence of hemorrhagic  conversion on MR.    - CT Chest: Right hilar mass measures 4.1 x 2.7 cm. This appears compatible with malignancy.  Scattered additional pulmonary nodules visualized.   - Coags WNL.   - Likely primary lung tumor with metastasis to brain given patient's smoking history and imaging findings.  Possibly breast cancer recurrence vs melanoma.   - Lung biopsy indicates benign lung tissue with no malignancy identified.  Likely false negative.      - Neurosurgery aware of biopsy results and would like to proceed with craniotomy and removal of right frontal mass followed by full brain radiation.  Scheduled 4/16.      Plan:              - Plan for surgery as above.                - Last dose of heparin tonight.               - Continue Decadron 4 mg q 6 IV for prophylaxis against continued intracranial swelling.               - Maintain blood pressure < 160/90 due to presence of intracranial vasogenic edema.  Labetalol PRN.               - Tylenol and Morphine PRN for pain control.     # Seizure  - Patient with history suspicious for new onset seizures in the setting of an intracranial mass.    - No seizures witnessed since initial presentation.   - Likely secondary to intracranial lesions.       Plan:              - Keppra 500 mg q 12 PO for prophylaxis.                - Seizure, fall and aspiration precautions.      # Culture-Positive sputum  - Patient's bronchial washing sputum culture with positive growth of lactose fermenting G(-) rods.   - Patient with mild productive cough.  - Leukocytosis of 13.2 today.    - Leukocytosis is likely secondary to steroid use, however because this organism is not typical oral josué will treat.     Plan:              - Ceftriaxone 2 g q 24.      # Hypertension  - Patient with history of essential hypertension.    - Normotensive since initial presentation to this facility.  - Potassium climbing gradually since admission.  Will hold lisinopril.       Plan:              - Amlodipine 5 mg q d.       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   - Difficulty sleeping secondary to anxiety and stress.       Plan:              - Continue outpatient escitalopram 20 mg qd              - Continue Ambien for sleep assistance PRN.      # Hiatal hernia  - History of such with resultant GERD.  - Patient followed regularly by GI     Plan:              - Omeprazole 20 mg qd.

## 2018-04-14 NOTE — PROGRESS NOTES
Assumed pt care at 1945.  Received report from day RN.  Assessment completed.  Pt AOx4.  pt has no complaints of pain at this time.  No other s/s of discomfort or distress. Pt ambulating with staff when needed. Skin intact. Bed in lowest position, locked, and bed alarm has been refused by pt .  Pt calls appropriately.  Treaded socks in place, call light within reach and staff numbers provided.  Pt needs met at this time.

## 2018-04-14 NOTE — PROGRESS NOTES
"Pulmonary Progress Note    Patient ID:   Name:             Saadia Crook   YOB: 1950  Age:                 67 y.o.  female   MRN:               [id number]                                                  Subjective:  anxious    Interval Changes: bronchial biopsy and cytology of brushing and washing =  All negative for malignancy.    Objective:   Vitals/ General Appearance:   Weight/BMI: Body mass index is 22.96 kg/m².  Blood pressure 136/69, pulse 89, temperature 36.8 °C (98.2 °F), resp. rate 20, height 1.727 m (5' 8\"), weight 68.5 kg (151 lb), SpO2 95 %, not currently breastfeeding.  Vitals:    04/12/18 2000 04/13/18 0400 04/13/18 0825 04/13/18 0828   BP: 129/78 130/81 136/69 136/69   Pulse: 84 73 89    Resp: 16 16 20    Temp: 36.2 °C (97.1 °F) 36.3 °C (97.3 °F) 36.8 °C (98.2 °F)    SpO2: 92% 92% 95%    Weight:       Height:         Oxygen Therapy:  Pulse Oximetry: 95 %, O2 (LPM): 0, O2 Delivery: None (Room Air)    Constitutional:   Well developed, Well nourished, No acute distress  HENMT:  Normocephalic, Atraumatic, Oropharynx moist mucous membranes, No oral exudates, Nose normal.  No thyromegaly.  Eyes:  EOMI, Conjunctiva normal, No discharge.  Neck:  Normal range of motion, No cervical tenderness,  no JVD.  Cardiovascular:  Normal heart rate, Normal rhythm, No murmurs, No rubs, No gallops.   Extremitites with intact distal pulses, no cyanosis, or edema.  Lungs:  Normal breath sounds, breath sounds clear to auscultation bilaterally,  no crackles, no wheezing.   Abdomen: Bowel sounds normal, Soft, No tenderness, No guarding, No rebound, No masses, No hepatosplenomegaly.  Skin: Warm, Dry, No erythema, No rash, no induration.  Neurologic: Alert & oriented x 3, No focal deficits noted, cranial nerves II through X are grossly intact.  Psychiatric: Affect normal, Judgment normal, Mood normal.    Labs:  Recent Labs      04/11/18   0446  04/12/18   0219   WBC  9.4  13.2*   RBC  4.05*  3.93* "   HEMOGLOBIN  14.0  13.7   HEMATOCRIT  40.3  39.0   MCV  99.5*  99.2*   MCH  34.6*  34.9*   MCHC  34.7  35.1*   RDW  47.4  46.4   PLATELETCT  377  387   MPV  9.0  9.2     Recent Labs      04/11/18 0445   INR  0.91             Recent Labs      04/11/18   0446  04/12/18 0219   SODIUM  136  136   POTASSIUM  4.3  4.7   CHLORIDE  103  104   CO2  24  25   GLUCOSE  133*  142*   BUN  13  15     Recent Labs      04/11/18   0446  04/12/18   0219   SODIUM  136  136   POTASSIUM  4.3  4.7   CHLORIDE  103  104   CO2  24  25   BUN  13  15   CREATININE  0.59  0.58   MAGNESIUM  2.2  2.1   CALCIUM  9.8  9.6     Results for orders placed or performed during the hospital encounter of 06/06/16   Echocardiogram Comp w/o Cont   Result Value Ref Range    Left Ventrical Ejection Fraction 75          Imaging:   DX-CHEST-PORTABLE (1 VIEW)   Final Result      Prominence of the right hilum corresponding to the right hilar mass seen on same day CT.      CT-CHEST,ABDOMEN,PELVIS WITH   Final Result      Right hilar mass measures 4.1 x 2.7 cm. This appears compatible with malignancy.      9 mm nodule is seen along the minor fissure on the right.      Bilateral subcentimeter pulmonary nodules.      Tiny hepatic lesion is too small to characterize.      Colonic diverticulosis.      Bilateral adrenal lesions are not significantly changed compared to prior.      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      Tiny nonobstructing left renal calculi may be present.      1 cm retroperitoneal lymph node is unchanged compared to prior.               MR-BRAIN-WITH & W/O   Final Result         1.  4 brain metastases near the gray-white junction in the frontal, left parietal, and left occipital lobes.      2.  The largest metastasis is in the right superior frontal lobe near the gray-white junction and measures 2.2 cm in greatest diameter. Further there is a moderate amount of surrounding vasogenic edema which causes mild  effacement of the right lateral    ventricle.          Hospital Medications:    Current Facility-Administered Medications:   •  heparin injection 5,000 Units, 5,000 Units, Subcutaneous, Q12HRS, Tawana Long M.D., 5,000 Units at 04/13/18 1258  •  acetaminophen/caffeine/butalbital 325-40-50 mg (FIORICET) -40 MG per tablet 1 Tab, 1 Tab, Oral, Q6HRS PRN, Tawana Long M.D., 1 Tab at 04/13/18 1257  •  cefTRIAXone (ROCEPHIN) syringe 2 g, 2 g, Intravenous, Q24HRS, Tawana Long M.D., 2 g at 04/13/18 1516  •  dexamethasone (DECADRON) tablet 4 mg, 4 mg, Oral, Q6HRS, Sonny Moreno M.D., 4 mg at 04/13/18 1317  •  zolpidem (AMBIEN) tablet 5 mg, 5 mg, Oral, HS PRN, Sonny Moreno M.D., 5 mg at 04/12/18 2136  •  levETIRAcetam (KEPPRA) tablet 500 mg, 500 mg, Oral, BID, Sonny Moreno M.D., 500 mg at 04/13/18 0829  •  senna-docusate (PERICOLACE or SENOKOT S) 8.6-50 MG per tablet 2 Tab, 2 Tab, Oral, BID, Stopped at 04/11/18 0900 **AND** polyethylene glycol/lytes (MIRALAX) PACKET 1 Packet, 1 Packet, Oral, QDAY PRN, 1 Packet at 04/12/18 1701 **AND** magnesium hydroxide (MILK OF MAGNESIA) suspension 30 mL, 30 mL, Oral, QDAY PRN **AND** bisacodyl (DULCOLAX) suppository 10 mg, 10 mg, Rectal, QDAY PRN, Tawana Long M.D.  •  INITIATE NICOTINE REPLACEMENT PROTOCOL , , , Once **AND** nicotine (NICODERM) 14 MG/24HR 14 mg, 14 mg, Transdermal, Daily-0600, 14 mg at 04/13/18 0515 **AND** Protocol 205 PATIENT EDUCATION MATERIALS, , , Once **AND** Protocol 205 Rotate nicotine patch application sites daily , , , CONTINUOUS **AND** nicotine polacrilex (NICORETTE) 2 MG piece 2 mg, 2 mg, Oral, Q HOUR PRN, Tawana Long M.D.  •  acetaminophen (TYLENOL) tablet 650 mg, 650 mg, Oral, Q6HRS PRN, Tawana Long M.D., 650 mg at 04/13/18 1054  •  ondansetron (ZOFRAN) syringe/vial injection 4 mg, 4 mg, Intravenous, Q4HRS PRN, Tawana Long M.D., 4 mg at 04/10/18 1453  •  labetalol (NORMODYNE,TRANDATE) injection 10 mg, 10 mg, Intravenous, Q4HRS PRN,  Tawana Long M.D.  •  escitalopram (LEXAPRO) tablet 20 mg, 20 mg, Oral, DAILY, Tawana Long M.D., 20 mg at 04/13/18 0829  •  lisinopril (PRINIVIL) tablet 20 mg, 20 mg, Oral, DAILY, Tawana Long M.D., 20 mg at 04/13/18 0828  •  omeprazole (PRILOSEC) capsule 20 mg, 20 mg, Oral, DAILY, Tawana Long M.D., 20 mg at 04/13/18 0829  •  morphine (pf) 4 mg/ml injection 2 mg, 2 mg, Intravenous, Q3HRS PRN, Sonny Moreno M.D., 2 mg at 04/13/18 1307    Current Outpatient Medications:  Prescriptions Prior to Admission   Medication Sig Dispense Refill Last Dose   • Cyanocobalamin (B-12 PO) Take 2 Tabs by mouth every day.   4/9/2018 at 0600   • ascorbic acid (ASCORBIC ACID) 500 MG Tab Take 500 mg by mouth every day.   4/9/2018 at 0600   • escitalopram (LEXAPRO) 20 MG tablet Take 20 mg by mouth every day.   4/9/2018 at 0600   • lisinopril (PRINIVIL) 20 MG Tab Take 20 mg by mouth every day.   4/9/2018 at 1900   • pantoprazole (PROTONIX) 40 MG Tablet Delayed Response Take 40 mg by mouth every day.   4/10/2018 at 0900   • asa/apap/caffeine (EXCEDRIN) 250-250-65 MG Tab Take 2 Tabs by mouth 2 Times a Day.   4/10/2018 at 0900       Medication Allergy:  No Known Allergies    Assessment and Plan:    Active Problems:    Intracranial Mass and edema (CMS-HCC) POA: Yes; possibly metastatic  Patient is started on Decadron intravenously. Primary malignancy most likely from Rt hilar lung mass   Neurosurgery will do craniotomy on Monday to debulk the cranial mass, so will hold off on posibility of EBUS bronchoscopy or IR transthoracic needle biopsy.  Will sign off for now and see again if needed         Seizure (CMS-HCC) POA: Yes, from Brain Mets  Given a loading dose of Keppra and currently on maintenance       Essential hypertension (Chronic) POA: Yes    Diverticulosis (Chronic) POA: Unknown       Lung nodule vs right hilar mass affecting anterior right upper lobe with possible compression of the bronchus POA: Yes  Had Bronchoscopy and br  biopsy    pathology and cytology = negative for malignancy, not surprising as there is no endobronchial tumor seem and mostly extrinsic compression       Angiolipoma of right kidney POA: Unknown    Adrenal nodule (CMS-HCC) POA: Unknown  Resolved Problems:    * No resolved hospital problems. *

## 2018-04-14 NOTE — PROGRESS NOTES
Internal Medicine Interval Note  Note Author: Tawana Long M.D.     Name Saadia Crook     1950   Age/Sex 67 y.o. female   MRN [id number]   Code Status Full Code     After 5PM or if no immediate response to page, please call for cross-coverage  Attending/Team: Dr. Bowens/Mamta See Patient List for primary contact information  Call [phone] to page    1st Call - Day Intern (R1):   Dr. Long 2nd Call - Day Sr. Resident (R2/R3):   Dr. Moreno         Reason for interval visit  (Principal Problem)   Brain metastasis (CMS-HCC)    Interval Problem Daily Status Update  (24 hours)   No overnight events. Has not noted any seizure activity since admission. C/o headaches, mostly when she wakes up in the morning. No associated visual changes today. Headache relieved with pain medications. Denies nausea/vomiting. Craniotomy scheduled for 2018. Last dose of heparin tonight. Hold from 4/15/2018        Review of Systems   Constitutional: Negative for chills and fever.   HENT: Negative for congestion, hearing loss and sore throat.    Eyes: Negative for blurred vision, double vision and photophobia.   Respiratory: Negative for cough, sputum production and shortness of breath.    Cardiovascular: Negative for chest pain, palpitations, orthopnea, leg swelling and PND.   Gastrointestinal: Negative for abdominal pain, nausea and vomiting.   Genitourinary: Negative for dysuria, frequency and urgency.   Musculoskeletal: Positive for myalgias.   Skin: Negative for itching and rash.   Neurological: Negative for dizziness, focal weakness, seizures, weakness and headaches.   Psychiatric/Behavioral: The patient does not have insomnia.         Sleeping well with ambien       Consultants/Specialty  Neurosurgery - Dr. Masterson  Pulmonology - Dr. Raz EISENBERG - Dr. Roe    Disposition  Inpatient, for further work-up of primary malignancy and possible neurosurgerical intervention    Quality  Measures  Quality-Core Measures   Reviewed items::  Medications reviewed, Labs reviewed and Radiology images reviewed  Rushing catheter::  No Rushing  DVT prophylaxis pharmacological::  Heparin  Ulcer Prophylaxis::  Not indicated  : Ceftriaxone for CAP.          Physical Exam       Vitals:    04/13/18 1812 04/13/18 2000 04/14/18 0400 04/14/18 0800   BP:  148/94 124/71 151/81   Pulse:  80 70 85   Resp:  18 18 16   Temp:  36.1 °C (96.9 °F) 36.3 °C (97.4 °F) 37.2 °C (99 °F)   SpO2: 95% 96% 96% 93%   Weight:       Height:         Body mass index is 22.96 kg/m².    Oxygen Therapy:  Pulse Oximetry: 93 %, O2 (LPM): 0, O2 Delivery: None (Room Air)      Physical Exam   Constitutional: She is oriented to person, place, and time and well-developed, well-nourished, and in no distress. No distress.   HENT:   Head: Normocephalic and atraumatic.   Mouth/Throat: Oropharynx is clear and moist. No oropharyngeal exudate.   Eyes: Conjunctivae and EOM are normal. Pupils are equal, round, and reactive to light. No scleral icterus.   Neck: Normal range of motion. Neck supple.   Cardiovascular: Normal rate, regular rhythm and normal heart sounds.    No murmur heard.  Pulmonary/Chest: Effort normal and breath sounds normal. No respiratory distress. She has no wheezes. She has no rales.   Abdominal: Soft. She exhibits no distension. There is no tenderness. There is no rebound.   Musculoskeletal: Normal range of motion. She exhibits no edema or deformity.   Neurological: She is alert and oriented to person, place, and time. No cranial nerve deficit. She exhibits normal muscle tone. GCS score is 15.   Skin: Skin is warm and dry. No rash noted. She is not diaphoretic. No erythema.   Psychiatric: Memory, affect and judgment normal.   Nursing note and vitals reviewed.        Lab Data Review:   4/14/2018  8:35 AM    Recent Labs      04/12/18   0219  04/14/18   0349   SODIUM  136  132*   POTASSIUM  4.7  5.2   CHLORIDE  104  101   CO2  25  22   BUN  15   16   CREATININE  0.58  0.60   MAGNESIUM  2.1  2.2   CALCIUM  9.6  9.6       Recent Labs      04/12/18 0219 04/14/18 0349   ALTSGPT   --   30   ASTSGOT   --   26   ALKPHOSPHAT   --   72   TBILIRUBIN   --   0.4   GLUCOSE  142*  106*       Recent Labs      04/12/18 0219 04/14/18 0349   RBC  3.93*  4.48   HEMOGLOBIN  13.7  15.1   HEMATOCRIT  39.0  43.9   PLATELETCT  387  358       Recent Labs      04/12/18 0219 04/14/18 0349   WBC  13.2*  10.8   NEUTSPOLYS  87.00*  76.70*   LYMPHOCYTES  9.30*  17.70*   MONOCYTES  2.90  4.70   EOSINOPHILS  0.00  0.00   BASOPHILS  0.10  0.20   ASTSGOT   --   26   ALTSGPT   --   30   ALKPHOSPHAT   --   72   TBILIRUBIN   --   0.4           Assessment/Plan     * Brain metastasis (CMS-HCC)   Assessment & Plan    4 metastasis in brain with right frontal lobe mass being the largest. Yet unknown primary. Possibly lung vs breast cancer.  Biopsy of hilar mass: negative for malignancy; cytology negative for malignancy  Plan for craniotomy April 16, 2018 PM    Plan  - Continue decadron to reduce mass effect  - Per neurosurgery recommendations, plan for right frontal craniotomy with resection of right frontal mass followed by whole brain radiation.  - Discontinue heparin  d/c 12 hours prior to surgery. D/c from 4/15/2018. SCDs for DVT prophylaxis  - Fioricet for headaches PRN  - Aspiration, seizure, fall precautions  - Q 4 neuro checks        CAP (community acquired pneumonia) due to Klebsiella pneumoniae (CMS-HCC)   Assessment & Plan    Bronchial washing significant for E. Coli, that is resistant to Ampicillin and bactrim.    Plan  - Treat with IV ceftriaxone        Intracranial edema (CMS-HCC)- (present on admission)   Assessment & Plan    Secondary to brain metastasis.    Plan  - Continue IV decadron 4mg Q6hrs.  - Labetalol PRN for BP > 160/90   - PT/OT to evaluate patient for needs.        Hilar mass   Assessment & Plan    Identified on CT of the thorax while evaluating for primary  of brain metastasis.  Bronchoscopy was done on 4/11, biopsy negative for malignancy; cytology was negative for malignancy. Biopsy was significant for reactive changes/reactive atypia.  Plan for craniotomy 4/16/2018     Plan  - Craniotomy  April 16 2018.        Seizure (CMS-HCC)- (present on admission)   Assessment & Plan    Likely secondary to metastatic lesions in brain.    Plan  - Continue PO Keppra 500 mg BID  - Q4 neuro checks in place.  - Aspiration, seizure, fall precautions        Adrenal nodule (CMS-HCC)   Assessment & Plan    Stable since previous imaging.        Angiolipoma of right kidney   Assessment & Plan    Stable. No need for intervention right now.        Diverticulosis   Assessment & Plan    Asymptomatic. Stable        Essential hypertension- (present on admission)   Assessment & Plan    Currently controlled. On lisinopril 20 mg as outpatient.     Plan  - Discontinue lisinopril as worsening K+  - Start amlodpine 5 mg  - PRN labetalol for BP > 160/90

## 2018-04-14 NOTE — PROGRESS NOTES
Pt A&Ox4, denies any numbness, tingling in bilateral feet, pain is 7/10 (pain medication given).    Call light within reach, pt educated on importance of using the call light when she needs assistance, pt verbalized understanding.

## 2018-04-14 NOTE — CARE PLAN
Problem: Communication  Goal: The ability to communicate needs accurately and effectively will improve  Outcome: PROGRESSING AS EXPECTED  Verbalizes understanding of using call light for needs.

## 2018-04-15 NOTE — PROGRESS NOTES
Internal Medicine Interval Note  Note Author: Tawana Long M.D.     Name Saadia Crook     1950   Age/Sex 67 y.o. female   MRN [id number]   Code Status Full Code     After 5PM or if no immediate response to page, please call for cross-coverage  Attending/Team: Dr. Bowens/Mamta See Patient List for primary contact information  Call [phone] to page    1st Call - Day Intern (R1):   Dr. Long 2nd Call - Day Sr. Resident (R2/R3):   Dr. Moreno         Reason for interval visit  (Principal Problem)   Brain metastasis (CMS-HCC)    Interval Problem Daily Status Update  (24 hours)   No overnight events. Had bowel movement this morning. Cough has been improving. Tearful in AM as she is would like to speak with her father who is 102 years old. Denies suicidal ideation, hopefully she will get better and get back to her baseline to take care of her . Support and counseling provided for patient; patient states she felt better after our conversation     Plan for Craniotomy 2018. Hold pharmacologic DVT prophylaxis. SCDs for DVT prophylaxis. NPO at midnight        Review of Systems   Constitutional: Negative for chills and fever.   HENT: Negative for congestion, nosebleeds and sore throat.    Eyes: Negative for blurred vision, double vision, photophobia and pain.   Respiratory: Positive for cough. Negative for shortness of breath and wheezing.         Significant improvement with cough   Cardiovascular: Negative for chest pain, palpitations, orthopnea, leg swelling and PND.   Gastrointestinal: Negative for abdominal pain, blood in stool, nausea and vomiting.   Genitourinary: Negative for dysuria, frequency and urgency.   Musculoskeletal: Positive for myalgias and neck pain.   Skin: Negative for itching and rash.   Neurological: Positive for headaches. Negative for dizziness, speech change, focal weakness, seizures, loss of consciousness and weakness.        Headache - intermittent,  controlled with pain medications. States the tylenol, fioricet help somewhat   Psychiatric/Behavioral:        Tearful, but feels better after our conversation this morning. States she did not sleep well last night           Consultants/Specialty  Neurosurgery - Dr. Masterson  Pulmonology - Dr. Crandall     Disposition  Inpatient for further-work up of primary malignancy and craniotomy with frontal lobe mass resection scheduled 4/16/2018    Quality Measures  Quality-Core Measures   Reviewed items::  Labs reviewed, Radiology images reviewed and Medications reviewed  Rushing catheter::  No Rushing  DVT: Holding DVT prophylaxis due to am surgery.  DVT prophylaxis - mechanical:  SCDs  : Ceftriaxone day 3 for CAP.          Physical Exam       Vitals:    04/14/18 2000 04/15/18 0000 04/15/18 0400 04/15/18 0818   BP: 110/56 123/62 126/74 138/110   Pulse: 73 74 77 83   Resp: 16 16 16 18   Temp: 36.3 °C (97.4 °F) 36.1 °C (96.9 °F) 36.4 °C (97.5 °F) 36.9 °C (98.4 °F)   SpO2: 93% 93% 97% 93%   Weight:       Height:         Body mass index is 22.96 kg/m².    Oxygen Therapy:  Pulse Oximetry: 93 %, O2 (LPM): 0, O2 Delivery: None (Room Air)    Physical Exam   Constitutional: She is oriented to person, place, and time and well-developed, well-nourished, and in no distress.   HENT:   Head: Normocephalic and atraumatic.   Mouth/Throat: No oropharyngeal exudate.   Eyes: Conjunctivae and EOM are normal. Pupils are equal, round, and reactive to light. No scleral icterus.   Neck: Normal range of motion. Neck supple.   Cardiovascular: Normal rate, regular rhythm and normal heart sounds.    No murmur heard.  Pulmonary/Chest: Effort normal and breath sounds normal. No respiratory distress. She has no wheezes.   Abdominal: Soft. There is no tenderness. There is no rebound and no guarding.   Musculoskeletal: Normal range of motion. She exhibits no edema.   Neurological: She is alert and oriented to person, place, and time. No cranial nerve deficit. GCS  score is 15.   Skin: Skin is warm and dry. No erythema.   Psychiatric: Memory, affect and judgment normal.   Nursing note and vitals reviewed.        Lab Data Review:   4/15/2018  7:52 AM    Recent Labs      04/14/18   0349  04/15/18   0240   SODIUM  132*  136   POTASSIUM  5.2  4.6   CHLORIDE  101  100   CO2  22  28   BUN  16  20   CREATININE  0.60  0.73   MAGNESIUM  2.2  2.4   CALCIUM  9.6  9.9       Recent Labs      04/14/18   0349  04/15/18   0240   ALTSGPT  30   --    ASTSGOT  26   --    ALKPHOSPHAT  72   --    TBILIRUBIN  0.4   --    GLUCOSE  106*  120*       Recent Labs      04/14/18   0349  04/15/18   0240   RBC  4.48  4.57   HEMOGLOBIN  15.1  15.8   HEMATOCRIT  43.9  44.8   PLATELETCT  358  372       Recent Labs      04/14/18   0349  04/15/18   0240   WBC  10.8  7.8   NEUTSPOLYS  76.70*  76.30*   LYMPHOCYTES  17.70*  18.20*   MONOCYTES  4.70  4.80   EOSINOPHILS  0.00  0.10   BASOPHILS  0.20  0.10   ASTSGOT  26   --    ALTSGPT  30   --    ALKPHOSPHAT  72   --    TBILIRUBIN  0.4   --            Assessment/Plan     * Brain metastasis (CMS-HCC)   Assessment & Plan    4 metastasis in brain with right frontal lobe mass being the largest. Yet unknown primary. Possibly lung vs breast cancer.  Biopsy of hilar mass: negative for malignancy; cytology negative for malignancy  Plan for craniotomy April 16, 2018 PM    Plan  - Continue decadron to reduce mass effect  - Craniotomy with right frontal lobe mass resection April 16, 2018  - D/c heparin in anticipation for procedure. SCDs for DVT prophylaxis  - Fioricet for headaches PRN  - Aspiration, seizure, fall precautions  - Q 4 neuro checks        CAP (community acquired pneumonia) due to Klebsiella pneumoniae (CMS-[facility])   Assessment & Plan    Bronchial washing significant for E. Coli, that is resistant to Ampicillin and bactrim.    Plan  - Treat with IV ceftriaxone (day 3)        Intracranial edema (CMS-HCC)- (present on admission)   Assessment & Plan    Secondary to  brain metastasis.    Plan  - Continue IV decadron 4mg Q6hrs.  - Labetalol PRN for BP > 160/90   - PT/OT to evaluate patient for needs.        Hilar mass   Assessment & Plan    Identified on CT of the thorax while evaluating for primary of brain metastasis.  Bronchoscopy was done on 4/11, biopsy negative for malignancy; cytology was negative for malignancy. Biopsy was significant for reactive changes/reactive atypia.  Plan for craniotomy 4/16/2018     Plan  - Craniotomy  April 16 2018.  - holding off on repeat biopsy of hilar mass for now        Seizure (CMS-HCC)- (present on admission)   Assessment & Plan    Likely secondary to metastatic lesions in brain.    Plan  - Continue PO Keppra 500 mg BID  - Q4 neuro checks in place.  - Aspiration, seizure, fall precautions        Adrenal nodule (CMS-HCC)   Assessment & Plan    Stable since previous imaging.        Angiolipoma of right kidney   Assessment & Plan    Stable. No need for intervention right now.        Diverticulosis   Assessment & Plan    Asymptomatic. Stable        Essential hypertension- (present on admission)   Assessment & Plan    Currently controlled. On lisinopril 20 mg as outpatient however due to increasing K+, was discontinued while inpatient    Plan  - Continue amlodpine 5 mg  - PRN labetalol for BP > 160/90

## 2018-04-15 NOTE — PROGRESS NOTES
Received report, assumed care. Pt is A+Ox4. States pain 7/10 gave PRN med, see MAR. Pt is sitting up in bed watching Tv, questions answered, needs met. Pt is on RA o2 is 92%. Denies SOB. Hourly rounding in place. Will continue to monitor.

## 2018-04-15 NOTE — CARE PLAN
Problem: Communication  Goal: The ability to communicate needs accurately and effectively will improve  Encouraged patient to use call light if needing any assistance.    Problem: Pain Management  Goal: Pain level will decrease to patient's comfort goal  Giving patient PRN pain meds to manage patients pain level. Will continue to monitor.

## 2018-04-15 NOTE — PROGRESS NOTES
Neurosurgery Progress Note    Subjective:  No HA this am. Pain well controlled.   Looking forward to crani tomorrow, scheduled for 6pm  Last dose of heparin given last night.   No dizziness, nausea or pain complaints.     Pathology as follows:     A. Right upper lobe anterior segment biopsy:         Benign lung tissue demonstrating fibrosis, reactive changes,          focal squamous metaplasia, mild acute and chronic inflammation.         No malignancy identified.  B. Right upper lobe brushings:         Benign bronchial cells some with reactive atypia, degenerated          cells, macrophages some pigment laden, debris and mucus.         No malignant cells identified.  C. Right upper lobe bronchial washings:         No malignant cells identified.         Bronchial cells some with reactive changes, macrophages,          scattered lymphocytes, neutrophils, degenerated cells and          mucus.    Exam:  A&Ox4  NAD  Spontaneous breathing on room air with normal respiratory effort  Follows commands x4  PERRLA. EOMI.   Face is symmetrical, slight tongue deviation to the left   CN II-XII grossly intact bilat  No difficulty with speech  Negative pronator drift test  No difficulty with rapid alternating movements  Appropriate muscle tone and sensation intact all four extremities.   Gait not tested.      BP  Min: 110/56  Max: 148/84  Pulse  Av.5  Min: 73  Max: 83  Resp  Av.3  Min: 16  Max: 18  Temp  Av.6 °C (97.9 °F)  Min: 36.1 °C (96.9 °F)  Max: 37.4 °C (99.4 °F)  SpO2  Av %  Min: 93 %  Max: 97 %    No Data Recorded    Recent Labs      18   0349  04/15/18   0240   WBC  10.8  7.8   RBC  4.48  4.57   HEMOGLOBIN  15.1  15.8   HEMATOCRIT  43.9  44.8   MCV  98.0*  98.0*   MCH  33.7*  34.6*   MCHC  34.4  35.3*   RDW  45.5  45.1   PLATELETCT  358  372   MPV  9.8  9.7     Recent Labs      18   0349  04/15/18   0240   SODIUM  132*  136   POTASSIUM  5.2  4.6   CHLORIDE  101  100   CO2  22  28   GLUCOSE   106*  120*   BUN  16  20   CREATININE  0.60  0.73   CALCIUM  9.6  9.9               Intake/Output       04/14/18 0700 - 04/15/18 0659 04/15/18 0700 - 04/16/18 0659      [id number] 8175-1296 Total 9985-6108 2473-0672 Total       Intake    P.O.  --  500 500  --  -- --    P.O. -- 500 500 -- -- --    Total Intake -- 500 500 -- -- --       Output    Total Output -- -- -- -- -- --       Net I/O     -- 500 500 -- -- --            Intake/Output Summary (Last 24 hours) at 04/15/18 0928  Last data filed at 04/14/18 2100   Gross per 24 hour   Intake              500 ml   Output                0 ml   Net              500 ml            • amLODIPine  5 mg Q DAY   • dexamethasone  4 mg Q6HRS   • acetaminophen/caffeine/butalbital 325-40-50 mg  1 Tab Q6HRS PRN   • cefTRIAXone (ROCEPHIN) IV  2 g Q24HRS   • zolpidem  5 mg HS PRN   • levETIRAcetam  500 mg BID   • senna-docusate  2 Tab BID    And   • polyethylene glycol/lytes  1 Packet QDAY PRN    And   • magnesium hydroxide  30 mL QDAY PRN    And   • bisacodyl  10 mg QDAY PRN   • nicotine  14 mg Daily-0600    And   • nicotine polacrilex  2 mg Q HOUR PRN   • acetaminophen  650 mg Q6HRS PRN   • ondansetron  4 mg Q4HRS PRN   • labetalol  10 mg Q4HRS PRN   • escitalopram  20 mg DAILY   • omeprazole  20 mg DAILY   • morphine injection  2 mg Q3HRS PRN       Assessment:  Hospital day #6: 67-year-old female with a history of breast cancer. Probable breast cancer metastases to the brain and lung.  Prophylactic anticoagulation: OK for heparin 5000 subcutaneous BID, d/c 12 hours prior to sx     Plan:  Bronchoscopic biopsy for right hilar mass- Benign lung tissue demonstrating fibrosis, reactive changes, focal squamous metaplasia,   On decadron 4mg q6h IV.  Heparin held today in preparation for surgery tomorrow.   Continue with pain control. OK for fioracet for HA  Ok to mobilize with PT/OT.   On Keppra 500mg Q12 for seizure ppx.   Q4h neuro checks. Neuro exam intact, gait not tested.    Ok for  regular diet. NPO at midnight.   Consent ordered.     Plan for craniectomy for resection of mass tomorrow evening- risks, benefits, alteratives to treatment were discussed. Risks discussed include but not limited to death, coma, paralysis, stroke, memory/mental status changes, changes in coordination and muscle function, infection, bleed.       Case discussed with patient, Dr. Masterson.

## 2018-04-15 NOTE — PROGRESS NOTES
Assumed pt care at 1900.  Received report from day RN.  Assessment completed.  Pt AOx4.  pt has no complaints of pain at this time.  No other s/s of discomfort or distress. Pt ambulating with staff when needed. Skin intact. Bed in lowest position, locked, and bed alarm has been refused by pt .  Pt calls appropriately.  Treaded socks in place, call light within reach and staff numbers provided.  Pt needs met at this time.

## 2018-04-15 NOTE — DISCHARGE PLANNING
Medical Social Work    Referral:  Review    Intervention:  SW reviewed pts chart. Pt is pending surgery 4/16    Plan:  SW will remain available for dc planning

## 2018-04-16 NOTE — PROGRESS NOTES
Assumed pt care at 1900.  Received report from day RN.  Assessment completed.  Pt AOx4.  pt has no complaints of pain at this time.  No other s/s of discomfort or distress. Pt ambulating with staff when needed. Skin intact. Bed in lowest position, locked, and bed alarm has been refused by pt. Pt will be NPO at midnight for surgery in AM. Pt calls appropriately.  Treaded socks in place, call light within reach and staff numbers provided.  Pt needs met at this time.

## 2018-04-16 NOTE — PROGRESS NOTES
Internal Medicine Interval Note  Note Author: Tawana Long M.D.     Name Saadia Crook     1950   Age/Sex 67 y.o. female   MRN [id number]   Code Status Full Code     After 5PM or if no immediate response to page, please call for cross-coverage  Attending/Team: Dr. Bowens/Mamta See Patient List for primary contact information  Call [phone] to page    1st Call - Day Intern (R1):   Dr. Long 2nd Call - Day Sr. Resident (R2/R3):   Dr. Bates         Reason for interval visit  (Principal Problem)   Brain metastasis (CMS-HCC)    Interval Problem Daily Status Update  (24 hours)   Overnight, had c/o mild visual changes on left side with headache. Symptoms were relieved with dose of IV morphine. Patient states she is ready for her procedure today. Currently NPO, heparin being held in anticipation of craniotomy with frontal mass resection. SCDs for DVT prophylaxis. As patient is NPO, will change Keppra to IV form.       Review of Systems   Constitutional: Negative for chills, fever and malaise/fatigue.   HENT: Negative for nosebleeds, sinus pain and sore throat.    Eyes: Negative for blurred vision, pain and redness.        Intermittent blurry vision associated with headaches. Currently states vision is intact   Respiratory: Positive for cough. Negative for sputum production and shortness of breath.         Cough has significantly improved   Cardiovascular: Negative for chest pain, palpitations, orthopnea and PND.   Gastrointestinal: Negative for abdominal pain, nausea and vomiting.   Genitourinary: Negative for dysuria, frequency and hematuria.   Musculoskeletal: Negative for back pain and neck pain.   Skin: Negative for itching and rash.   Neurological: Positive for headaches. Negative for dizziness, focal weakness, seizures and loss of consciousness.        Headache around 4:00, relieved with pain medication   Psychiatric/Behavioral: Negative for memory loss. The patient is  nervous/anxious.         Anxious regarding craniotomy however she feels ready to move forward with treatment       Consultants/Specialty  Neurosurgery - Dr. Masterson  Pulmonology - Dr. Crandall        Disposition  Inpatient for further-work up of primary malignancy and craniotomy with frontal lobe mass resection scheduled 4/16/2018    Quality Measures  Quality-Core Measures   Reviewed items::  Labs reviewed and Medications reviewed  Rushing catheter::  No Rushing  DVT: Holding pharmacologic DVT prophylaxis as patient is scheduled for crainotomy with frontal mass resection today.  DVT prophylaxis - mechanical:  SCDs  Ulcer Prophylaxis::  Not indicated  : Ceftriaxone for CAP (day 4 of 7)          Physical Exam       Vitals:    04/15/18 1545 04/15/18 1900 04/15/18 2056 04/16/18 0400   BP: 125/73 (!) 98/62 115/72 132/71   Pulse: 73 79  75   Resp: 20 18 18   Temp: 36.6 °C (97.9 °F) 36.4 °C (97.5 °F)  36.8 °C (98.3 °F)   SpO2: 92% 93%  94%   Weight:       Height:         Body mass index is 22.96 kg/m².    Oxygen Therapy:  Pulse Oximetry: 94 %, O2 (LPM): 0, O2 Delivery: None (Room Air)    Physical Exam   Constitutional: She is oriented to person, place, and time and well-developed, well-nourished, and in no distress.   HENT:   Head: Normocephalic and atraumatic.   Mouth/Throat: No oropharyngeal exudate.   No neck stiffness. ROM intact   Eyes: Conjunctivae and EOM are normal. Pupils are equal, round, and reactive to light. No scleral icterus.   Neck: Normal range of motion. No tracheal deviation present.   Cardiovascular: Normal rate and normal heart sounds.    No murmur heard.  Pulmonary/Chest: Effort normal and breath sounds normal. No respiratory distress. She has no wheezes. She has no rales.   Abdominal: Soft. There is no tenderness. There is no rebound and no guarding.   Musculoskeletal: Normal range of motion. She exhibits no edema.   Neurological: She is alert and oriented to person, place, and time. No cranial nerve  deficit. She exhibits normal muscle tone. GCS score is 15.   Skin: Skin is warm. No rash noted. No erythema.   Psychiatric: Mood, memory, affect and judgment normal.   Nursing note and vitals reviewed.        Lab Data Review:   4/16/2018  7:19 AM    Recent Labs      04/14/18   0349  04/15/18   0240  04/16/18   0412   SODIUM  132*  136  134*   POTASSIUM  5.2  4.6  4.4   CHLORIDE  101  100  99   CO2  22  28  28   BUN  16  20  21   CREATININE  0.60  0.73  0.69   MAGNESIUM  2.2  2.4  2.2   CALCIUM  9.6  9.9  9.7       Recent Labs      04/14/18   0349  04/15/18   0240  04/16/18   0412   ALTSGPT  30   --    --    ASTSGOT  26   --    --    ALKPHOSPHAT  72   --    --    TBILIRUBIN  0.4   --    --    GLUCOSE  106*  120*  106*       Recent Labs      04/14/18   0349  04/15/18   0240  04/16/18   0126   RBC  4.48  4.57  4.78   HEMOGLOBIN  15.1  15.8  16.5*   HEMATOCRIT  43.9  44.8  47.3*   PLATELETCT  358  372  302       Recent Labs      04/14/18   0349  04/15/18   0240  04/16/18   0126   WBC  10.8  7.8  11.4*   NEUTSPOLYS  76.70*  76.30*   --    LYMPHOCYTES  17.70*  18.20*   --    MONOCYTES  4.70  4.80   --    EOSINOPHILS  0.00  0.10   --    BASOPHILS  0.20  0.10   --    ASTSGOT  26   --    --    ALTSGPT  30   --    --    ALKPHOSPHAT  72   --    --    TBILIRUBIN  0.4   --    --            Assessment/Plan     * Brain metastasis (CMS-HCC)   Assessment & Plan    4 metastasis in brain with right frontal lobe mass being the largest. Yet unknown primary. Possibly lung vs breast cancer.  Biopsy of hilar mass: negative for malignancy; cytology negative for malignancy  Plan for craniotomy April 16, 2018 PM    Plan  - Continue decadron to reduce mass effect  - Craniotomy with right frontal lobe mass resection April 16, 2018  - IV fluids with NS at 100/ml per hr as patient is NPO  - holding pharmacologic dvt prophylaxis with heparin in anticipation for procedure. SCDs for DVT prophylaxis  - Aspiration, seizure, fall precautions  - Q 4  neuro checks        CAP (community acquired pneumonia) due to Klebsiella pneumoniae (CMS-HCC)   Assessment & Plan    Bronchial washing significant for E. Coli, that is resistant to Ampicillin and bactrim.    Plan  - Treat with IV ceftriaxone (day 4)        Intracranial edema (CMS-HCC)- (present on admission)   Assessment & Plan    Secondary to brain metastasis.    Plan  - Continue IV decadron 4mg Q6hrs.  - Labetalol PRN for BP > 160/90   - PT/OT to re-evaluate patient for needs after surgery        Hilar mass   Assessment & Plan    Identified on CT of the thorax while evaluating for primary of brain metastasis.  Bronchoscopy was done on 4/11, biopsy negative for malignancy; cytology was negative for malignancy. Biopsy was significant for reactive changes/reactive atypia.  Plan for craniotomy 4/16/2018     Plan  - Craniotomy  April 16 2018.  - holding off on repeat biopsy of hilar mass for now        Seizure (CMS-HCC)- (present on admission)   Assessment & Plan    Likely secondary to metastatic lesions in brain.    Plan  - As patient is NPO, transition keppra to IV; can transition back to PO once patient is able for oral intake  - Q4 neuro checks in place.  - Aspiration, seizure, fall precautions        Adrenal nodule (CMS-HCC)   Assessment & Plan    Stable since previous imaging.        Angiolipoma of right kidney   Assessment & Plan    Stable. No need for intervention right now.        Diverticulosis   Assessment & Plan    Asymptomatic. Stable        Essential hypertension- (present on admission)   Assessment & Plan    Currently controlled. On lisinopril 20 mg as outpatient however due to increasing K+, was discontinued while inpatient    Plan  - As patient is NPO for surgery, holding amlodipine.   - PRN labetalol for BP > 160/90

## 2018-04-16 NOTE — PROGRESS NOTES
AAOX4, WARD   Denies N/T  Up self, steady gait     Pt complaining of headache pain   Medicated per mar with positive results  Voiding without difficulty  Strict NPO, IVF running  Scheduled for MRI at 1600 and surgery at 1800.   Medicated for anxiety with positive results  Hourly rounding. Call light within reach.

## 2018-04-16 NOTE — PROGRESS NOTES
Neurosurgery Progress Note    Subjective:  No HA this am. Pain well controlled.   Looking forward to crani tonight, scheduled for 6pm  Last dose of heparin given Saturday night.   No dizziness, nausea or pain complaints.     Pathology as follows:     A. Right upper lobe anterior segment biopsy:         Benign lung tissue demonstrating fibrosis, reactive changes,          focal squamous metaplasia, mild acute and chronic inflammation.         No malignancy identified.  B. Right upper lobe brushings:         Benign bronchial cells some with reactive atypia, degenerated          cells, macrophages some pigment laden, debris and mucus.         No malignant cells identified.  C. Right upper lobe bronchial washings:         No malignant cells identified.         Bronchial cells some with reactive changes, macrophages,          scattered lymphocytes, neutrophils, degenerated cells and          mucus.    Exam:  A&Ox4  NAD  Spontaneous breathing on room air with normal respiratory effort  Follows commands x4  PERRLA. EOMI.   Face is symmetrical, slight tongue deviation to the left   CN II-XII grossly intact bilat  No difficulty with speech  Negative pronator drift test  No difficulty with rapid alternating movements  Appropriate muscle tone and sensation intact all four extremities.   Gait not tested.      BP  Min: 98/62  Max: 138/110  Pulse  Av.5  Min: 73  Max: 83  Resp  Av.5  Min: 18  Max: 20  Temp  Av.7 °C (98 °F)  Min: 36.4 °C (97.5 °F)  Max: 36.9 °C (98.4 °F)  SpO2  Av %  Min: 92 %  Max: 94 %    No Data Recorded    Recent Labs      04/14/18   0349  04/15/18   0240  18   0126   WBC  10.8  7.8  11.4*   RBC  4.48  4.57  4.78   HEMOGLOBIN  15.1  15.8  16.5*   HEMATOCRIT  43.9  44.8  47.3*   MCV  98.0*  98.0*  99.0*   MCH  33.7*  34.6*  34.5*   MCHC  34.4  35.3*  34.9   RDW  45.5  45.1  45.4   PLATELETCT  358  372  302   MPV  9.8  9.7  10.6     Recent Labs      18   0349  04/15/18   0240   04/16/18   0412   SODIUM  132*  136  134*   POTASSIUM  5.2  4.6  4.4   CHLORIDE  101  100  99   CO2  22  28  28   GLUCOSE  106*  120*  106*   BUN  16  20  21   CREATININE  0.60  0.73  0.69   CALCIUM  9.6  9.9  9.7               Intake/Output       04/15/18 0700 - 04/16/18 0659 04/16/18 0700 - 04/17/18 0659      [id number] 1900-0659 Total 0700-1859 1900-0659 Total       Intake    Total Intake -- -- -- -- -- --       Output    Stool  --  -- --  --  -- --    Number of Times Stooled 1 x -- 1 x -- -- --    Total Output -- -- -- -- -- --       Net I/O     -- -- -- -- -- --          No intake or output data in the 24 hours ending 04/16/18 0705         • levETIRAcetam (KEPPRA) IV  500 mg Q12HRS   • NS   Continuous   • amLODIPine  5 mg Q DAY   • dexamethasone  4 mg Q6HRS   • acetaminophen/caffeine/butalbital 325-40-50 mg  1 Tab Q6HRS PRN   • cefTRIAXone (ROCEPHIN) IV  2 g Q24HRS   • zolpidem  5 mg HS PRN   • senna-docusate  2 Tab BID    And   • polyethylene glycol/lytes  1 Packet QDAY PRN    And   • magnesium hydroxide  30 mL QDAY PRN    And   • bisacodyl  10 mg QDAY PRN   • nicotine  14 mg Daily-0600    And   • nicotine polacrilex  2 mg Q HOUR PRN   • acetaminophen  650 mg Q6HRS PRN   • ondansetron  4 mg Q4HRS PRN   • labetalol  10 mg Q4HRS PRN   • escitalopram  20 mg DAILY   • omeprazole  20 mg DAILY   • morphine injection  2 mg Q3HRS PRN       Assessment:  Hospital day #7: 67-year-old female with a history of breast cancer. Probable breast cancer metastases to the brain and lung.  Prophylactic anticoagulation: OK for heparin 5000 subcutaneous BID, d/c 12 hours prior to sx     Plan:  Bronchoscopic biopsy for right hilar mass- Benign lung tissue demonstrating fibrosis, reactive changes, focal squamous metaplasia,   On decadron 4mg q6h IV.  Last dose of Heparin Saturday night.   Continue with pain control. OK for fioracet for HA  Ok to mobilize with PT/OT.   On Keppra 500mg Q12 for seizure ppx.   Q4h neuro checks. Neuro exam  intact, gait not tested.    NPO  MRI of Brain with contrast, stealth, for this am.   Plan for craniectomy for resection of mass tonight- risks, benefits, alteratives to treatment were discussed. Risks discussed include but not limited to death, coma, paralysis, stroke, memory/mental status changes, changes in coordination and muscle function, infection, bleed.       Case discussed with patient, Dr. Masterson.

## 2018-04-16 NOTE — NON-PROVIDER
"       Internal Medicine Medical Student Note  Note Author: Antonio Doekorin, Student    Name Saadia Crook     1950   Age/Sex 67 y.o. female   MRN [id number]   Code Status Full     After 5PM or if no immediate response to page, please call for cross-coverage  Attending/Team: Kwan See Patient List for primary contact information  Call [phone] to page after hours   1st Call - Day Intern (R1):   Mercedes 2nd Call - Day Sr. Resident (R2/R3):   Paulo         Reason for interval visit  (Principal Problem)   Brain metastasis (CMS-HCC)  Seizure    Interval Problem Daily Status Update  (24 hours)      The patient reports some blurring and visual changes described as \"seeing stars and speckles\" in her left eye yesterday but she believes these were because she had been crying beforehand.  She denies visual changes today.  She reports no headaches, weakness, numbness, tingling or additional symptoms at this time.  She is slightly anxious about her surgery scheduled for this afternoon but is otherwise in good spirits.  The patient is scheduled for an MRI for tumor localization at 4 this afternoon and her surgery is scheduled for 6 pm.  She has been NPO since midnight and Lovenox was discontinued yesterday.      Review of Systems   Constitutional: Negative for chills, fever and malaise/fatigue.   HENT: Negative for hearing loss.    Eyes: Negative for blurred vision, double vision, photophobia and pain.   Respiratory: Negative for cough and shortness of breath.    Cardiovascular: Negative for chest pain, palpitations and leg swelling.   Gastrointestinal: Negative for abdominal pain, blood in stool, constipation, diarrhea, nausea and vomiting.   Genitourinary: Negative for dysuria, frequency and hematuria.   Musculoskeletal: Negative for myalgias.   Skin: Negative for rash.   Neurological: Negative for dizziness, tingling, tremors, sensory change, speech change, focal weakness, seizures and headaches. "   Endo/Heme/Allergies: Negative for polydipsia.   Psychiatric/Behavioral: The patient is nervous/anxious.          Physical Exam       Vitals:    04/15/18 1900 04/15/18 2056 04/16/18 0400 04/16/18 0710   BP: (!) 98/62 115/72 132/71 129/67   Pulse: 79  75 75   Resp: 18 18 17   Temp: 36.4 °C (97.5 °F)  36.8 °C (98.3 °F) 37.2 °C (98.9 °F)   SpO2: 93%  94% 92%   Weight:       Height:         Body mass index is 22.96 kg/m².    Oxygen Therapy:  Pulse Oximetry: 92 %, O2 (LPM): 0, O2 Delivery: None (Room Air)    Physical Exam  Constitutional: She is oriented to person, place, and time and well-developed, well-nourished, and in no distress.   HENT:   Head: Normocephalic and atraumatic.   Mouth/Throat: Oropharynx is clear and moist.   Eyes: Conjunctivae and EOM are normal. Pupils are equal, round, and reactive to light. No scleral icterus.   Neck: Normal range of motion.   Cardiovascular: Normal rate, regular rhythm, normal heart sounds and intact distal pulses.  Exam reveals no gallop and no friction rub.    No murmur heard.  Pulmonary/Chest: Effort normal and breath sounds normal. She has no wheezes. She has no rales.   Abdominal: Soft. Bowel sounds are normal. She exhibits no distension. There is no tenderness. There is no rebound.   Musculoskeletal: Normal range of motion. She exhibits no edema.   Lymphadenopathy:     She has no cervical adenopathy.   Neurological: She is alert and oriented to person, place, and time. She has normal reflexes. She displays normal reflexes. No cranial nerve deficit. She exhibits normal muscle tone. Coordination normal.   Skin: Skin is warm.  Psychiatric: Mood and affect normal.            Assessment/Plan      # Malignancy  - Patient with extensive neurologic symptoms for the last 2 months including dizziness, syncope, sensory changes, and possible seizures.  History of breast cancer treated with remission in 1998.  30 pack year smoking history.   - MR brain: 4 brain metastases near the  gray-white junction in the frontal, left parietal, and left occipital lobes and moderate amount of surrounding vasogenic edema which causes mild effacement of the right lateral ventricle.  No evidence of hemorrhagic conversion on MR.    - CT Chest: Right hilar mass measures 4.1 x 2.7 cm. This appears compatible with malignancy.  Scattered additional pulmonary nodules visualized.   - Coags WNL.   - Likely primary lung tumor with metastasis to brain given patient's smoking history and imaging findings.  Possibly breast cancer recurrence vs melanoma.   - Lung biopsy indicates benign lung tissue with no malignancy identified.  Likely false negative.      - Neurosurgery aware of biopsy results and would like to proceed with craniotomy and removal of right frontal mass followed by full brain radiation.  Scheduled for this evening.       Plan:              - Plan for surgery today as above.                - Continue Decadron 4 mg q 6 IV for prophylaxis against continued intracranial swelling.               - Maintain blood pressure < 160/90 due to presence of intracranial vasogenic edema.  Labetalol PRN.               - Tylenol and Morphine PRN for pain control.     # Seizure  - Patient with history suspicious for new onset seizures in the setting of an intracranial mass.    - No seizures witnessed since initial presentation.   - Likely secondary to intracranial lesions.       Plan:              - Keppra 500 mg q 12 PO converted to IV due to NPO              - Seizure, fall and aspiration precautions.      # Culture-Positive sputum  - Patient's bronchial washing sputum culture with positive growth of lactose fermenting G(-) rods found to be ecoli.   - Leukocytosis down trending.   - Leukocytosis is likely secondary to steroid use, however because this organism is not typical oral josué will treat.     Plan:              - Ceftriaxone 2 g q 24.      # Hypertension  - Patient with history of essential hypertension.    -  Normotensive since initial presentation to this facility.  - Potassium climbing gradually since admission.  Will hold lisinopril.       Plan:              - Amlodipine 5 mg q d.      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   - Difficulty sleeping secondary to anxiety and stress.       Plan:              - Continue outpatient escitalopram 20 mg qd              - Continue Ambien for sleep assistance PRN.    - Ativan .5 mg PRN this afternoon prior to surgery      # Hiatal hernia  - History of such with resultant GERD.  - Patient followed regularly by GI     Plan:              - Omeprazole 20 mg qd.

## 2018-04-16 NOTE — CARE PLAN
Problem: Communication  Goal: The ability to communicate needs accurately and effectively will improve  POC discussed, pt verbalizes understanding, all needs met at this time    Problem: Pain Management  Goal: Pain level will decrease to patient's comfort goal  Medicated per mar with positive results

## 2018-04-17 NOTE — PROGRESS NOTES
Pt transported to ICU by PACU RN.  ICU tech has obtained pt's belongings from neuro floor.  VSS and pt has no neuro deficits at this time.

## 2018-04-17 NOTE — PROGRESS NOTES
Neurosurgery Progress Note    Subjective:  Rushing in  HOB elevated, not OOB yet  CT this AM reviewed   Na 135 this AM   Pain well controlled on oral medications  Denies new pain, numbness, weakness.   Denies HA, positional HA, N/V, changes in vision, dizziness, chest pain, SOB, difficulty breathing, chills    Exam:  Alert, oriented x 4, NAD  Normal respiratory effort, supplemental O2 via NC   Follows commands x4  PERRLA. EOMI.   Face is symmetrical, tongue slight deviation to left   CN II-XII grossly intact bilat,   No difficulty with speech  Trace left pronator drift   No difficulty with rapid alternating movements, no DDK, past-pointing   Appropriate muscle tone and sensation intact all four extremities.    strength BLE/BUE    BP  Min: 131/74  Max: 135/67  Pulse  Av.4  Min: 63  Max: 74  Resp  Av.9  Min: 10  Max: 26  Temp  Av.8 °C (98.3 °F)  Min: 36.6 °C (97.9 °F)  Max: 37.1 °C (98.7 °F)  Monitored Temp  Av.6 °C (97.8 °F)  Min: 36 °C (96.8 °F)  Max: 36.9 °C (98.4 °F)  SpO2  Av.4 %  Min: 94 %  Max: 100 %    No Data Recorded    Recent Labs      04/15/18   0240  18   0126  18   0500   WBC  7.8  11.4*  22.1*   RBC  4.57  4.78  4.32   HEMOGLOBIN  15.8  16.5*  14.7   HEMATOCRIT  44.8  47.3*  43.5   MCV  98.0*  99.0*  100.7*   MCH  34.6*  34.5*  34.0*   MCHC  35.3*  34.9  33.8   RDW  45.1  45.4  46.8   PLATELETCT  372  302  363   MPV  9.7  10.6  9.2     Recent Labs      04/15/18   0240  18   0412  18   0500   SODIUM  136  134*  135   POTASSIUM  4.6  4.4  4.3   CHLORIDE  100  99  101   CO2  28  28  25   GLUCOSE  120*  106*  128*   BUN  20  21  17   CREATININE  0.73  0.69  0.80   CALCIUM  9.9  9.7  8.8               Intake/Output       18 07 - 18 0659 18 07 - 18 0659       Total  Total       Intake    P.O.  --  140 140  --  -- --    P.O. -- 140 140 -- -- --    I.V.  --  3936 7325  --  -- --    Crystalloid  Intake -- 1625 1625 -- -- --    Total Intake -- 1765 1765 -- -- --       Output    Urine  --  1170 1170  150  -- 150    Number of Times Voided -- 1 x 1 x -- -- --    Indwelling Cathether -- 1170 1170 150 -- 150    Stool  --  -- --  --  -- --    Number of Times Stooled 0 x 0 x 0 x 0 x -- 0 x    Blood  --  50 50  --  -- --    Est. Blood Loss (mL) -- 50 50 -- -- --    Total Output -- 1220 1220 150 -- 150       Net I/O     -- 545 545 -150 -- -150            Intake/Output Summary (Last 24 hours) at 04/17/18 0850  Last data filed at 04/17/18 0700   Gross per 24 hour   Intake             1765 ml   Output             1370 ml   Net              395 ml            • oxyCODONE immediate-release  5 mg Q4HRS PRN    Or   • oxyCODONE immediate-release  10 mg Q4HRS PRN   • hydrALAZINE  10-20 mg Q6HRS PRN   • levETIRAcetam (KEPPRA) IV  500 mg Q12HRS   • Pharmacy Consult Request  1 Each PRN   • NS   Continuous   • amLODIPine  5 mg Q DAY   • dexamethasone  4 mg Q6HRS   • acetaminophen/caffeine/butalbital 325-40-50 mg  1 Tab Q6HRS PRN   • cefTRIAXone (ROCEPHIN) IV  2 g Q24HRS   • zolpidem  5 mg HS PRN   • senna-docusate  2 Tab BID    And   • polyethylene glycol/lytes  1 Packet QDAY PRN    And   • magnesium hydroxide  30 mL QDAY PRN    And   • bisacodyl  10 mg QDAY PRN   • nicotine  14 mg Daily-0600    And   • nicotine polacrilex  2 mg Q HOUR PRN   • acetaminophen  650 mg Q6HRS PRN   • ondansetron  4 mg Q4HRS PRN   • labetalol  10 mg Q4HRS PRN   • escitalopram  20 mg DAILY   • omeprazole  20 mg DAILY   • morphine injection  2 mg Q3HRS PRN       Assessment and Plan:  Hospital day #7: 67-year-old female with a history of breast cancer. Probable breast cancer metastases to the brain and lung.  POD#1 Stealth guided resection of a right frontal brain mass with microdissection    CT Head reviewed, expected post-surgical changes without evidence of complication, hemorrhage, no midline shift   Pt is neuro intact and doing well this  AM    Recommend SBP < 140  Q1H vital signs  OK for Q2H neuro checks  OK for diet  OK to mobilize up to chair, ok to mobilize around unit with PT/OT, nursing if symptomatically tolerated and if BP controlled    Continue ICU observation for today, likely transfer to floor tomorrow pending neuro status, vital signs, Na   Continue Keppra 500 mg Q12H  Continue Decadron 4mg Q6H IV  Await final pathology   Maintain Na 140-145, consider Na tabs vs 3% Hypertonic Saline     Prophylactic anticoagulation: no

## 2018-04-17 NOTE — PROGRESS NOTES
Pulmonary Critical Care Progress Note      Date of service:  4/17/2018    Chief Complaint:  Headache    Interval Events:  24 hour interval history reviewed  Reason for visit:   ICU monitoring post craniotomy      This lady has returned to the ICU following craniotomy with Stealth guided resection of a right frontal brain mass.      Review of Systems   Constitutional: Negative for chills, diaphoresis and fever.   HENT: Negative for congestion, ear discharge, ear pain, nosebleeds, sinus pain and tinnitus.    Eyes: Negative for blurred vision, double vision, photophobia, pain, discharge and redness.   Respiratory: Negative for cough, hemoptysis, sputum production, shortness of breath and stridor.    Cardiovascular: Negative for chest pain, palpitations and leg swelling.   Gastrointestinal: Negative for abdominal pain, constipation, diarrhea, nausea and vomiting.   Genitourinary: Negative for dysuria, frequency, hematuria and urgency.   Musculoskeletal: Negative for back pain, joint pain, myalgias and neck pain.   Skin: Negative for itching and rash.   Neurological: Positive for headaches. Negative for tingling, tremors, speech change, focal weakness and seizures.   Endo/Heme/Allergies: Negative for environmental allergies and polydipsia. Does not bruise/bleed easily.   Psychiatric/Behavioral: Negative for hallucinations, substance abuse and suicidal ideas.       Physical Exam   Constitutional: She is oriented to person, place, and time.   HENT:   Right Ear: External ear normal.   Left Ear: External ear normal.   Nose: Nose normal.   Mouth/Throat: Oropharynx is clear and moist.   Dressing in place over the head   Eyes: Conjunctivae and EOM are normal. Pupils are equal, round, and reactive to light. Right eye exhibits no discharge. Left eye exhibits no discharge. No scleral icterus.   Neck: Normal range of motion. Neck supple. No JVD present. No tracheal deviation present.   Cardiovascular: Normal heart sounds and intact  distal pulses.  Exam reveals no gallop and no friction rub.    No murmur heard.  Sinus rhythm   Pulmonary/Chest: No stridor. She has no wheezes. She has no rales.   Abdominal: Soft. Bowel sounds are normal. She exhibits no distension. There is no tenderness. There is no rebound and no guarding.   Musculoskeletal: She exhibits no edema, tenderness or deformity.   No clubbing or cyanosis   Neurological: She is alert and oriented to person, place, and time. No cranial nerve deficit. Coordination normal. GCS score is 15.   No focal weakness   Skin: Skin is warm and dry. No rash noted. She is not diaphoretic. No erythema. No pallor.       PFSH:  No change.    Respiratory:     Pulse Oximetry: 98 %    HemoDynamics:  Pulse: 66, Heart Rate (Monitored): 65  Blood Pressure : 135/67, Arterial BP: 123/52, NIBP: 127/68       Neuro:    Fluids:  Intake/Output       04/15/18 0700 - 04/16/18 0659 04/16/18 0700 - 04/17/18 0659 04/17/18 0700 - 04/18/18 0659      8480-1489 2666-3908 Total 3785-8574 8662-1691 Total 7295-5795 4068-8560 Total       Intake    P.O.  --  -- --  --  90 90  --  -- --    P.O. -- -- -- -- 90 90 -- -- --    I.V.  --  -- --  --  1625 1625  --  -- --    Crystalloid Intake -- -- -- -- 1625 1625 -- -- --    Total Intake -- -- -- -- 1715 1715 -- -- --       Output    Urine  --  -- --  --  595 595  --  -- --    Number of Times Voided -- -- -- -- 1 x 1 x -- -- --    Indwelling Cathether -- -- -- -- 595 595 -- -- --    Stool  --  -- --  --  -- --  --  -- --    Number of Times Stooled 1 x -- 1 x 0 x 0 x 0 x -- -- --    Blood  --  -- --  --  50 50  --  -- --    Est. Blood Loss (mL) -- -- -- -- 50 50 -- -- --    Total Output -- -- -- -- 647 645 -- -- --       Net I/O     -- -- -- -- 1070 1070 -- -- --           Recent Labs      04/15/18   0240  04/16/18   0412   SODIUM  136  134*   POTASSIUM  4.6  4.4   CHLORIDE  100  99   CO2  28  28   BUN  20  21   CREATININE  0.73  0.69   MAGNESIUM  2.4  2.2   CALCIUM  9.9  9.7        GI/Nutrition:    Liver Function  Recent Labs      04/15/18   0240  18   0412   GLUCOSE  120*  106*       Heme:  Recent Labs      04/15/18   0240  18   0126   RBC  4.57  4.78   HEMOGLOBIN  15.8  16.5*   HEMATOCRIT  44.8  47.3*   PLATELETCT  372  302       Infectious Disease:  Monitored Temp  Av.3 °C (97.3 °F)  Min: 36 °C (96.8 °F)  Max: 36.4 °C (97.5 °F)  Temp  Av.9 °C (98.4 °F)  Min: 36.6 °C (97.9 °F)  Max: 37.2 °C (98.9 °F)    Recent Labs      04/15/18   0240  18   0126   WBC  7.8  11.4*   NEUTSPOLYS  76.30*   --    LYMPHOCYTES  18.20*   --    MONOCYTES  4.80   --    EOSINOPHILS  0.10   --    BASOPHILS  0.10   --      Current Facility-Administered Medications   Medication Dose Frequency Provider Last Rate Last Dose   • oxyCODONE immediate-release (ROXICODONE) tablet 5 mg  5 mg Q4HRS PRN Dhruv Alonso M.D.        Or   • oxyCODONE immediate release (ROXICODONE) tablet 10 mg  10 mg Q4HRS PRN Dhruv Alonso M.D.       • hydrALAZINE (APRESOLINE) injection 10-20 mg  10-20 mg Q6HRS PRN Dhruv Alonso M.D.       • levETIRAcetam (KEPPRA) 500 mg in  mL IVPB  500 mg Q12HRS Tawana Long M.D.   Stopped at 18 0014   • Pharmacy Consult Request ...Pain Management Review 1 Each  1 Each PRN HOUSTON GoinsAANTHONY.       • NS infusion   Continuous Dhruv Alonso M.D. 100 mL/hr at 18 2318     • amLODIPine (NORVASC) tablet 5 mg  5 mg Q DAY Tawana Long M.D.   Stopped at 18 0900   • dexamethasone (DECADRON) injection 4 mg  4 mg Q6HRS Tawana Long M.D.   4 mg at 18 2327   • acetaminophen/caffeine/butalbital 325-40-50 mg (FIORICET) -40 MG per tablet 1 Tab  1 Tab Q6HRS PRN Tawana Long M.D.   1 Tab at 18 0415   • cefTRIAXone (ROCEPHIN) syringe 2 g  2 g Q24HRS Tawana Long M.D.   2 g at 18 0756   • zolpidem (AMBIEN) tablet 5 mg  5 mg HS PRN Sonny Moreno M.D.   5 mg at 04/15/18 2050   • senna-docusate (PERICOLACE  or SENOKOT S) 8.6-50 MG per tablet 2 Tab  2 Tab BID Tawana Long M.D.   Stopped at 04/16/18 0900    And   • polyethylene glycol/lytes (MIRALAX) PACKET 1 Packet  1 Packet QDAY PRN Tawana Long M.D.   1 Packet at 04/14/18 1546    And   • magnesium hydroxide (MILK OF MAGNESIA) suspension 30 mL  30 mL QDAY PRN Tawana Long M.D.   30 mL at 04/14/18 2024    And   • bisacodyl (DULCOLAX) suppository 10 mg  10 mg QDAY PRN Tawana Long M.D.       • nicotine (NICODERM) 14 MG/24HR 14 mg  14 mg Daily-0600 Tawana Long M.D.   14 mg at 04/16/18 0500    And   • nicotine polacrilex (NICORETTE) 2 MG piece 2 mg  2 mg Q HOUR PRN Tawana Long M.D.       • acetaminophen (TYLENOL) tablet 650 mg  650 mg Q6HRS PRN Tawana Long M.D.   650 mg at 04/13/18 1054   • ondansetron (ZOFRAN) syringe/vial injection 4 mg  4 mg Q4HRS PRN Tawana Long M.D.   4 mg at 04/10/18 1453   • labetalol (NORMODYNE,TRANDATE) injection 10 mg  10 mg Q4HRS PRN Dhruv Alonso M.D.       • escitalopram (LEXAPRO) tablet 20 mg  20 mg DAILY Tawana Long M.D.   Stopped at 04/16/18 0900   • omeprazole (PRILOSEC) capsule 20 mg  20 mg DAILY Tawana Long M.D.   Stopped at 04/16/18 0900   • morphine (pf) 4 mg/ml injection 2 mg  2 mg Q3HRS PRN Sonny Moreno M.D.   2 mg at 04/16/18 4658     Last reviewed on 4/16/2018  8:28 PM by Jeni Lentz R.N.    Quality  Measures:  Labs reviewed, Medications reviewed and Radiology images reviewed                      Assessment and Plan:    Status post craniotomy with Stealth guided resection of right frontal brain mass   - Pathology pending   - Hourly neurologic checks   - Strict on pressure control - goal systolic blood pressure less than 160 mmHg    Abnormal CT scan of the brain and MRI of the brain   - Bilateral intraparenchymal masses with surrounding vasogenic edema   - Highly suspicious for metastatic malignancy - suspect pulmonary primary   - Decadron, 4 mg IV every 4 hours   - Keppra, 500 mg IV every 12  hours    Abnormal CT scan of the thorax with 4.1 x 2.7 cm right hilar mass   - Highly suspicious for bronchogenic carcinoma   - Bronchoscopy on 4/11 nondiagnostic    Hyponatremia   - IV normal saline    History of breast cancer in 1998   - Prior right breast lumpectomy and radiation therapy    Hypertension   - Continue amlodipine, 5 mg daily   - Strict blood pressure control with goal systolic blood pressure less than 160 mmHg    Dyslipidemia    GERD with hiatal hernia   - Continue omeprazole      I have assessed and reassessed her neurologic status, hemodynamics, cardiovascular status. She is at increased risk for worsening CNS and cardiovascular system dysfunction.    High risk of deterioration and worsening vital organ dysfunction and death without the above critical care interventions.    Critical Care Time:  35 minutes  09697  No time overlap  Time excludes procedures  Discussed with RN

## 2018-04-17 NOTE — CARE PLAN
Problem: Pain  Goal: Alleviation of Pain or a reduction in pain to the patient's comfort goal    Intervention: Pain Management--Medications  Pt on Q1hr neuro checks at this time.  RN asks pt about pain frequently (see flowsheets).  PRN medication available on MAR.        Problem: Hemodynamic Status  Goal: Vital Signs and Fluid Balance Management    Intervention: Hemodynamic Monitoring  Strict BP monitoring is in place.  Q1 hr vitals ordered by neuro-surg.  SBP to remain under 140 per neuro-surg note and PACU report.  PRNs available if needed.

## 2018-04-17 NOTE — PROGRESS NOTES
[facility] ICU Progress Note      Admit Date: 4/10/2018  ICU Day: 2    Resident(s): Leroy Gregg  Attending: KIRBY HERRING/ Dr. Joanne Parada M.D.     Date & Time:   4/17/2018   5:15 PM       Patient ID:    Name:             Saadia Crook   YOB: 1950  Age:                 67 y.o.  female   MRN:               [id number]    ID:  [id number] with h/o left breast cancer treated with lumpectomy and axillary dissection followed by radiation Tx in 1998, presenting with 1 month h/o off & on left UE numbness & weakness & 2 episodes of seizure like activity in the last week. Found to have brain mets, mostly 2/2 lung cancer. Bronch w Bx on lung mass was non diagnostic. Pending Brain Bx done April 16       Consultants:  Neurosurgery Dr Masterson  Pulmonary Dr Crandall   PMA:     Interval Events:  Having some headache, no nausea or vomiting, will start a diet, cont ABX C3 for seven days.    Goal SBP < 140   Maintain Na 140-145, will use Na tabs 1 gm TID, with BNP Q 12 Hr.     Review of Systems   Constitutional: Negative for fever.   Eyes: Negative for pain.   Respiratory: Negative for sputum production.    Cardiovascular: Negative for chest pain.   Gastrointestinal: Negative for abdominal pain and melena.   Genitourinary: Negative for dysuria.   Musculoskeletal: Negative for falls.   Neurological: Positive for headaches. Negative for focal weakness and seizures.     Neuro  Alert, oriented x 4  Follows commands x4  PERRLA. EOMI.   No focal weakness   Power 5/5 bilaterally upper and lower extremities     Psych: Affect, mood, judgement normal.    Respiratory:     Respiration: (!) 29, Pulse Oximetry: 97 %  Clear to auscultation good effort   Chest Tube Drains:          Invalid input(s): [id number]    HemoDynamics:  Pulse: 68, Heart Rate (Monitored): 66 Blood Pressure : 135/67, Arterial BP: 137/52, NIBP: 130/68      Neuro:      Fluids:    Date 04/17/18 0700 - 04/18/18 0659   Shift 5079-4835 3838-2308 7312-0663 24 Hour  Total   I  N  T  A  K  E   P.O. 700   700      P.O. 700   700    Shift Total 700   700   O  U  T  P  U  T   Urine 1900      Indwelling Cathether 1900    Stool          Number of Times Stooled 0 x   0 x    Shift Total 1900   NET -1200   -1200        Intake/Output Summary (Last 24 hours) at 18 0834  Last data filed at 18 0700   Gross per 24 hour   Intake             1765 ml   Output             1370 ml   Net              395 ml       Weight: 65.2 kg (143 lb 11.8 oz)  Body mass index is 21.86 kg/m².    Recent Labs      04/15/18   0240  04/16/18   0412  04/17/18   0500   SODIUM  136  134*  135   POTASSIUM  4.6  4.4  4.3   CHLORIDE  100  99  101   CO2  28  28  25   BUN  20  21  17   CREATININE  0.73  0.69  0.80   MAGNESIUM  2.4  2.2  2.0   CALCIUM  9.9  9.7  8.8       GI/Nutrition:  Recent Labs      04/15/18   0240  04/16/18   0412  04/17/18   0500   ALTSGPT   --    --   29   ASTSGOT   --    --   30   ALKPHOSPHAT   --    --   61   TBILIRUBIN   --    --   0.4   GLUCOSE  120*  106*  128*       Heme:  Recent Labs      04/15/18   0240  04/16/18   0126  04/17/18   0500   RBC  4.57  4.78  4.32   HEMOGLOBIN  15.8  16.5*  14.7   HEMATOCRIT  44.8  47.3*  43.5   PLATELETCT  372  302  363       Infectious Disease:  Monitored Temp  Av.8 °C (98.2 °F)  Min: 36 °C (96.8 °F)  Max: 37.3 °C (99.1 °F)  Temp  Av.8 °C (98.3 °F)  Min: 36.6 °C (97.9 °F)  Max: 37.1 °C (98.7 °F)  Recent Labs      04/15/18   0240  04/16/18   01218   0500   WBC  7.8  11.4*  22.1*   NEUTSPOLYS  76.30*   --   88.80*   LYMPHOCYTES  18.20*   --   5.60*   MONOCYTES  4.80   --   4.80   EOSINOPHILS  0.10   --   0.00   BASOPHILS  0.10   --   0.20   ASTSGOT   --    --   30   ALTSGPT   --    --   29   ALKPHOSPHAT   --    --   61   TBILIRUBIN   --    --   0.4       Meds:  • oxyCODONE immediate-release  5 mg      Or   • oxyCODONE immediate-release  10 mg     • hydrALAZINE  10-20 mg     • sodium chloride  1 g     • [START ON  4/18/2018] cefTRIAXone (ROCEPHIN) IV  2 g     • levETIRAcetam (KEPPRA) IV  500 mg Stopped (04/17/18 0923)   • Pharmacy Consult Request  1 Each     • NS   83 mL/hr at 04/17/18 1029   • amLODIPine  5 mg     • dexamethasone  4 mg     • acetaminophen/caffeine/butalbital 325-40-50 mg  1 Tab     • zolpidem  5 mg     • senna-docusate  2 Tab      And   • polyethylene glycol/lytes  1 Packet      And   • magnesium hydroxide  30 mL      And   • bisacodyl  10 mg     • nicotine  14 mg      And   • nicotine polacrilex  2 mg     • acetaminophen  650 mg     • ondansetron  4 mg     • labetalol  10 mg     • escitalopram  20 mg     • omeprazole  20 mg     • morphine injection  2 mg              Brain metastasis (CMS-HCC)  4 metastasis in brain with right frontal lobe mass being the largest. Yet unknown primary. Possibly lung vs breast cancer.  Biopsy of hilar mass: negative for malignancy; cytology negative for malignancy  S/p craniotomy April 16, 2018 PM    - Continue decadron to reduce mass effect  - Aspiration, seizure, fall precautions  - Neuro checks    Intracranial edema (CMS-HCC)  Secondary to brain metastasis.    - Continue IV decadron 4mg Q6hrs.  - Labetalol PRN for BP   - Goal SBP < 140  - Na goal 140-145 salt tablets TID  - BMP Q12 Hr      Seizure (CMS-HCC)  Likely secondary to metastatic lesions in brain.    - Keppra   - Neuro checks  - Aspiration, seizure, fall precautions    Hilar mass  Identified on CT of the thorax while evaluating for primary of brain metastasis.  Bronchoscopy was done on 4/11, biopsy negative for malignancy; cytology was negative for malignancy. Biopsy was significant for reactive changes/reactive atypia.  S/p craniotomy 4/16/2018   Pending result     Angiolipoma of right kidney  Stable. No need for intervention right now.     Adrenal nodule (CMS-HCC)  Stable since previous imaging.     Essential hypertension  Currently controlled. On lisinopril 20 mg as outpatient however due to increasing K+, was  discontinued while inpatient  SBP goal < 140    CAP (community acquired pneumonia) due to Klebsiella pneumoniae (CMS-HCC)  Bronchial washing significant for E. Coli, that is resistant to Ampicillin and bactrim.     - Treat with IV ceftriaxone to complete 7 days     Diverticulosis  Asymptomatic. Stable       Quality Measures:  DVT Prophylaxis: SCDs  Ulcer Prophylaxis: PPI  Antibiotics: Ceftriaxone   Lines: PIV    DISPO: ICU    CODE STATUS: FULL

## 2018-04-17 NOTE — PROGRESS NOTES
Pt seen and evaluated in recovery    A&Ox4  NAD  Spontaneous breathing on room air with normal respiratory effort  Follows commands x4  PERRLA. EOMI.   Face is symmetrical, tongue slight deviation to left   CN II-XII grossly intact bilat,   No difficulty with speech  Negative pronator drift test  No difficulty with rapid alternating movements  Appropriate muscle tone and sensation intact all four extremities.   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   Case discussed with Dr. Masterson, nurse

## 2018-04-17 NOTE — OP REPORT
DATE OF SERVICE:  04/16/2018    PREOPERATIVE DIAGNOSIS:  Right frontal brain mass in a patient with a history   of breast cancer.    POSTOPERATIVE DIAGNOSES:  Right frontal brain mass in a patient with a history   of breast cancer.    PRINCIPAL PROCEDURE PERFORMED:  Stealth guided resection of a right frontal   brain mass with microdissection.    SURGEON:  Cliff Masterson MD    ASSISTANT:  Gabbie De La Rosa PA-C    ANESTHESIA:  The procedure was performed under general anesthesia by Dr. Ervin Salgado DO    COMPLICATIONS:  There were no complications.    FINDINGS:  Include evidence of a soft grayish mass that was firmer than the   surrounding brain tissue.    For IV fluids, urine output, estimated blood loss, please see the anesthesia   record.    DISPOSITION:  Patient will be extubated and brought to the recovery room.    CLINICAL HISTORY:  The patient is a 67-year-old female who presents with some   dizziness.  The patient had evidence of the brain mass.  She also had history   of right hilar mass.  The patient has a history of breast cancer.  Given the   patient's 4 masses, palliative treatment was recommended.  However, the right   frontal brain mass had significant edema, so surgery was recommended in   addition to radiation.  Risks, benefits, and options were discussed.  The   patient signed written informed consent.    DESCRIPTION OF PROCEDURE:  The patient was brought to the operating room and   placed under general anesthesia.  Perioperative IV antibiotics were given.    The patient was already on Keppra.  Terrazas 3-point pin fixation was achieved   once there was adequate depth of anesthesia.  The head was gently turned to   the left.  The Stealth neuronavigation was registered.  The right frontal area   was shaved, prepped and draped in the usual sterile fashion.  Part of the   pterional incision was made after a time-out was performed.  A self-retaining   retractor was placed.  A craniotomy was  turned.  I used the stealth   neuronavigation and also used the ultrasound to help guide the precise   location in the corticectomy.  I entered through a sulcus to identify the   tumor.  The microscope was brought into view.  I performed a gross total   resection of the mass.  Perfect hemostasis was achieved.  The specimen was   sent for frozen and permanent pathology.  The dura was repaired by using 4-0   Nurolon stitches and Duragen.  The bone flap was replaced by using Leibinger   plating system.  The wound was closed in anatomic layers and a sterile   dressing was applied.       ____________________________________     MD DENISE CLARKE / REY    DD:  04/16/2018 21:38:23  DT:  04/16/2018 22:13:43    D#:  [id number]  Job#:  [id number]

## 2018-04-17 NOTE — OR NURSING
"PATHOLOGY TECHNOLOGIST CALLED OR REGARDING \"BRAIN MASS SPECIMEN B\" .   ALL TISSUE REMOVED DURING THE PROCEDURE WAS TAKEN BY THE PATHOLOGIST.  DR. BELTRAN STATED THAT SHE WOULD DIVIDE THE SPECIMEN INTO 2 SPECIMENS \"A AND B\".  INSTEAD, THE SPECIMENS WERE COMBINED INTO ONE \"SPECIMEN A\" BY THE PATHOLOGIST.  AT HER REQUEST, \"SPECIMEN B\" NOTATION IN THE SPECIMEN RECORD WAS DELETED.  "

## 2018-04-17 NOTE — PROGRESS NOTES
Pt to surgery. Family took pt's plants. Pt belongings at nurses station. To be transferred to pt's mew icu room when bed is known after surgery.

## 2018-04-18 NOTE — PROGRESS NOTES
Pulmonary Critical Care Progress Note        Admit: 4/10/2018  Date of Service:  4/18/2018  Chief Complaint: worsening left arm numbness and weakness with seizure activity    History of Present Illness: 67 y.o. female with the past medical history of left breast cancer s/p lumpectomy and axillary dissection with XRT in 1998 was admitted on 4/10/2018 for worsening left arm numbness/weakness with seizure activity over the past month.  She has had associated light headedness.  Initial work up revealed brain masses with the largest being in the right frontal region as well as a right suprahilar mass to the lungs.  She is in the ICU after Stealth-guided resection of the right frontal brain mass.    Review of Systems   Constitutional: Negative for chills, fever, malaise/fatigue and weight loss.   HENT: Negative for hearing loss, sinus pain, sore throat and tinnitus.    Eyes: Negative for blurred vision, double vision, photophobia and pain.   Respiratory: Negative for cough, hemoptysis and shortness of breath.    Cardiovascular: Negative for chest pain, palpitations, orthopnea, leg swelling and PND.   Gastrointestinal: Negative for constipation, diarrhea, nausea and vomiting.   Genitourinary: Negative.    Musculoskeletal: Negative.    Skin: Negative.    Neurological: Positive for dizziness, focal weakness, seizures and headaches. Negative for tingling, tremors, sensory change, speech change and weakness.   Endo/Heme/Allergies: Negative.    Psychiatric/Behavioral: Negative.        Interval Events:  24 hour interval history reviewed    - no issues overnight   - no change to neuro exam   - HA improved at 3/10   - received ambien for sleep last night   - -140s   - SR 60-90s without ectopy   - no BM yet   - eating well   - adequate UOP with mcghee   - no respiratory issues   - SCDs   - prilosec   - will finishe rocpehin 4/18   - salt tabs at 2 g tid    PFSH:  No change.  Gen: pleasant/cooperative, speaking in full  sentences, alert/active, sitting up in chair, bright and interactive, younger than stated age  HEENT: perrl, eomi, conj: pink, sclera: anicteric, oral: clear pp, mmm, good dentition, hearing intact, lids/lashes: no lesions  Neck: supple with FROM, no cervical adenopathy, no supraclavicular adenopathy, no thyromegaly  EXT: FROM x 4, no axillary adenopathy, no joint erythema/effusion, no clubbing/cyanosis  Skin: warm/dry, no rashes  Respiratory:     Pulse Oximetry: 96 %    Exam: unlabored respirations, no intercostal retractions or accessory muscle use and clear to auscultation without rales or wheezesno chest wall tenderness  ImagingAvailable data reviewed         Invalid input(s): [id number]    HemoDynamics:  Pulse: 68, Heart Rate (Monitored): 68  Arterial BP: 108/93, NIBP: 128/63       Exam: RRR, no murmur, good cap refill, 2+ dpp=, no pedal edema  Imaging: Available data reviewed        Neuro:  GCS Total Destiney Coma Score: 15       Exam: no focal deficits noted mental status intact cranial nerves II through XII intact gait, including heel, toe, and tandem walking normal oriented for age x3 Motor and sensory exam grossly intact  Imaging: Available data reviewed    Fluids:  Intake/Output       04/16/18 0700 - 04/17/18 0659 04/17/18 0700 - 04/18/18 0659 04/18/18 0700 - 04/19/18 0659      7101-7602 7963-5934 Total 9182-4542 1906-5996 Total 5230-7194 5103-2926 Total       Intake    P.O.  --  140 140  1980  400 2380  --  -- --    P.O. --  400 2380 -- -- --    I.V.  --  1625 1625  1096  664 1760  --  -- --    Crystalloid Intake -- 1625 1625 -- -- -- -- -- --    IV Volume (IV Piggyback) -- -- -- 100 -- 100 -- -- --    IV Volume (NS) -- -- --  -- -- --    Total Intake -- 1765 1765 3076 1064 4140 -- -- --       Output    Urine  --  1170 7530  [phone]  --  -- --    Number of Times Voided -- 1 x 1 x -- -- -- -- -- --    Indwelling Cathether -- 1170 1170 [phone] -- -- --    Stool  --   -- --  --  -- --  --  -- --    Number of Times Stooled 0 x 0 x 0 x 0 x 0 x 0 x -- -- --    Blood  --  50 50  --  -- --  --  -- --    Est. Blood Loss (mL) -- 50 50 -- -- -- -- -- --    Total Output -- 1220 1220 2535 850 3385 -- -- --       Net I/O     -- 545 545 541 214 755 -- -- --        Weight: 65.2 kg (143 lb 11.8 oz)  Recent Labs      18   0412  18   0500   SODIUM  134*  135   POTASSIUM  4.4  4.3   CHLORIDE  99  101   CO2  28  25   BUN  21  17   CREATININE  0.69  0.80   MAGNESIUM  2.2  2.0   CALCIUM  9.7  8.8       GI/Nutrition:  Exam: abdomen is soft and non-tender, normal active bowel sounds, without masses or organomegaly  Imaging: Available data reviewed  tolerating regular diet  Liver Function  Recent Labs      18   0412  18   0500   ALTSGPT   --   29   ASTSGOT   --   30   ALKPHOSPHAT   --   61   TBILIRUBIN   --   0.4   GLUCOSE  106*  128*       Heme:  Recent Labs      18   01218   0500   RBC  4.78  4.32   HEMOGLOBIN  16.5*  14.7   HEMATOCRIT  47.3*  43.5   PLATELETCT  302  363       Infectious Disease:  Monitored Temp  Av.2 °C (99 °F)  Min: 36.9 °C (98.4 °F)  Max: 37.6 °C (99.7 °F)  Micro: antibiotics reviewed and cultures reviewed  Recent Labs      18   0500   WBC  11.4*  22.1*   NEUTSPOLYS   --   88.80*   LYMPHOCYTES   --   5.60*   MONOCYTES   --   4.80   EOSINOPHILS   --   0.00   BASOPHILS   --   0.20   ASTSGOT   --   30   ALTSGPT   --   29   ALKPHOSPHAT   --   61   TBILIRUBIN   --   0.4     Current Facility-Administered Medications   Medication Dose Frequency Provider Last Rate Last Dose   • oxyCODONE immediate-release (ROXICODONE) tablet 5 mg  5 mg Q4HRS PRN Dhruv Alonso M.D.   5 mg at 18 1827    Or   • oxyCODONE immediate release (ROXICODONE) tablet 10 mg  10 mg Q4HRS PRN Dhruv Alonso M.D.   10 mg at 18 0136   • hydrALAZINE (APRESOLINE) injection 10-20 mg  10-20 mg Q6HRS PRN Joanne Parada M.D.        • sodium chloride (SALT) tablet 1 g  1 g TID WITH MEALS Joanne Parada M.D.   1 g at 04/17/18 1827   • cefTRIAXone (ROCEPHIN) syringe 2 g  2 g Q24HRS Joanne Parada M.D.       • levETIRAcetam (KEPPRA) 500 mg in  mL IVPB  500 mg Q12HRS Tawana Long M.D.   Stopped at 04/17/18 2102   • Pharmacy Consult Request ...Pain Management Review 1 Each  1 Each PRN HOUSTON GoinsAANTHONY.       • NS infusion   Continuous Dhruv Alonso M.D. 83 mL/hr at 04/17/18 2051     • amLODIPine (NORVASC) tablet 5 mg  5 mg Q DAY Tawana Long M.D.   5 mg at 04/17/18 0908   • dexamethasone (DECADRON) injection 4 mg  4 mg Q6HRS Tawana Long M.D.   4 mg at 04/18/18 0449   • acetaminophen/caffeine/butalbital 325-40-50 mg (FIORICET) -40 MG per tablet 1 Tab  1 Tab Q6HRS PRN Tawana Long M.D.   1 Tab at 04/17/18 2348   • zolpidem (AMBIEN) tablet 5 mg  5 mg HS PRN Sonny Moreno M.D.   5 mg at 04/17/18 2048   • senna-docusate (PERICOLACE or SENOKOT S) 8.6-50 MG per tablet 2 Tab  2 Tab BID Tawana Long M.D.   2 Tab at 04/17/18 1029    And   • polyethylene glycol/lytes (MIRALAX) PACKET 1 Packet  1 Packet QDAY PRN Tawana Long M.D.   1 Packet at 04/14/18 1546    And   • magnesium hydroxide (MILK OF MAGNESIA) suspension 30 mL  30 mL QDAY PRN Tawana Long M.D.   30 mL at 04/14/18 2024    And   • bisacodyl (DULCOLAX) suppository 10 mg  10 mg QDAY PRN Tawana Long M.D.       • nicotine (NICODERM) 14 MG/24HR 14 mg  14 mg Daily-0600 Tawana Long M.D.   14 mg at 04/17/18 0445    And   • nicotine polacrilex (NICORETTE) 2 MG piece 2 mg  2 mg Q HOUR PRN Tawana Long M.D.       • acetaminophen (TYLENOL) tablet 650 mg  650 mg Q6HRS PRN Tawana Long M.D.   650 mg at 04/13/18 1054   • ondansetron (ZOFRAN) syringe/vial injection 4 mg  4 mg Q4HRS PRN Tawana Long M.D.   4 mg at 04/10/18 1453   • labetalol (NORMODYNE,TRANDATE) injection 10 mg  10 mg Q4HRS PRN Joanne Parada M.D.   10 mg at 04/17/18 0752   •  escitalopram (LEXAPRO) tablet 20 mg  20 mg DAILY RYLEE ThompsonDMigdalia   20 mg at 04/17/18 0930   • omeprazole (PRILOSEC) capsule 20 mg  20 mg DAILY RYLEE ThompsonD.   20 mg at 04/17/18 0907   • morphine (pf) 4 mg/ml injection 2 mg  2 mg Q3HRS PRN Sonny Moreno M.D.   2 mg at 04/17/18 2046     Last reviewed on 4/16/2018  8:28 PM by Jeni Lentz R.N.    Quality  Measures:  EKG reviewed and Medications reviewed  Rushing catheter: Critically Ill - Requiring Accurate Measurement of Urinary Output      DVT Prophylaxis: Contraindicated - High bleeding risk  DVT prophylaxis - mechanical: SCDs  Ulcer prophylaxis: Yes  Antibiotics: Treating active infection/contamination beyond 24 hours perioperative coverage      Problems/Plan:  Right frontal brain mass   - s/p craniotomy with Stealth guided resection    - neuro checks now to every 4 hours   - neurosurgery still following   - pathology still pending   - SBP goals < 140 per NSG  Bilateral intraparenchymal masses with surrounding vasogenic edema              - Highly suspicious for metastatic malignancy               - cont Decadron 4 mg IV every 4 hours              - Keppra 500 mg IV every 12 hours--> change to oral today   - neurosurgery still following  Right perihilar mass   - nondiagnostic bronch/biopsies from 4/11   - may need repeat bronchoscopy  Acute Seizure Activity   - due to vasogenic edema and brain masses   - cont keppra and change to oral today  CAP   - s/p bronch/BAL with K. Pneumonia   - cont C3 for 7 days  Hyponatremia              -  Stop NS IV   - cont salt tabs and increase to 2g TID  History of breast cancer in 1998              - Prior right breast lumpectomy and radiation therapy  Hypertension              - Continue amlodipine 5 mg daily              - Strict blood pressure control with goal systolic blood pressure less than 140 mmHg  Dyslipidemia    - cont home statin  GERD with hiatal hernia              - Continue omeprazole  Prophylaxis   -  SCDs, PPI    Pt can be transferred from the ICU when neurosurgery ready.  She will continue to be followed by pulmonary medicine.    Discussed patient condition and risk of morbidity and/or mortality with RN, RT, Therapies, Pharmacy, Dietary, , [facility] resident, Charge nurse / hot rounds, Patient and neurosurgery.    [id number]

## 2018-04-18 NOTE — PROGRESS NOTES
[facility] transfer Summary     Date of Admission: 4/10/2018    Date of Transfer:  4/18/2018   3:17 PM      Admitting Diagnosis: Brain Metastasis     Transfer Diagnosis: Brain Metastasis     Procedures:   Stealth guided resection of a right frontal brain mass with microdissection 4/16/18  Diagnostic Bronchoscopy- right hilar mass 4/11/18    Consultations:   [facility]  Neurosurgery     HPI:   Mrs. coelho is a pleasant 68 yo female with pmhx of left breast cancer treated with lumpectomy and axillary dissection followed by radiation in 1998. She presents with 1 month of intermittent left upper extremity weakness and numbness and 2 episodes of seizure like activity 1 week prior to admission. Brain MRI showed multiple lesions suggestive of brain metastasis. CXR showed Right hilar mass s/p non-diagnostic biopsy. Brain lesion biopsy done 4/16 pending.     Hospital Course:   68 yo female presents with focal neurological deficits and hx of seizure like activity. Found on admission to have right lung mass and several brain lesion concerning for metastasis from a possible primary lung source. Patient underwent diagnostic bronchoscopy which was non-diagnostic. She was then seen by neurosurgery and had removal of 1 left frontal brain mass with microdissection on 4/16. Biopsy results still pending. Patient has been monitored in ICU post craniectomy with blood pressure control with goal of systolic less than 140 and close sodium monitoring with goal of 140-145. She is currently being treated with salt tabs to maintain Na goal, Ceftriaxone for 7 day course s/p craniotomy.  She has being treated with keppra 500 mg Q12 for seizure prophylaxis as well as IV decadron for treated for edema. Patient has no focal neurological deficits post procedure and is tolerating a regular diet. Pain is well controlled with Fioricet and oxycodone PRN. Patient is medically stable for transfer to the floor.     Condition: stable     Disposition: transfer to  neurology     Pending Studies:   Brain mass biopsy       Follow up:   Follow up on biopsy, will need hematology/oncology pending biopsy results. Radiation/oncology aware of case  Continue to monitor Na to maintain goal of 140-145 per neuro surgery recommendations, may need to initiate hypertonic saline if salt tabs unable to effectively reach goal  Blood pressure control, pain management     Gloria Carcamo MD

## 2018-04-18 NOTE — CARE PLAN
Problem: Risk for Impaired Mobility--Activity Intolerance  Goal: Mobilize and/or Transfer Safely with [facility] [place]    Intervention: Pain Management adequate to allow progressive mobilization  Pain has been managed along with BP management.  Per neuro-surg pt is okay to mobilize to commode as long as BP remains under control.  Plan for further mobility tomorrow.      Problem: Pain  Goal: Alleviation of Pain or a reduction in pain to the patient's comfort goal    Intervention: Pain Management--Medications  Pain has been assessed frequently (see flowsheet) with multiple PRN medications available.  Pt reports she feels like her pain is under control.

## 2018-04-18 NOTE — CARE PLAN
Problem: Safety  Goal: Will remain free from injury  Outcome: PROGRESSING AS EXPECTED  Patient educated about safe mobility. Patient verbalizes and demonstrates understanding.     Problem: Knowledge Deficit  Goal: Knowledge of disease process/condition, treatment plan, diagnostic tests, and medications will improve  Outcome: PROGRESSING AS EXPECTED  Patient educated about transfer orders and possibility of moving rooms today. Also educated about possible delays waiting for a room to be available.

## 2018-04-18 NOTE — PROGRESS NOTES
[facility] ICU Progress Note      Admit Date: 4/10/2018  ICU Day: 2    Resident(s): Leroy Gregg  Attending: KIRBY HERRING/ Dr. Joanne Parada M.D.     Date & Time:   4/18/2018   1:57 PM       Patient ID:    Name:             Saadia Crook   YOB: 1950  Age:                 67 y.o.  female   MRN:               [id number]    ID:  [id number] with h/o left breast cancer treated with lumpectomy and axillary dissection followed by radiation Tx in 1998, presenting with 1 month h/o off & on left UE numbness & weakness & 2 episodes of seizure like activity in the last week. Found to have brain mets, most likely 2/2 lung cancer. Bronch w Bx on lung mass was non diagnostic. Pending Brain Bx done April 16       Consultants:  Neurosurgery Dr Masterson  Pulmonary Dr Crandall   PMA:     Interval Events:  Having mild headache at surgical site, well controlled with Fioricet and oxy,  no nausea or vomiting, has good appetite, tolerating regular diet  Bowel Movement today  No neurological deficits, strength equal in upper and lower extemities   Incision is healing well   Ceftriaxone, 7 days, end date 4/19    Okay to remove mcghee  Goal SBP < 140   Maintain Na 140-145,  Na tabs increased to 2 gm TID, recheck BMP at noon   Stable to transfer to floor, order in    Review of Systems   Constitutional: Negative for fever.   Eyes: Negative for pain.   Respiratory: Negative for sputum production.    Cardiovascular: Negative for chest pain.   Gastrointestinal: Negative for abdominal pain and melena.   Genitourinary: Negative for dysuria.   Musculoskeletal: Negative for falls.   Neurological: Positive for headaches. Negative for focal weakness and seizures.     Neuro  Alert, oriented x 4  Follows commands x4  PERRLA. EOMI.   No focal weakness   Power 5/5 bilaterally upper and lower extremities     Psych: Affect, mood, judgement normal.    Respiratory:     Respiration: 14, Pulse Oximetry: 95 %  Clear to auscultation good effort    Chest Tube Drains:          Invalid input(s): [id number]    HemoDynamics:  Pulse: 78, Heart Rate (Monitored): 79 Arterial BP: 115/98, NIBP: 102/59      Fluids:    Date 18 0700 - 18 0659   Shift 3819-0713 4766-1705 7907-2383 24 Hour Total   I  N  T  A  K  E   P.O. 600   600      P.O. 600   600    I.V. 598   598      IV Volume (IV Piggyback) 100   100      IV Volume (NS) 498   498    Shift Total 1198   1198   O  U  T  P  U  T   Urine 1050   1050      Indwelling Cathether 950   950      Void (ml) 100   100    Stool          Number of Times Stooled 1 x   1 x    Shift Total 1050   1050      148    NS at 83 ml/hr    Intake/Output Summary (Last 24 hours) at 18 0834  Last data filed at 18 0700   Gross per 24 hour   Intake             1765 ml   Output             1370 ml   Net              395 ml          Body mass index is 21.86 kg/m².    Recent Labs      18   0412  18   0500  18   0500  18   1215   SODIUM  134*  135  135  138   POTASSIUM  4.4  4.3  4.1  3.6   CHLORIDE  99  101  103  110   CO2  28  25  24  22   BUN  21  17  10  11   CREATININE  0.69  0.80  0.40*  0.43*   MAGNESIUM  2.2  2.0   --    --    CALCIUM  9.7  8.8  8.6  7.5*       GI/Nutrition: Regular diet   Recent Labs      18   0500  18   0500  18   1215   ALTSGPT  29   --    --    ASTSGOT  30   --    --    ALKPHOSPHAT  61   --    --    TBILIRUBIN  0.4   --    --    GLUCOSE  128*  117*  108*       Heme:  Recent Labs      18   0126  18   0500  18   0500   RBC  4.78  4.32  3.92*   HEMOGLOBIN  16.5*  14.7  13.3   HEMATOCRIT  47.3*  43.5  39.0   PLATELETCT  302  363  326       Infectious Disease:  Monitored Temp  Av.3 °C (99.2 °F)  Min: 36.9 °C (98.5 °F)  Max: 37.6 °C (99.7 °F)  Temp  Av.2 °C (98.9 °F)  Min: 37.2 °C (98.9 °F)  Max: 37.2 °C (98.9 °F)  Recent Labs      18   0126  18   0500  18   0500   WBC  11.4*  22.1*  16.7*   NEUTSPOLYS   --    88.80*  83.80*   LYMPHOCYTES   --   5.60*  6.10*   MONOCYTES   --   4.80  8.80   EOSINOPHILS   --   0.00  0.10   BASOPHILS   --   0.20  0.20   ASTSGOT   --   30   --    ALTSGPT   --   29   --    ALKPHOSPHAT   --   61   --    TBILIRUBIN   --   0.4   --        Meds:  • heparin  5,000 Units     • sodium chloride  2 g     • levETIRAcetam  500 mg     • oxyCODONE immediate-release  5 mg      Or   • oxyCODONE immediate-release  10 mg     • hydrALAZINE  10-20 mg     • cefTRIAXone (ROCEPHIN) IV  2 g     • Pharmacy Consult Request  1 Each     • amLODIPine  5 mg     • dexamethasone  4 mg     • acetaminophen/caffeine/butalbital 325-40-50 mg  1 Tab     • zolpidem  5 mg     • senna-docusate  2 Tab      And   • polyethylene glycol/lytes  1 Packet      And   • magnesium hydroxide  30 mL      And   • bisacodyl  10 mg     • nicotine  14 mg      And   • nicotine polacrilex  2 mg     • acetaminophen  650 mg     • ondansetron  4 mg     • labetalol  10 mg     • escitalopram  20 mg     • omeprazole  20 mg     • morphine injection  2 mg            Brain metastasis (CMS-HCC)  4 metastasis in brain with right frontal lobe mass being the largest. Yet unknown primary. Possibly lung vs breast cancer.  Biopsy of hilar mass: negative for malignancy; cytology negative for malignancy  S/p craniotomy April 16, 2018 PM  Plan:  - Continue decadron to reduce mass effect  - Aspiration, seizure, fall precautions  - Neuro checks Q4H    Intracranial edema (CMS-HCC)  Secondary to brain metastasis  Plan:  - Continue IV decadron 4mg Q6hrs.  - Labetalol PRN for BP   - Goal SBP < 140  - Na goal 140-145 salt tablets TID, consider hypertonic saline if not at goal  - BMP Q12 Hr      Seizure (CMS-HCC)  Likely secondary to metastatic lesions in brain.  Plan:  - Keppra 500 mg Q12H  - Neuro checks Q4H  - Aspiration, seizure, fall precautions    Hilar mass  Identified on CT of the thorax while evaluating for primary of brain metastasis.  Bronchoscopy was done on 4/11,  biopsy negative for malignancy; cytology was negative for malignancy. Biopsy was significant for reactive changes/reactive atypia.  S/p craniotomy 4/16/2018   Pathology Pending result  Heme/onc consult pending results     Angiolipoma of right kidney  Stable. No need for intervention right now.     Adrenal nodule (CMS-HCC)  Stable since previous imaging.     Essential hypertension  Currently controlled. On lisinopril 20 mg as outpatient however due to increasing K+, was discontinued while inpatient  SBP goal < 140    CAP (community acquired pneumonia) due to Klebsiella pneumoniae (CMS-HCC)  Bronchial washing significant for E. Coli, that is resistant to Ampicillin and bactrim.  - Treat with IV ceftriaxone to complete 7 days (end date 4/19)    Diverticulosis  Asymptomatic. Stable   Having some constipation, senna prn       Quality Measures:  DVT Prophylaxis: SCDs  Ulcer Prophylaxis: PPI  Antibiotics: Ceftriaxone   Lines: PIV    DISPO: stable to transfer to wards     CODE STATUS: FULL

## 2018-04-18 NOTE — PROGRESS NOTES
Neurosurgery Progress Note    Subjective:  No neuro changes overnight.   Rushing in  Was able to ambulate yesterday with nursing to commode.   Na 135 this AM   Pain well controlled on oral medications  Denies new pain, numbness, weakness.   Denies HA, positional HA, N/V, changes in vision, dizziness, chest pain, SOB, difficulty breathing, chills    Exam:  Alert, oriented x 4, NAD  Normal respiratory effort, supplemental O2 via NC   Follows commands x4  PERRLA. EOMI.   Face is symmetrical, tongue slight deviation to left   CN II-XII grossly intact bilat,   No difficulty with speech  Trace left pronator drift   No difficulty with rapid alternating movements, no DDK, past-pointing   Appropriate muscle tone and sensation intact all four extremities.   /5 strength BLE/BUE    Pulse  Av.1  Min: 64  Max: 85  Resp  Av.5  Min: 13  Max: 46  Monitored Temp  Av.3 °C (99.1 °F)  Min: 36.9 °C (98.5 °F)  Max: 37.6 °C (99.7 °F)  SpO2  Av.9 %  Min: 93 %  Max: 98 %    No Data Recorded    Recent Labs      18   0126  18   0500  18   0500   WBC  11.4*  22.1*  16.7*   RBC  4.78  4.32  3.92*   HEMOGLOBIN  16.5*  14.7  13.3   HEMATOCRIT  47.3*  43.5  39.0   MCV  99.0*  100.7*  99.5*   MCH  34.5*  34.0*  33.9*   MCHC  34.9  33.8  34.1   RDW  45.4  46.8  45.5   PLATELETCT  302  363  326   MPV  10.6  9.2  9.7     Recent Labs      18   0412  18   0500  18   0500   SODIUM  134*  135  135   POTASSIUM  4.4  4.3  4.1   CHLORIDE  99  101  103   CO2  28  25  24   GLUCOSE  106*  128*  117*   BUN  21  17  10   CREATININE  0.69  0.80  0.40*   CALCIUM  9.7  8.8  8.6               Intake/Output       18 0700 - 18 0659 18 0700 - 18 0659      1535-9265 6655-4119 Total 1756-5255 1822-6653 Total       Intake    P.O.    500 2480  --  -- --    P.O. 2659 518 6810 -- -- --    I.V.  [phone]  --  -- --    IV Volume (IV Piggyback) 100 -- 100 -- -- --    IV Volume (NS) 742 690  1992 -- -- --    Total Intake 3076 1496 4572 -- -- --       Output    Urine  2535  1700 4235  --  -- --    Indwelling Cathether 2535 1700 4235 -- -- --    Stool  --  -- --  --  -- --    Number of Times Stooled 0 x 0 x 0 x -- -- --    Total Output 2535 1700 4235 -- -- --       Net I/O     541 -204 337 -- -- --            Intake/Output Summary (Last 24 hours) at 04/18/18 0822  Last data filed at 04/18/18 0600   Gross per 24 hour   Intake             4246 ml   Output             3635 ml   Net              611 ml            • oxyCODONE immediate-release  5 mg Q4HRS PRN    Or   • oxyCODONE immediate-release  10 mg Q4HRS PRN   • hydrALAZINE  10-20 mg Q6HRS PRN   • sodium chloride  1 g TID WITH MEALS   • cefTRIAXone (ROCEPHIN) IV  2 g Q24HRS   • levETIRAcetam (KEPPRA) IV  500 mg Q12HRS   • Pharmacy Consult Request  1 Each PRN   • NS   Continuous   • amLODIPine  5 mg Q DAY   • dexamethasone  4 mg Q6HRS   • acetaminophen/caffeine/butalbital 325-40-50 mg  1 Tab Q6HRS PRN   • zolpidem  5 mg HS PRN   • senna-docusate  2 Tab BID    And   • polyethylene glycol/lytes  1 Packet QDAY PRN    And   • magnesium hydroxide  30 mL QDAY PRN    And   • bisacodyl  10 mg QDAY PRN   • nicotine  14 mg Daily-0600    And   • nicotine polacrilex  2 mg Q HOUR PRN   • acetaminophen  650 mg Q6HRS PRN   • ondansetron  4 mg Q4HRS PRN   • labetalol  10 mg Q4HRS PRN   • escitalopram  20 mg DAILY   • omeprazole  20 mg DAILY   • morphine injection  2 mg Q3HRS PRN       Assessment:   Hospital day #8: 67-year-old female with a history of breast cancer. Probable breast cancer metastases to the brain and lung.  POD#2 Stealth guided resection of a right frontal brain mass with microdissection  CT Head yesterday reviewed, expected post-surgical changes without evidence of complication, hemorrhage, no midline shift   Pt is neuro intact and doing well this AM  Prophylactic anticoagulation: yes, ok for Heparin SQ.    Plan:  Recommend SBP < 140  Ok for Q4H vital  signs and neuro checks  OK for diet  OK to continue increasing mobilization. Mobilize up to chair, ok to mobilize around unit with PT/OT, nursing if symptomatically tolerated and if BP controlled.  D/c mcghee this am  Continue ICU observation this am, ok to transfer to floor this afternoon if Na is closer to goal. Will check BMP at noon.   Continue Keppra 500 mg Q12H  Continue Decadron 4mg Q6H IV  Final pathology pending. Dr. Wall with radiation/oncology is aware of case, will await final pathology results.   She is cleared for further workup of lung mass per medicine including lung bx.   Maintain Na 140-145, increase salt tabs to 2g TID.     Case discussed with patient, RN, Dr. Masterson.

## 2018-04-19 NOTE — THERAPY
"Occupational Therapy Treatment completed with focus on ADLs and ADL transfers.  Functional Status:  SBA functional transfers, SPV LB dressing and grooming seated  Plan of Care: Will benefit from Occupational Therapy 3 times per week  Discharge Recommendations:  Equipment Will Continue to Assess for Equipment Needs. Post-acute therapy Discharge to home with outpatient or home health for additional skilled therapy services.    See \"Rehab Therapy-Acute\" Patient Summary Report for complete documentation.     Pt seen for OT tx on this date. She appears to have decreased balance and activity tolerance s/p crani which impacts her ability to participate in BADLs indicating a continued need for Acute OT services. Recommend DC home w/ home health or outpt for  additional skilled therapy services.     "

## 2018-04-19 NOTE — CONSULTS
RADIATION ONCOLOGY CONSULT    DATE OF SERVICE: 4/19/2018    IDENTIFICATION:   Brain metastasis, four lesions identified right frontal, left parietal, left posterior inferior occipital, left anterior inferior occipital; status post craniotomy on April 16, 2018 of the dominant right frontal lesion; path favoring breast primary; remote history of breast cancer treated in 1998 with lumpectomy sentinel lymph node biopsy followed by adjuvant radiotherapy no chemotherapy required    HISTORY OF PRESENT ILLNESS: I had the pleasure of seeing Ms. Crook today in consultation at the request of Dr. Masterson for her brain metastasis. Patient is a 67-year-old woman who presented to the emergency room with loss of consciousness that sounds consistent with seizure activity. In hindsight she states she's had at least a one-month history of left upper extremity numbness and weakness as well as some subtle mental status change. But on the day of admission she lost complete consciousness and had involuntary defecation and was felt to have suffered seizure activity. As a part of her workup MRI of the brain showed 4 lesions concerning for metastatic foci. The dominant lesion was in the right frontal lobe measuring 2.2 cm the other 3 lesions were subcentimeter in size. The remainder of her workup also revealed a roughly 4 cm right hilar mass, 9 mm right minor fissure mass, bilateral subcentimeter pulmonary nodules, tiny hepatic lesion, bilateral adrenal masses, and 1cm retroperitoneal lymph node. On review of previous imaging some of these are stable in nature the pulmonary disease however appears new. She had attempt at bronchoscopic guided biopsy of the lung that did not yield any malignancy. Resection of the brain however is consistent with a poorly differentiated adenocarcinoma. The lesion is ER/SD negative the HER-2/lex status is pending TTF-1 is negative MARYJANE 3 was positive. This profile is suggestive of breast primary. Abdomen asked to  see her for consideration of radiosurgery for her brain disease.    PAST MEDICAL HISTORY:   Past Medical History:   Diagnosis Date   • Cancer (CMS-HCC) 1995    breast ca   • Diverticulitis    • Essential hypertension, benign 6/15/2016   • Hiatus hernia syndrome    • Indigestion    • Kidney stones    • Peptic ulcer     small antral ulcer found on EGD 2011       PAST SURGICAL HISTORY:  Past Surgical History:   Procedure Laterality Date   • CRANIOTOMY STEALTH Right 4/16/2018    Procedure: CRANIOTOMY STEALTH-FRONTAL;  Surgeon: Cliff Masterson M.D.;  Location: SURGERY [facility];  Service: Neurosurgery   • BRONCHOSCOPY-ENDO N/A 4/11/2018    Procedure: BRONCHOSCOPY-ENDO;  Surgeon: Ken Crandall M.D.;  Location: ENDOSCOPY [facility];  Service: Pulmonary   • GASTROSCOPY WITH BIOPSY  10/11/2011    Performed by TASHIA CRAIG at ENDOSCOPY [facility]   • COLONOSCOPY WITH POLYP  10/11/2011    Performed by TASHIA CRAIG at ENDOSCOPY [facility]   • LUMPECTOMY  1978    right   • ABDOMINAL EXPLORATION     • ABDOMINAL HYSTERECTOMY TOTAL      BSO   • APPENDECTOMY     • GYN SURGERY      hysterectomy   • OTHER      tonsilectomy   • TONSILLECTOMY AND ADENOIDECTOMY         CURRENT MEDICATIONS:  No current facility-administered medications for this encounter.      No current outpatient prescriptions on file.     Facility-Administered Medications Ordered in Other Encounters   Medication Dose Route Frequency Provider Last Rate Last Dose   • heparin injection 5,000 Units  5,000 Units Subcutaneous Q8HRS [place] PatriciaA.-C.   5,000 Units at 04/19/18 0529   • sodium chloride (SALT) tablet 2 g  2 g Oral TID WITH MEALS Khloe Shaffer PMigdaliaA.-C.   2 g at 04/19/18 0814   • levETIRAcetam (KEPPRA) tablet 500 mg  500 mg Oral BID Joanne Parada M.D.   500 mg at 04/19/18 0815   • oxyCODONE immediate-release (ROXICODONE) tablet 5 mg  5 mg Oral Q4HRS PRN Dhruv Alonso M.D.   5 mg at 04/17/18 1827    Or   • oxyCODONE  immediate release (ROXICODONE) tablet 10 mg  10 mg Oral Q4HRS PRN Dhruv Alonso M.D.   10 mg at 04/19/18 0914   • hydrALAZINE (APRESOLINE) injection 10-20 mg  10-20 mg Intravenous Q6HRS PRN Joanne Parada M.D.       • cefTRIAXone (ROCEPHIN) syringe 2 g  2 g Intravenous Q24HRS Joanne Parada M.D.   2 g at 04/18/18 0905   • Pharmacy Consult Request ...Pain Management Review 1 Each  1 Each Other PRN Gabbie De La Rosa P.A.-C.       • amLODIPine (NORVASC) tablet 5 mg  5 mg Oral Q DAY Tawana Long M.D.   5 mg at 04/19/18 0814   • dexamethasone (DECADRON) injection 4 mg  4 mg Intravenous Q6HRS Tawana Long M.D.   4 mg at 04/19/18 0530   • acetaminophen/caffeine/butalbital 325-40-50 mg (FIORICET) -40 MG per tablet 1 Tab  1 Tab Oral Q6HRS PRN Tawana Long M.D.   1 Tab at 04/19/18 0113   • zolpidem (AMBIEN) tablet 5 mg  5 mg Oral HS PRN Sonny Moreno M.D.   5 mg at 04/18/18 2146   • senna-docusate (PERICOLACE or SENOKOT S) 8.6-50 MG per tablet 2 Tab  2 Tab Oral BID Tawana Long M.D.   2 Tab at 04/17/18 1029    And   • polyethylene glycol/lytes (MIRALAX) PACKET 1 Packet  1 Packet Oral QDAY PRN Tawana Long M.D.   1 Packet at 04/14/18 1546    And   • magnesium hydroxide (MILK OF MAGNESIA) suspension 30 mL  30 mL Oral QDAY PRN Tawana Long M.D.   30 mL at 04/14/18 2024    And   • bisacodyl (DULCOLAX) suppository 10 mg  10 mg Rectal QDAY PRN Tawana Long M.D.       • nicotine (NICODERM) 14 MG/24HR 14 mg  14 mg Transdermal Daily-0600 Tawana Long M.D.   14 mg at 04/17/18 0445    And   • nicotine polacrilex (NICORETTE) 2 MG piece 2 mg  2 mg Oral Q HOUR PRN Tawana Long M.D.       • acetaminophen (TYLENOL) tablet 650 mg  650 mg Oral Q6HRS PRN Tawana Long M.D.   650 mg at 04/13/18 1054   • ondansetron (ZOFRAN) syringe/vial injection 4 mg  4 mg Intravenous Q4HRS PRN Tawana Long M.D.   4 mg at 04/10/18 1453   • labetalol (NORMODYNE,TRANDATE) injection 10 mg  10 mg Intravenous Q4HRS PRN  Joanne Parada M.D.   10 mg at 04/17/18 0752   • escitalopram (LEXAPRO) tablet 20 mg  20 mg Oral DAILY Tawana Long M.D.   20 mg at 04/19/18 0815   • omeprazole (PRILOSEC) capsule 20 mg  20 mg Oral DAILY Tawana Long M.D.   20 mg at 04/19/18 0814   • morphine (pf) 4 mg/ml injection 2 mg  2 mg Intravenous Q3HRS PRN Sonny Moreno M.D.   2 mg at 04/18/18 1108       ALLERGIES:    Patient has no known allergies.    FAMILY HISTORY:    Family History   Problem Relation Age of Onset   • Cancer Mother      breast   • Hypertension Mother    • Stroke Father    • Heart Attack Neg Hx    • Heart Disease Neg Hx    • Heart Failure Neg Hx        SOCIAL HISTORY:    Social History   Substance Use Topics   • Smoking status: Current Every Day Smoker     Packs/day: 0.25     Years: 30.00     Types: Cigarettes   • Smokeless tobacco: Never Used   • Alcohol use 1.2 oz/week     2 Cans of beer per week     REVIEW OF SYSTEMS:  A complete review of systems was completed in patient's EPIC chart on 4/19/2018.  All are negative with relationship to this diagnosis with the exception of:  No seizure activity since surgery, alert and oriented to person place and situation, no obvious neuro cognitive deficits    PHYSICAL EXAM:    Vitals per inpatient chart  GENERAL: No apparent distress.  HEENT:  Pupils are equal, round, and reactive to light.  Extraocular muscles   are intact. Sclerae nonicteric.  Conjunctivae pink.  Oral cavity, tongue   protrudes midline.   NECK:  Supple without evidence of thyromegaly.  NODES:  No peripheral adenopathy of the neck, supraclavicular fossa or axillae   bilaterally.  LUNGS:  Clear to ascultation bilaterally   HEART:  Regular rate and rhythm.  No murmur appreciated  ABDOMEN:  Soft. No evidence of hepatosplenomegaly.  Positive bowel sounds.  EXTREMITIES:  Without Edema.  NEUROLOGIC:  Cranial nerves II through XII were intact. Normal stance and gait motor and sensory grossly within normal limits right frontal  craniotomy scar      IMPRESSION:   Brain metastasis, four lesions identified right frontal, left parietal, left posterior inferior occipital, left anterior inferior occipital; status post craniotomy on April 16, 2018 of the dominant right frontal lesion; path favoring breast primary; remote history of breast cancer treated in 1998 with lumpectomy sentinel lymph node biopsy followed by adjuvant radiotherapy no chemotherapy required      RECOMMENDATIONS:   I discussed the diagnosis, prognosis, and treatment options over a 1 hr 10 min time period, 95% of that time dedicated to ongoing treatment management. Patient's daughter was present also help her with the information. She is doing remarkably well in the postcraniotomy setting and is alert person place and situation and able to understand and consent to all information.    I discussed with the patient her brain pathology is consistent with a metastatic carcinoma. More specifically a poorly differentiated adenocarcinoma. I did discuss however both lung primary and breast cancer would have this histologic morphology. The tumor was estrogen and progesterone receptor negative, HER-2/lex status is being run, TTF1 was negative, and the only distinguishing immunohistochemistry was GATA3. This profile which favor breast but certainly leaves some clinical questions open. This is true especially given her current lung scan and the remote nature of her breast cancer. However the current working theory is breast cancer spread to brain. 1st I discussed the importance of continuing with her lung workup to either confirm metastatic breast cancer, rule out 2nd primary lung cancer, or raise suspicion of lung cancer spread to brain. Confirmation of her lung disease would have meaningful significance to the medical oncology team.    Regarding her brain disease. I described that there are certain circumstances where withholding brain radiation in lieu of systemic therapy with good  blood brain barrier penetration is utilized. This is not done for breast cancer. I am currently recommending moving forward with stereotactic radiosurgery to the postoperative bed in the 3 additional lesions. Even if her lung biopsies finally confirm primary lung cancer there will still be clinical question as to lung cancer spread to brain or breast cancer spread to brain given the current pathology report from the brain. Secondarily there is currently not any lung pathology showing cancer in the specialized panel of markers would take at minimum 3 weeks 2 process. For these reasons I am recommending proceeding with stereotactic radiosurgery. Patient and daughter were in agreement with that treatment plan. I have recommended to the hospitalist team a formal medical oncology evaluation. I have ordered a thin cut radiosurgery MRI for planning purposes. I have given her a simulation card for radiation oncology for April 30 at 10:30 AM. My plan is to treat the postoperative bed in 3 fractions to 2700 cGy. The 3 treatment naïve lesions will be treated and a single fraction of 2000 cGy each.    We discussed the risks, benefits and side effects of treatment and the patient is amenable to treatment.  If patient has any questions or concerns, she should feel free to contact me.    Thank you for the opportunity to participate in her care.  If any questions or comments, please do not hesitate in calling.

## 2018-04-19 NOTE — PROGRESS NOTES
Neurosurgery Progress Note    Subjective:  No neuro changes overnight.   Transferred out of ICU  Voiding  Na 133 this AM   HA, incisional pain well controlled on scheduled oral medications  Denies new pain, numbness, weakness.   Denies HA, positional HA, N/V, changes in vision, dizziness, chest pain, SOB, difficulty breathing, chills  Made aware of final pathology results this AM by another team     Exam:  Alert, oriented x 4, NAD  Normal respiratory effort, RA  Follows commands x4  PERRLA. EOMI.   Face is symmetrical, tongue slight deviation to left   CN II-XII grossly intact bilat,   No difficulty with speech  Trace left pronator drift   No difficulty with rapid alternating movements, no DDK, past-pointing   Appropriate muscle tone and sensation intact all four extremities.   5/5 strength BLE/BUE  Incision CDI     BP  Min: 114/78  Max: 127/68  Pulse  Av.1  Min: 67  Max: 78  Resp  Av.4  Min: 10  Max: 63  Temp  Av.9 °C (98.5 °F)  Min: 36.7 °C (98.1 °F)  Max: 37.2 °C (98.9 °F)  Monitored Temp  Av.6 °C (99.6 °F)  Min: 37.4 °C (99.3 °F)  Max: 37.6 °C (99.7 °F)  SpO2  Av.8 %  Min: 92 %  Max: 99 %    No Data Recorded    Recent Labs      18   0500  18   0500  18   0445   WBC  22.1*  16.7*  14.0*   RBC  4.32  3.92*  3.88*   HEMOGLOBIN  14.7  13.3  13.4   HEMATOCRIT  43.5  39.0  38.4   MCV  100.7*  99.5*  99.0*   MCH  34.0*  33.9*  34.5*   MCHC  33.8  34.1  34.9   RDW  46.8  45.5  45.8   PLATELETCT  363  326  340   MPV  9.2  9.7  9.6     Recent Labs      18   0500  18   1215  18   0445   SODIUM  135  138  133*   POTASSIUM  4.1  3.6  4.3   CHLORIDE  103  110  100   CO2  24  22  27   GLUCOSE  117*  108*  119*   BUN  10  11  14   CREATININE  0.40*  0.43*  0.60   CALCIUM  8.6  7.5*  8.9               Intake/Output       18 - 18 0659 18 - 18 Total  Total       Intake    P.O.  800  150  950  --  -- --    P.O. 800 150 950 -- -- --    I.V.  764  0 764  --  -- --    IV Volume (IV Piggyback) 100 -- 100 -- -- --    IV Volume (NS) 664 0 664 -- -- --    Total Intake 2875 087 9686 -- -- --       Output    Urine  1795  1475 3270  200  -- 200    Indwelling Cathether 950 -- 950 -- -- --    Void (ml) 845 1475 2320 200 -- 200    Stool  --  -- --  --  -- --    Number of Times Stooled 1 x 0 x 1 x -- -- --    Total Output 1795 1475 3270 200 -- 200       Net I/O     -231 -1325 -1556 -200 -- -200            Intake/Output Summary (Last 24 hours) at 04/19/18 0826  Last data filed at 04/19/18 0800   Gross per 24 hour   Intake             1248 ml   Output             3020 ml   Net            -1772 ml            • heparin  5,000 Units Q8HRS   • sodium chloride  2 g TID WITH MEALS   • levETIRAcetam  500 mg BID   • oxyCODONE immediate-release  5 mg Q4HRS PRN    Or   • oxyCODONE immediate-release  10 mg Q4HRS PRN   • hydrALAZINE  10-20 mg Q6HRS PRN   • cefTRIAXone (ROCEPHIN) IV  2 g Q24HRS   • Pharmacy Consult Request  1 Each PRN   • amLODIPine  5 mg Q DAY   • dexamethasone  4 mg Q6HRS   • acetaminophen/caffeine/butalbital 325-40-50 mg  1 Tab Q6HRS PRN   • zolpidem  5 mg HS PRN   • senna-docusate  2 Tab BID    And   • polyethylene glycol/lytes  1 Packet QDAY PRN    And   • magnesium hydroxide  30 mL QDAY PRN    And   • bisacodyl  10 mg QDAY PRN   • nicotine  14 mg Daily-0600    And   • nicotine polacrilex  2 mg Q HOUR PRN   • acetaminophen  650 mg Q6HRS PRN   • ondansetron  4 mg Q4HRS PRN   • labetalol  10 mg Q4HRS PRN   • escitalopram  20 mg DAILY   • omeprazole  20 mg DAILY   • morphine injection  2 mg Q3HRS PRN       Assessment:   Hospital day #9: 67-year-old female with a history of breast cancer. Final path favors breast primary adenocarcinoma  POD#3 Stealth guided resection of a right frontal brain mass with microdissection  Doing OK this am, processing news of pathology  Prophylactic anticoagulation: yes, ok for  Heparin SQ.    Plan:  Recommend SBP < 140  Continue Q4H neuro/vitals   Discussed case with Dr. Goldy Wall, rad onc yesterday   Ok for radiation tx 3 weeks post op   If indicated, OK for further workup of hilar mass per medicine/oncology recommendations   Ambulate, mobilize  Continue Keppra 500 mg Q12H  Will change decadron to 4mg Q8H x 2-3 days, then BID   Maintain Na 140-145, increase salt tabs to 2g TID.     Case discussed with patient, RN, Dr. Masterson.  Appreciate all teams involved in the care of this patient

## 2018-04-19 NOTE — CARE PLAN
"Problem: Safety  Goal: Free from accidental injury  Pt has been encouraged to call if she needs to get out of bed or use commode.  Pt verbalizes understanding at this time.  Fall preventative measures in place.      Problem: Pain  Goal: Alleviation of Pain or a reduction in pain to the patient's comfort goal    Intervention: Pain Management--Medications  Pt reports pain is the best it has been since surgery- she states pain went from more \"stabbing\" to an \"ache\".  Fioricet and oxycodone have been alternated to control pain.  PRN morphine pushes available if break through pain occurs.        "

## 2018-04-19 NOTE — PROGRESS NOTES
Pt transported to neuro by transport.  All belongings with patient including dentures, phone and computer with .  Report was given to Brandy.  No further needs at this time.

## 2018-04-19 NOTE — PROGRESS NOTES
Internal Medicine Interval Note  Note Author: Tawana Long M.D.     Name Saadia Crook     1950   Age/Sex 67 y.o. female   MRN [id number]   Code Status Full Code     After 5PM or if no immediate response to page, please call for cross-coverage  Attending/Team: Dr. Bowens See Patient List for primary contact information  Call [phone] to page    1st Call - Day Intern (R1):   Dr. Long 2nd Call - Day Sr. Resident (R2/R3):   Dr. Bates         Reason for interval visit  (Principal Problem)   Brain metastasis (CMS-HCC)    Interval Problem Daily Status Update  (24 hours)   No overnight events. Patient transferred out of ICU. Headache well controlled with current pain regimen. Denies nausea/vomiting. Intermittent blurry vision.     Today, we discussed patient's biopsy results and plan of moving forward. Dr. Wall on board for radiation oncology and Dr. Noe (oncology) was consulted as well. After discussing with all consultants involved, plan will be to move forward with additional pathological testing of brain met tissue.       Review of Systems   Constitutional: Negative for chills and fever.   HENT: Negative for ear pain, nosebleeds and sore throat.    Eyes: Positive for blurred vision.        Intermittent blurry vision   Respiratory: Negative for cough, sputum production and shortness of breath.    Cardiovascular: Negative for chest pain, palpitations and leg swelling.   Gastrointestinal: Negative for nausea and vomiting.   Genitourinary: Negative for dysuria and hematuria.   Musculoskeletal: Negative for myalgias and neck pain.   Skin: Negative for itching and rash.   Neurological: Positive for headaches. Negative for dizziness, focal weakness, seizures and weakness.        Controlled with pain medication   Psychiatric/Behavioral: Negative for memory loss. The patient does not have insomnia.        Consultants/Specialty  Neurosurgery: Dr Masterson  Pulmonary: Dr Crandall (Signed  off)  Rad Onc: Dr. Wall   Oncology: Dr. Noe    Disposition  Inpatient for further evaluation of primary cancer, s/p craniotomy for brain mets    Quality Measures  Quality-Core Measures   Reviewed items::  Radiology images reviewed, Labs reviewed and Medications reviewed  Rushing catheter::  No Rushing  DVT prophylaxis pharmacological::  Heparin  Ulcer Prophylaxis::  No  : Ceftriaxone (Day 7 - last day)          Physical Exam       Vitals:    04/18/18 2241 04/19/18 0400 04/19/18 0800 04/19/18 1200   BP: 121/68 127/68 114/78 126/70   Pulse: 77 76 71 76   Resp: (!) 24 16 17 19   Temp: 36.7 °C (98.1 °F) 36.9 °C (98.5 °F) 36.7 °C (98.1 °F) 37.1 °C (98.7 °F)   SpO2: 92% 93% 93% 98%   Weight:       Height:         Body mass index is 21.86 kg/m².    Oxygen Therapy:  Pulse Oximetry: 98 %, O2 (LPM): 0, O2 Delivery: None (Room Air)    Physical Exam   Constitutional: She is oriented to person, place, and time and well-developed, well-nourished, and in no distress. No distress.   HENT:   Head: Normocephalic and atraumatic.   Mouth/Throat: No oropharyngeal exudate.   Right scalp incision clean. Wound healing appropriately   Eyes: EOM are normal. Pupils are equal, round, and reactive to light. No scleral icterus.   Neck: Normal range of motion. Neck supple.   Cardiovascular: Normal rate, regular rhythm and normal heart sounds.    Pulmonary/Chest: Effort normal and breath sounds normal. No stridor. No respiratory distress. She has no wheezes. She has no rales.   Breast exam performed in presence of RN with patient's permission, essentially within normal limits   Abdominal: Soft. Bowel sounds are normal. There is no tenderness. There is no rebound.   Musculoskeletal: Normal range of motion. She exhibits no edema.   Neurological: She is alert and oriented to person, place, and time. No cranial nerve deficit. She exhibits normal muscle tone. Coordination normal. GCS score is 15.   Skin: Skin is warm and dry. No rash noted. No  "erythema.   Psychiatric: Mood, memory, affect and judgment normal.   Nursing note and vitals reviewed.        Lab Data Review:   4/19/2018  11:52 AM    Recent Labs      04/17/18   0500   04/18/18   1215  04/19/18   0445  04/19/18   0851   SODIUM  135   < >  138  133*  134*   POTASSIUM  4.3   < >  3.6  4.3  3.9   CHLORIDE  101   < >  110  100  101   CO2  25   < >  22  27  25   BUN  17   < >  11  14  12   CREATININE  0.80   < >  0.43*  0.60  0.57   MAGNESIUM  2.0   --    --    --    --    CALCIUM  8.8   < >  7.5*  8.9  9.4    < > = values in this interval not displayed.       Recent Labs      04/17/18   0500   04/18/18   1215  04/19/18   0445  04/19/18   0851   ALTSGPT  29   --    --    --    --    ASTSGOT  30   --    --    --    --    ALKPHOSPHAT  61   --    --    --    --    TBILIRUBIN  0.4   --    --    --    --    GLUCOSE  128*   < >  108*  119*  105*    < > = values in this interval not displayed.       Recent Labs      04/17/18   0500  04/18/18   0500  04/19/18   0445   RBC  4.32  3.92*  3.88*   HEMOGLOBIN  14.7  13.3  13.4   HEMATOCRIT  43.5  39.0  38.4   PLATELETCT  363  326  340       Recent Labs      04/17/18   0500  04/18/18   0500  04/19/18   0445   WBC  22.1*  16.7*  14.0*   NEUTSPOLYS  88.80*  83.80*  82.40*   LYMPHOCYTES  5.60*  6.10*  9.40*   MONOCYTES  4.80  8.80  6.40   EOSINOPHILS  0.00  0.10  0.00   BASOPHILS  0.20  0.20  0.10   ASTSGOT  30   --    --    ALTSGPT  29   --    --    ALKPHOSPHAT  61   --    --    TBILIRUBIN  0.4   --    --            Assessment/Plan     * Brain metastasis (CMS-HCC)   Assessment & Plan    4 metastasis in brain with right frontal lobe mass being the largest. Possibly lung vs breast cancer. Biopsy of brain met significant for \"metastatic, poorly differentiated adenocarcinoma favoring breast cancer\" Biopsy did reveal some features that favor lung as primary. After discussing with Dr. Noe (oncology), who spoke with Dr. Davis (rad onc) and pathologist, will proceed " with additional histopathological studies on existing tissue. If further testing is inconclusive, may consider CT guided biopsy of hilar mass however likely to have low diagnostic yield.     S/p craniotomy April 16, 2018 PM    Plan  - Continue decadron to reduce mass effect  - Aspiration, seizure, fall precautions  - Neuro checks Q4  - Awaiting results of additional pathology studies        Seizure (CMS-HCC)- (present on admission)   Assessment & Plan    Likely secondary to metastatic lesions in brain. No further seizure activity noted    Plan  - Continue Keppra 500 mg BID  - Neuro checks  - Aspiration, seizure, fall precautions        Intracranial edema (CMS-HCC)- (present on admission)   Assessment & Plan    Secondary to brain metastasis.    Plan  - Continue IV decadron 4mg Q8hrs x 2-3 days, then BID per neurosurgery recommendations  - Labetalol PRN for BP   - Goal SBP < 140  - Na goal 140-145 salt tablets TID, if not responding to oral salt supplements then to consider hypertonic saline          CAP (community acquired pneumonia) due to Klebsiella pneumoniae (CMS-HCC)   Assessment & Plan    Bronchial washing significant for E. Coli, that is resistant to Ampicillin and bactrim.     Plan  - Treat with IV ceftriaxone to complete 7 days (last day today)        Hilar mass   Assessment & Plan    Identified on CT of the thorax while evaluating for primary of brain metastasis.  Bronchoscopy was done on 4/11, biopsy negative for malignancy; cytology was negative for malignancy. Biopsy was significant for reactive changes/reactive atypia.  S/p craniotomy 4/16/2018     Plan  - Holding of CT-guided biopsy of hilar mass until additional pathological tests have been performed on the brain met. Per Dr. Wilcox's conversation, the tissue from brain met is highly suggestive of breast cancer however additional information will help clarify source        Adrenal nodule (CMS-HCC)   Assessment & Plan    Stable since previous imaging.          Angiolipoma of right kidney   Assessment & Plan    Stable. No need for intervention right now.         Diverticulosis   Assessment & Plan    Asymptomatic. Stable         Essential hypertension- (present on admission)   Assessment & Plan    Currently controlled. On lisinopril 20 mg as outpatient however due to increasing K+, was discontinued while inpatient. SBP goal < 140    Plan  - Continue amlodipine

## 2018-04-19 NOTE — PROGRESS NOTES
2 RN skin check completed. Pt has a surgical incision on right side of head closed with staples, well approximated. A couple bruises on lower abdomen. Dry and calloused feet with a rash on top of feet. Blanchable erythema on coccyx. All other skin intact.

## 2018-04-20 NOTE — PROGRESS NOTES
Neuroscience Shift Summary: [id number]  Orientation-  Alert and oriented x4  Incision- right side cranial incision- JP/CDI/edges well approximated  Lines/Drains- PIV  Telemetry- NA  Pain- Norco and Fioricet for headaches  Diet-  Regular  Discharge Plan- Home    Concerns-  No acute concerns.

## 2018-04-20 NOTE — CONSULTS
DATE OF SERVICE:  04/19/2018    REQUESTING PHYSICIAN:  Sonny Moreno MD    REASON FOR CONSULTATION:  Metastatic brain metastases.    HISTORY:  The patient is a 67-year-old female who had a breast cancer 20 years   ago in 1998.  At that time, she had a lumpectomy by Dr. Ervin Christy.    According to the patient, she also had lymph nodes removed from her left   axilla.  She was on Premarin at that time and that was stopped.  She received   6 weeks of radiation therapy at [facility], but no evidence of any hormonal   therapy or chemotherapy.  She was having mammograms on a regular basis with   the last one about 2 years ago.  She began having a headache a couple of   months ago which became progressively worse and she then had a seizure which   landed her in the emergency room.  A CT brain scan showed a 2.2 cm right   superior frontal lobe metastasis with significant amount of vasogenic edema.    There are 4 other little lesions seen which were enhancing in the brain   parenchyma that did not appear to be very significant.  She had a CT scan of   her chest, which showed a heterogeneous right hilar mass measuring 4.1x2.7 cm.    There was some precarinal lymph node measuring 7 mm.  There was also a 4.5   mm nodule along the major fissure on the left and a 3.5 cm left upper lobe   nodule as well as a few other small nodules.  There were some tiny hepatic   lesions which are too small to characterize.  She had bilateral adrenal   lesions, which do not appear significantly changed from the prior CT scan that   she had had.  There was a 1 cm retroperitoneal lymph node which was   unchanged.  She went to craniotomy for removal of the large primary brain mass   which revealed metastatic poorly differentiated adenocarcinoma.  It was ER   and WI negative, HER2 is pending.  It was very positive for GATA3.  It was   negative for TTF-1 and positive for Napsin A.  The pathologist interpreted the   immunohistochemical stains as indicating  that this was metastasis from her   original breast cancer because of the very positive GATA3.  HER2 is also   pending.  Because she had the pulmonary lesion, she also underwent a   bronchoscopy which was negative for washings and also no visible tumor was   seen in the bronchi.  She is now recovering very nicely.  She is not having   any pulmonary symptoms of cough or sputum production, or chest pain.  She has   been a chronic cigarette smoker for over 20 years and supposedly still smokes   half a pack of cigarettes currently.  There is a family history of breast   cancer in her mother.  We have been unable to find pathology from 20 years ago   and she apparently had the radiation here at [facility] as well and there are no   any records that are available also.    PAST MEDICAL HISTORY:  Breast cancer in 1995, diverticulitis, hypertension,   hiatal hernia symptoms, indigestion, renal stones, GERD.    PAST SURGICAL HISTORY:  She has had multiple gastroscopies and also   colonoscopies for polyps with Dr. Qunitin Lewis.  She has had an abdominal   exploration and hysterectomy in the past, appendectomy, tonsillectomy,   adenoidectomy.  Her breast surgery may not have been done at [facility].    FAMILY HISTORY:  Positive for her mother having breast cancer.    ALLERGIES:  None known.    CURRENT MEDICATIONS:  Norvasc, Decadron, Lexapro, Keppra, Ativan, Nicoderm,   Prilosec.    REVIEW OF SYSTEMS:  Negative by the AMA and CMS criteria other than the   present illness.    PHYSICAL EXAMINATION:  VITAL SIGNS:  Within normal limits.  GENERAL:  Well-developed, well-nourished, awake and alert 67-year-old female.  HEENT:  Sclerae and conjunctivae are nonicteric.  She has a right craniotomy   scar, which is healing very nicely.  Throat and mouth benign.  NECK:  Supple.  LYMPH NODES:  None were palpable in the cervical, supraclavicular, or axillary   area.  LUNGS:  Clear.  HEART:  Regular rate and rhythm.  The lumpectomy site on the left  breast is   very hard to feel.  There is no palpable mass.  The right breast is negative   for any palpable masses or skin dimpling or rashes.  ABDOMEN:  No organomegaly or masses.  EXTREMITIES:  No edema, clubbing, or cyanosis.    IMPRESSION:  1.  Metastatic adenocarcinoma to the brain which is very positive for GATA3   and suspicious for recurrence of her breast cancer from over 20 years ago.  2.  Pulmonary lesions with a significant right hilar mass measuring 4.1x2.7 cm   with a negative bronchoscopy, but suspicious either for primary lung cancer   or metastatic lesion.  3.  Seizure secondary to metastatic brain lesion.  4.  Chronic every day smoking.    DISCUSSION:  It is certainly possible that her metastatic disease is related   to her original breast cancer of over 20 years ago.  We will continue to try   to find the pathology to determine whether she had a small lesion and whether   it was hormone receptor positive or not.  It probably was negative and that   she did not get placed on hormonal therapy by Dr. Christy or referred for   oncologic consultation.  It is also possible it could have been a DCIS, but   the patient claims she also had an axillary dissection, but maybe it was just   axillary sampling.  There is also the possibility that this is actually a   primary lung cancer with brain metastases, but it is rare that lung cancers   are GATA3 positive.  At this time, her major problem is her brain metastases,   for which she has had surgery and will be set up for IMRT by Dr. Wall on   April 30th.  She will only have 3 treatments.  We will have pathology from the   [facility], which I suspect will probably be negative and not really helpful.  I   have asked pathology to send out her tissue for [facility] evaluation.    This will be done 1-2 weeks after she is discharged and will take probably   another 2 weeks to return.  Fortunately, she is not having any symptoms from   her pulmonary lesions.  I will  see her after she has radiation and hopefully   we will have some further results at that time to determine another systemic   therapy might be indicated.  [facility] will not only do the EGFR   mutations also a PD-L1 for immunotherapy.       ____________________________________     MD ISAEL TARIQ / REY    DD:  04/19/2018 14:32:43  DT:  04/19/2018 17:27:46    D#:  [id number]  Job#:  [id number]

## 2018-04-20 NOTE — FACE TO FACE
Face to Face Supporting Documentation - Home Health    The encounter with this patient was in whole or in part the primary reason for home health admission.    Date of encounter:   Patient:                    MRN:                       YOB: 2016  Saadia Crook  [id number]  1950     Home health to see patient for:  Physical Therapy evaluation and treatment and Occupational therapy evaluation and treatment. PT/OT have evaluated patient and have recommended that patient would benefit from home health PT/OT services.     Skilled need for:  New Onset Medical Diagnosis Metastatic cancer to brain. Primary is likely breast cancer    Skilled nursing interventions to include:  Wound Care and Comment: surgical incision wound care    Homebound status evidenced by:  Needs the assistance of another person in order to leave the home. Leaving home requires a considerable and taxing effort. There is a normal inability to leave the home. Patient has a  that is housebound and will not be able to travel to appointments regularly due to her being a primary caregiver to her .     Community Physician to provide follow up care: Emilia Gavin M.D.     Optional Interventions? No      I certify the face to face encounter for this home health care referral meets the CMS requirements and the encounter/clinical assessment with the patient was, in whole, or in part, for the medical condition(s) listed above, which is the primary reason for home health care. Based on my clinical findings: the service(s) are medically necessary, support the need for home health care, and the homebound criteria are met.  I certify that this patient has had a face to face encounter by myself.  Tawana Long M.D. - NPI: [id number]

## 2018-04-20 NOTE — PROGRESS NOTES
Patient being discharged home with home health care.  PIV removed.  Appointments scheduled.  Medications faxed to [facility] pharmacy and daughter picked them up.  Narcotic script handed to patient and education provided on possible addiction to this medication.  No acute concerns.  All questions answered.

## 2018-04-20 NOTE — DISCHARGE PLANNING
GLENROY met with pt at bedside to go over [facility] and HH choice. Pt signed choice for [facility]. Pt requesting transport information. GLENROY provided RTC Access application. GLENROY also provided pt with AD and discussed free notary service.     Choice faxed to [facility].

## 2018-04-20 NOTE — PROGRESS NOTES
Neurosurgery Progress Note    Subjective:  C/o blurred vision this AM with looking at far distances, intermittent   Voiding  Seen by rad/med onc yesterday   Na 136  Minimal HA, incisional pain well controlled on scheduled oral medications  Denies new pain, numbness, weakness.   Denies positional HA, N/V, auditory changes, dizziness, chest pain, SOB, difficulty breathing, chills      Exam:  Alert, oriented x 4, NAD  Normal respiratory effort, RA  Follows commands x4  PERRLA. EOMI.   Face is symmetrical, tongue slight deviation to left   CN II-XII grossly intact bilat,   No difficulty with speech  Trace left pronator drift   No difficulty with rapid alternating movements, no DDK, past-pointing   Appropriate muscle tone and sensation intact all four extremities.   5/5 strength BLE/BUE  Incision healing well without signs of infection, no drainage    BP  Min: 110/63  Max: 138/60  Pulse  Av.4  Min: 68  Max: 77  Resp  Av.8  Min: 16  Max: 19  Temp  Av.8 °C (98.3 °F)  Min: 36.6 °C (97.8 °F)  Max: 37.1 °C (98.8 °F)  SpO2  Av.6 %  Min: 92 %  Max: 98 %    No Data Recorded    Recent Labs      18   0500  18   0445  18   0104   WBC  16.7*  14.0*  17.9*   RBC  3.92*  3.88*  3.69*   HEMOGLOBIN  13.3  13.4  12.6   HEMATOCRIT  39.0  38.4  36.4*   MCV  99.5*  99.0*  98.6*   MCH  33.9*  34.5*  34.1*   MCHC  34.1  34.9  34.6   RDW  45.5  45.8  46.0   PLATELETCT  326  340  307   MPV  9.7  9.6  9.6     Recent Labs      18   0445  18   0851  18   0105   SODIUM  133*  134*  136   POTASSIUM  4.3  3.9  4.1   CHLORIDE  100  101  103   CO2  27  25  24   GLUCOSE  119*  105*  115*   BUN  14  12  15   CREATININE  0.60  0.57  0.58   CALCIUM  8.9  9.4  8.5               Intake/Output       18 0700 - 18 0659 18 - 18 0659       Total  Total       Intake    Total Intake -- -- -- -- -- --       Output    Urine  750  -- 750  --  --  --    Void (ml) 750 -- 750 -- -- --    Stool  --  -- --  --  -- --    Number of Times Stooled 1 x -- 1 x -- -- --    Total Output 750 -- 750 -- -- --       Net I/O     -750 -- -750 -- -- --            Intake/Output Summary (Last 24 hours) at 04/20/18 0902  Last data filed at 04/19/18 1041   Gross per 24 hour   Intake                0 ml   Output              350 ml   Net             -350 ml            • magnesium sulfate  2 g Once   • dexamethasone  4 mg Q8HRS   • LORazepam  0.5 mg Once PRN   • heparin  5,000 Units Q8HRS   • sodium chloride  2 g TID WITH MEALS   • levETIRAcetam  500 mg BID   • oxyCODONE immediate-release  5 mg Q4HRS PRN    Or   • oxyCODONE immediate-release  10 mg Q4HRS PRN   • hydrALAZINE  10-20 mg Q6HRS PRN   • Pharmacy Consult Request  1 Each PRN   • amLODIPine  5 mg Q DAY   • acetaminophen/caffeine/butalbital 325-40-50 mg  1 Tab Q6HRS PRN   • zolpidem  5 mg HS PRN   • senna-docusate  2 Tab BID    And   • polyethylene glycol/lytes  1 Packet QDAY PRN    And   • magnesium hydroxide  30 mL QDAY PRN    And   • bisacodyl  10 mg QDAY PRN   • nicotine  14 mg Daily-0600    And   • nicotine polacrilex  2 mg Q HOUR PRN   • acetaminophen  650 mg Q6HRS PRN   • ondansetron  4 mg Q4HRS PRN   • labetalol  10 mg Q4HRS PRN   • escitalopram  20 mg DAILY   • omeprazole  20 mg DAILY   • morphine injection  2 mg Q3HRS PRN       Assessment:   Hospital day #10: 67-year-old female with a history of breast cancer. Final path favors breast primary adenocarcinoma  POD#4 Stealth guided resection of a right frontal brain mass with microdissection  Prophylactic anticoagulation: yes, ok for Heparin SQ.    Plan:  Recommend SBP < 140  Continue Q4H neuro/vitals   Ok for radiation tx 3 weeks post op, per notes will start IMRT on April 30  If indicated, OK for further workup of hilar mass per medicine/oncology recommendations   Ambulate, mobilize  Continue Keppra 500 mg Q12H indefinitely unless contraindicated for another reason.    Continue slow steroid taper. Decadron 4 mg TID today and tomorrow, wean to BID x 2 days, QD x 2 days, then MDP  Maintain Na 140-145, slowly improving   MRI SRS protocol scheduled for today. Will review once completed   Continue to monitor vision complaints.     Case discussed with patient, RN, Dr. Masterson.  Appreciate all teams involved in the care of this patient

## 2018-04-20 NOTE — DISCHARGE INSTRUCTIONS
Discharge Instructions    Discharged to home by car with relative. Discharged via wheelchair, hospital escort: Refused.  Special equipment needed: Walker    Be sure to schedule a follow-up appointment with your primary care doctor or any specialists as instructed.     Discharge Plan:   Diet Plan: Discussed  Activity Level: Discussed  Smoking Cessation Offered: Patient Counseled  Confirmed Follow up Appointment: Patient to Call and Schedule Appointment  Confirmed Symptoms Management: Discussed  Medication Reconciliation Updated: Yes  Pneumococcal Vaccine Given - only chart on this line when given: Given (See MAR)  Influenza Vaccine Indication: Not indicated: Previously immunized this influenza season and > 8 years of age    I understand that a diet low in cholesterol, fat, and sodium is recommended for good health. Unless I have been given specific instructions below for another diet, I accept this instruction as my diet prescription.   Other diet: Regular    Special Instructions: None    · Is patient discharged on Warfarin / Coumadin?   No   Incision Care, Adult  An incision is a cut that a doctor makes in your skin for surgery (for a procedure). Most times, these cuts are closed after surgery. Your cut from surgery may be closed with stitches (sutures), staples, skin glue, or skin tape (adhesive strips). You may need to return to your doctor to have stitches or staples taken out. This may happen many days or many weeks after your surgery. The cut needs to be well cared for so it does not get infected.  How to care for your cut  Cut care  · Follow instructions from your doctor about how to take care of your cut. Make sure you:  ¨ Wash your hands with soap and water before you change your bandage (dressing). If you cannot use soap and water, use hand .  ¨ Change your bandage as told by your doctor.  ¨ Leave stitches, skin glue, or skin tape in place. They may need to stay in place for 2 weeks or longer. If  tape strips get loose and curl up, you may trim the loose edges. Do not remove tape strips completely unless your doctor says it is okay.  · Check your cut area every day for signs of infection. Check for:  ¨ More redness, swelling, or pain.  ¨ More fluid or blood.  ¨ Warmth.  ¨ Pus or a bad smell.  · Ask your doctor how to clean the cut. This may include:  ¨ Using mild soap and water.  ¨ Using a clean towel to pat the cut dry after you clean it.  ¨ Putting a cream or ointment on the cut. Do this only as told by your doctor.  ¨ Covering the cut with a clean bandage.  · Ask your doctor when you can leave the cut uncovered.  · Do not take baths, swim, or use a hot tub until your doctor says it is okay. Ask your doctor if you can take showers. You may only be allowed to take sponge baths for bathing.  Medicines  · If you were prescribed an antibiotic medicine, cream, or ointment, take the antibiotic or put it on the cut as told by your doctor. Do not stop taking or putting on the antibiotic even if your condition gets better.  · Take over-the-counter and prescription medicines only as told by your doctor.  General instructions  · Limit movement around your cut. This helps healing.  ¨ Avoid straining, lifting, or exercise for the first month, or for as long as told by your doctor.  ¨ Follow instructions from your doctor about going back to your normal activities.  ¨ Ask your doctor what activities are safe.  · Protect your cut from the sun when you are outside for the first 6 months, or for as long as told by your doctor. Put on sunscreen around the scar or cover up the scar.  · Keep all follow-up visits as told by your doctor. This is important.  Contact a doctor if:  · Your have more redness, swelling, or pain around the cut.  · You have more fluid or blood coming from the cut.  · Your cut feels warm to the touch.  · You have pus or a bad smell coming from the cut.  · You have a fever or shaking chills.  · You feel  sick to your stomach (nauseous) or you throw up (vomit).  · You are dizzy.  · Your stitches or staples come undone.  Get help right away if:  · You have a red streak coming from your cut.  · Your cut bleeds through the bandage and the bleeding does not stop with gentle pressure.  · The edges of your cut open up and separate.  · You have very bad (severe) pain.  · You have a rash.  · You are confused.  · You pass out (faint).  · You have trouble breathing and you have a fast heartbeat.  This information is not intended to replace advice given to you by your health care provider. Make sure you discuss any questions you have with your health care provider.  Document Released: 03/11/2013 Document Revised: 08/25/2017 Document Reviewed: 08/25/2017  [employer] Interactive Patient Education © 2017 [employer]      Depression / Suicide Risk    As you are discharged from this [facility] facility, it is important to learn how to keep safe from harming yourself.    Recognize the warning signs:  · Abrupt changes in personality, positive or negative- including increase in energy   · Giving away possessions  · Change in eating patterns- significant weight changes-  positive or negative  · Change in sleeping patterns- unable to sleep or sleeping all the time   · Unwillingness or inability to communicate  · Depression  · Unusual sadness, discouragement and loneliness  · Talk of wanting to die  · Neglect of personal appearance   · Rebelliousness- reckless behavior  · Withdrawal from people/activities they love  · Confusion- inability to concentrate     If you or a loved one observes any of these behaviors or has concerns about self-harm, here's what you can do:  · Talk about it- your feelings and reasons for harming yourself  · Remove any means that you might use to hurt yourself (examples: pills, rope, extension cords, firearm)  · Get professional help from the community (Mental Health, Substance Abuse, psychological  counseling)  · Do not be alone:Call your Safe Contact- someone whom you trust who will be there for you.  · Call your local CRISIS HOTLINE 183-4668 or 169-047-2690  · Call your local Children's Mobile Crisis Response Team [place] (979) 500-8399 or www.VisualOn  · Call the toll free National Suicide Prevention Hotlines   · National Suicide Prevention Lifeline 263-929-DCVU (5018)  · National Hope Line Network 800-SUICIDE (004-8904)

## 2018-04-20 NOTE — DISCHARGE SUMMARY
.          Internal Medicine Discharge Summary  Note Author: Tawana Long M.D.       Admit Date: 4/10/2018       Discharge Date: 4/20/2018    Service: [facility] Medicine [facility], located within [facility] Team  Attending Physician(s): Dr. Torres        Senior Resident(s): Dr. Moreno, Dr. Bates  [place] Resident(s): Dr. Long       Primary Diagnosis:   Brain metastases secondary to likely breast cancer    Secondary Diagnoses:                Principal Problem:    Brain metastasis (CMS-HCC) POA: Unknown  Active Problems:    Intracranial edema (CMS-HCC) POA: Yes    Seizure (CMS-HCC) POA: Yes    Hilar mass POA: Unknown    CAP (community acquired pneumonia) due to Klebsiella pneumoniae (CMS-HCC) POA: Unknown    Essential hypertension (Chronic) POA: Yes    Diverticulosis (Chronic) POA: Unknown    Angiolipoma of right kidney POA: Unknown    Adrenal nodule (CMS-HCC) POA: Unknown  Resolved Problems:    * No resolved hospital problems. *      Hospital Summary (Brief Narrative):       67 years year old female with PMHx of breast cancer in 1998 (treated with lumpectomy, radiation therapy, axillary lymph node dissection) presented from [facility] for further evaluation of brain mass noted on CT imaging.       Metastatic cancer to brain; likely primary of breast cancer  Intracranial edema secondary to brain metastasis  - Patient's CT brain was significant for lesions suspicious of metastatic cancer. CT also had findings suggestive of possible hemorrhage, cerebral edema. Neurosugery (Dr. Masterson) was consulted and recommended patient to be started on IV decadron with frequent neuro checks. Patient was also loaded with keppra for seizures (had 2 episodes prior to presentation). She subsequently under went a CT chest, abdomen and pelvis. Of significance, a right hilar mass was noted. Pulmonology was consulted for bronchoscopy with biopsy. Biopsy and cytology from bronch was negative for malignancy. After discussing with pulmonology and neurosurgery,  patient underwent right frontal craniotomy with resection of right frontal brain metastasis. Post-op course was uneventful. Dr. Davis (rad onc) was consulted, he will be following patient up as outpatient for Radiation Mapping on April 30th. Patient will be undergoing radiation therapy for the rest of her brain mets. Patient is to follow-up with Dr. Masterson as outpatient in 2 weeks. Neurosugery recommended decadron taper to medrol dose pack along with sodium tabs for 1 week. Patient is to follow-up with her PCP in 1 week to follow-up sodium levels. Patient was discharged with Keppra as well.     - Pathology of biopsy was significant for likely breast cancer however features of lung cancer were also present. Dr. Noe of oncology was consulted, who discussed pathology report with pathologist. Dr. Noe recommended best course of management would be to perform further testing on existing tissue (i.e. Foundation one testing) rather than performing a CT-guided biopsy on hilar mass. Patient is to follow-up with Dr. Noe as outpatient for further management of her cancer. Dr. Noe will follow-up on the foundation one testing results, which could take up to 1-2 weeks to obtain results.      - Prior to discharge, patient was set-up with home health with PT/OT and nursing. Patient was signed for [facility]. They will be meeting with patient Monday. Patient is stable for discharge and will follow-up with home health on Monday for Home health services.       Seizures  - Patient's seizure-like activity likely due to metastatic lesions in her brain. Patient was loaded with keppra, then continued on 500 mg BID. Patient did not have further seizure/seizure-like activity while she remained hospitalized. She was discharged with Keppra with instructions not to drive. She is also to use a walker to prevent falls. FWW provided at discharge      Community Acquired Pneumonia  - On admission, patient had mildly elevated  white count, cough with expectoration. Bronchial washing was significant for E. Coli. Patient was started on IV ceftriaxone based on sensitivities for 7 day course. Patients cough improved, remained afebrile.       Hypertension  - Patient was on lisinopril for HTN however due to high normal K levels, lisinopril was switched to amlodipine. BP remained stable. To continue amlodipine for now. PCP to follow-up on Chem panel and titrate anti-hypertensives as needed.       Consultants:     Dr. Masterson (Neurosurgery)  Dr. Crandall (Pulmonology)  Dr. Wall (Rad Onc)  Dr. Noe (Oncology)      Procedures:        Bronchoscopy 4/11/2018  Craniotomy with right frontal brain mass resection 4/16/2018    Imaging/ Testing:      MR-BRAIN-WITH & W/O   Final Result         1.  Interval right frontal craniotomy for resection of underlying brain metastasis with postoperative defect noted in the right frontal lobe. Further there is a moderate amount of surrounding increased T2 signal intensity in the adjacent frontal white    matter consistent with postoperative change/vasogenic edema.      2.  Thick curvilinear band of enhancement adjacent to the posterior medial margin of the resection site suspicious for residual neoplasm. Further there is thinning curvilinear enhancement adjacent to the anterior margin of the resection site, more    consistent with postoperative change.         3.  3. Additional small brain metastases are noted near the gray-white junction in the left anterior frontal, parietal, and occipital lobes unchanged from previous exam      MR-BRAIN-WITH & W/O   Final Result         1.  4 brain metastases near the gray-white junction in the frontal, left parietal, and left occipital lobes.      CT-HEAD W/O   Final Result      Status post resection of the right frontal mass with postoperative changes as described. No significant midline shift is identified.         DX-CHEST-PORTABLE (1 VIEW)   Final Result      Prominence of  the right hilum corresponding to the right hilar mass seen on same day CT.      CT-CHEST,ABDOMEN,PELVIS WITH   Final Result      Right hilar mass measures 4.1 x 2.7 cm. This appears compatible with malignancy.      9 mm nodule is seen along the minor fissure on the right.      Bilateral subcentimeter pulmonary nodules.      Tiny hepatic lesion is too small to characterize.      Colonic diverticulosis.      Bilateral adrenal lesions are not significantly changed compared to prior.      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      Tiny nonobstructing left renal calculi may be present.      1 cm retroperitoneal lymph node is unchanged compared to prior.               MR-BRAIN-WITH & W/O   Final Result         1.  4 brain metastases near the gray-white junction in the frontal, left parietal, and left occipital lobes.      2.  The largest metastasis is in the right superior frontal lobe near the gray-white junction and measures 2.2 cm in greatest diameter. Further there is a moderate amount of surrounding vasogenic edema which causes mild effacement of the right lateral    ventricle.           Discharge Medications:         Medication Reconciliation: Completed       Medication List      START taking these medications      Instructions   acetaminophen/caffeine/butalbital 325-40-50 mg -40 MG Tabs  Commonly known as:  FIORICET  Notes to patient:  Take for headaches   Take 1 Tab by mouth every 6 hours as needed for Headache.  Dose:  1 Tab     amLODIPine 5 MG Tabs  Start taking on:  4/21/2018  Commonly known as:  NORVASC  Notes to patient:  This medication is to help control your BP   Take 1 Tab by mouth every day.  Dose:  5 mg     dexamethasone 4 MG Tabs  Commonly known as:  DECADRON  Notes to patient:  Please wean off of this medication, follow instructions carefully   4 mg every 8 hours for 2 days, then 4 mg every 12 hours for 3 days, then 4 mg daily for 3 days. Followed by medrol  dose pack     levETIRAcetam 500 MG Tabs  Commonly known as:  KEPPRA  Notes to patient:  This is your seizure medication, DO NOT MISS THIS    Take 1 Tab by mouth 2 Times a Day for 30 days.  Dose:  500 mg     MethylPREDNISolone 4 MG Tbpk  Commonly known as:  MEDROL DOSEPAK  Notes to patient:  Start after your decadron taper   To follow after decadron taper     oxyCODONE immediate release 10 MG immediate release tablet  Commonly known as:  ROXICODONE  Notes to patient:  ONLY TAKE IF other medications are not effective for pain control   Take 1 Tab by mouth every 6 hours as needed for up to 7 days.  Dose:  10 mg     sodium chloride 1 GM Tabs  Commonly known as:  SALT  Notes to patient:  Take with food   Take 1 Tab by mouth 2 Times a Day for 7 days.  Dose:  1 g        CONTINUE taking these medications      Instructions   ascorbic acid 500 MG Tabs  Commonly known as:  ascorbic acid   Take 500 mg by mouth every day.  Dose:  500 mg     B-12 PO   Take 2 Tabs by mouth every day.  Dose:  2 Tab     escitalopram 20 MG tablet  Commonly known as:  LEXAPRO   Take 20 mg by mouth every day.  Dose:  20 mg     pantoprazole 40 MG Tbec  Commonly known as:  PROTONIX   Take 40 mg by mouth every day.  Dose:  40 mg        STOP taking these medications    asa/apap/caffeine 250-250-65 MG Tabs  Commonly known as:  EXCEDRIN     lisinopril 20 MG Tabs  Commonly known as:  PRINIVIL              Disposition:   Discharge home    Diet:   As tolerated.     Activity:   As tolerated, use FWW to prevent falls    Instructions:      Please take medications as prescribed   Follow-up with PCP in 1 week to monitor your sodium levels   Follow-up with Neurosurgery (Dr. Mastesron) in 2 weeks   Follow-up with Dr. Wall  April 30th for radiation-oncology visit.   Follow-up with Dr. Noe in about 3-4 weeks for further cancer treatment      The patient was instructed to return to the ER in the event of worsening symptoms. I have counseled the patient on the  importance of compliance and the patient has agreed to proceed with all medical recommendations and follow up plan indicated above.   The patient understands that all medications come with benefits and risks. Risks may include permanent injury or death and these risks can be minimized with close reassessment and monitoring.        Primary Care Provider:      Discharge summary faxed to primary care provider:  Deferred  Copy of discharge summary given to the patient: Deferred      Follow up appointment details :      Please take medications as prescribed   Follow-up with PCP in 1 week to monitor your sodium levels   Follow-up with Neurosurgery (Dr. Masterson) in 2 weeks   Follow-up with Dr. Wall  April 30th for radiation-oncology visit.   Follow-up with Dr. Noe in about 3-4 weeks for further cancer treatment    Pending Studies:        Foundation one studies    Time spent on discharge day patient visit, preparing discharge paperwork and arranging for patient follow up.    Summary of follow up issues:   Please take medications as prescribed   Follow-up with PCP in 1 week to monitor your sodium levels   Follow-up with Neurosurgery (Dr. Masterson) in 2 weeks   Follow-up with Dr. Wall  April 30th for radiation-oncology visit.   Follow-up with Dr. Noe in about 3-4 weeks for further cancer treatment. Follow-up on foundation one studies    Discharge Time (Minutes) :    65 minutes  Hospital Course Type:  Inpatient Stay >2 midnights      Condition on Discharge  Hemodynamically stable, guarded prognosis  ______________________________________________________________________    Interval history/exam for day of discharge:    No overnight events. Patient's headaches are well controlled. Hemodynamically stable. Cleared by neurosurgery for discharge. Will follow-up with them as outpatient.     Vitals:    04/19/18 1600 04/19/18 2000 04/20/18 0400 04/20/18 0800   BP: 126/60 110/63 122/77 138/60   Pulse: 69 72 77 68   Resp: 17 16  16 16   Temp: 36.7 °C (98.1 °F) 36.6 °C (97.8 °F) 36.8 °C (98.2 °F) 37.1 °C (98.8 °F)   SpO2: 92% 93% 93% 92%   Weight:       Height:         Weight/BMI: Body mass index is 21.86 kg/m².  Pulse Oximetry: 92 %, O2 (LPM): 0, O2 Delivery: None (Room Air)    Constitutional: Not in acute distress. Comfortable.   HEENT: Normocephalic, atraumatic. EOMI. Oropharynx clear  Cardiovascular: Normal heart rate, Normal rhythm   Lungs: Respiratory effort is normal. Normal breath sounds  Abdomen: Bowel sounds normal, Soft, No tenderness  Skin: No erythema, No rash  Surgical incision on right scalp healing well. No signs of infection.   Lower limbs: normal, no pitting edema   Neurologic: Alert & oriented x 3, Normal motor function, No focal deficits noted, cranial nerves II through XII are normal  PSY: stable mood.   Other systems also examined, grossly normal.       Most Recent Labs:    Lab Results   Component Value Date/Time    WBC 17.9 (H) 04/20/2018 01:04 AM    RBC 3.69 (L) 04/20/2018 01:04 AM    HEMOGLOBIN 12.6 04/20/2018 01:04 AM    HEMATOCRIT 36.4 (L) 04/20/2018 01:04 AM    MCV 98.6 (H) 04/20/2018 01:04 AM    MCH 34.1 (H) 04/20/2018 01:04 AM    MCHC 34.6 04/20/2018 01:04 AM    MPV 9.6 04/20/2018 01:04 AM    NEUTSPOLYS 83.10 (H) 04/20/2018 01:04 AM    LYMPHOCYTES 6.90 (L) 04/20/2018 01:04 AM    MONOCYTES 7.20 04/20/2018 01:04 AM    EOSINOPHILS 0.80 04/20/2018 01:04 AM    BASOPHILS 0.30 04/20/2018 01:04 AM      Lab Results   Component Value Date/Time    SODIUM 136 04/20/2018 01:05 AM    POTASSIUM 4.1 04/20/2018 01:05 AM    CHLORIDE 103 04/20/2018 01:05 AM    CO2 24 04/20/2018 01:05 AM    GLUCOSE 115 (H) 04/20/2018 01:05 AM    BUN 15 04/20/2018 01:05 AM    CREATININE 0.58 04/20/2018 01:05 AM    CREATININE 0.7 09/09/2008 03:15 AM      Lab Results   Component Value Date/Time    ALTSGPT 29 04/17/2018 05:00 AM    ASTSGOT 30 04/17/2018 05:00 AM    ALKPHOSPHAT 61 04/17/2018 05:00 AM    TBILIRUBIN 0.4 04/17/2018 05:00 AM    LIPASE 112  (H) 07/11/2008 07:25 AM    ALBUMIN 4.0 04/17/2018 05:00 AM    GLOBULIN 2.8 04/17/2018 05:00 AM    INR 0.91 04/11/2018 04:45 AM    MACROCYTOSIS 1+ 07/11/2008 07:25 AM     Lab Results   Component Value Date/Time    PROTHROMBTM 12.0 04/11/2018 04:45 AM    INR 0.91 04/11/2018 04:45 AM

## 2018-04-20 NOTE — CARE PLAN
Problem: Pain Management  Goal: Pain level will decrease to patient's comfort goal  PRN pain medications administered in attempt to alleviate pain.

## 2018-04-20 NOTE — THERAPY
"Physical Therapy Evaluation completed.   Bed Mobility:  Supine to Sit: Supervised  Transfers: Sit to Stand: Stand by Assist  Gait: Level Of Assist: Stand by Assist with Front-Wheel Walker       Plan of Care: Will benefit from Physical Therapy 3 times per week  Discharge Recommendations: Equipment: Will Continue to Assess for Equipment Needs.     Pt presents with impaired activity tolerance, vision changes, headache and balance s/p right frontal mass resection. Pt is most limited by headache at this session, increasing to 7/10 after 150ft ambulation with vision changes of 'sparkles' that took a minute to subside, BP at that time 126/70. In current condition, would be functional able to return home when medically appropriate to do so in plan of care. Would benefit from home health PT as pt was caregiving for her  prior and may need activity modification and further equipment. Will follow.     See \"Rehab Therapy-Acute\" Patient Summary Report for complete documentation.     "

## 2018-04-24 NOTE — PROGRESS NOTES
Nurse Navigation    0841 Intro call to pt.  Pt states she is feeling well.  She has upcoming appts with her surgeon Dr. Masterson and her Radiation Oncologist Dr. Wall on 4/30.  She states she is awaiting an appt with Medical Oncologist Dr. Noe.  At this time her primary concern is obtaining an order to check her sodium & potassium levels as she was told prior to discharge it is very important that these be monitored closely.  She has left a  and sent a message via [employer] to her PCP Dr. Emilia Gavin with a request.  She states that she has been diligent in keeping a log of her medications and her BP.  Sent pt a letter via [employer] explaining ONN role and support services available.  Once POC is determined ONN will be available for further assessment of potential barriers.    0350 Phoned Dr. Masterson's office and spoke with Gisela who verified pt is scheduled for f/u on 4/30 @ 1600.  Explained pt's request for lab order.  Gisela will talk to Dr. Masterson's MA for clarification.

## 2018-04-24 NOTE — TELEPHONE ENCOUNTER
1. Caller Name: Saadia Crook              Call Back Number: [phone] (home)       Patient approves a detailed voicemail message: yes     Per her recent hospital discharge,pt is in need of magnesium and potassium lab work for her upcoming radiation and chemo appointments

## 2018-04-24 NOTE — TELEPHONE ENCOUNTER
Pt states she was having them done 3-4 times a day while in the hospital and currently taking salt tablets three times a day, she recently underwent brain surgery and is needing updated labs before she can begin treatment.

## 2018-05-10 NOTE — TELEPHONE ENCOUNTER
----- Message from SHRAVAN Rae sent at 5/10/2018 11:56 AM PDT -----  Please notify patient that her electrolytes, kidney function, and magnesium  levels are normal.    SHRAVAN Rae (covering for Dr. Gavin)

## 2018-05-30 NOTE — PROGRESS NOTES
Patient Saadia Crook admitted for brain metastasis on 4/10/18 and was discharged on 4/20/18. [facility] Patient Advocate assisted with discharge orders including, 1 - PCP appointment, 1 - neurosurgery appointment, 1 - oncology appointment, and 1 - laboratory service. Patient also kept 6 radiation therapy appointments. Patient is scheduled for an imaging appointment on 5/25/18, and an oncology appointment on 5/31/18.

## 2018-06-07 NOTE — ED NOTES
"Chief Complaint   Patient presents with   • Shaking     yesterday c/o \"uncontrolled shaking yesterday\" started in chin and moved across face per pt   • Dizziness     started this morning, pt states started to fall into chair   • Neck Pain     started two weeks ago   • Shoulder Pain     Left     BP (!) 188/95   Pulse 96   Temp 37.2 °C (98.9 °F)   Resp 16   Ht 1.727 m (5' 8\")   Wt 68.6 kg (151 lb 3.8 oz)   SpO2 97%   BMI 23.00 kg/m²     " 3

## 2018-06-25 NOTE — NON-PROVIDER
Patient was seen today in clinic with Dr. Wall for Follow up.  Vitals signs and weight were obtained and pain assessment was completed.  Allergies and medications were reviewed with the patient.  Toxicities of treatment assessed.     Vitals/Pain:  Vitals:    06/25/18 0849   BP: 148/75   Pulse: (!) 102   Temp: 36.4 °C (97.5 °F)   SpO2: 95%   Weight: 69.9 kg (154 lb)   Pain Score: 4=Slight-Moderate Pain  Location: Head  Description: Aching        Allergies:   Patient has no known allergies.    Current Medications:  Current Outpatient Prescriptions   Medication Sig Dispense Refill   • levETIRAcetam (KEPPRA) 500 MG Tab Take 500 mg by mouth 2 times a day.     • cyclobenzaprine (FLEXERIL) 10 MG Tab Take 10 mg by mouth 3 times a day as needed.     • acetaminophen/caffeine/butalbital 325-40-50 mg (FIORICET) -40 MG Tab Take 1 Tab by mouth every 6 hours as needed for Headache. 30 Tab 0   • amLODIPine (NORVASC) 5 MG Tab Take 1 Tab by mouth every day. 30 Tab 0   • Cyanocobalamin (B-12 PO) Take 2 Tabs by mouth every day.     • ascorbic acid (ASCORBIC ACID) 500 MG Tab Take 500 mg by mouth every day.     • escitalopram (LEXAPRO) 20 MG tablet Take 20 mg by mouth every day.     • pantoprazole (PROTONIX) 40 MG Tablet Delayed Response Take 40 mg by mouth every day.     • dexamethasone (DECADRON) 4 MG Tab 4 mg every 8 hours for 2 days, then 4 mg every 12 hours for 3 days, then 4 mg daily for 3 days. Followed by medrol dose pack (Patient not taking: Reported on 6/25/2018) 14 Tab 0   • MethylPREDNISolone (MEDROL DOSEPAK) 4 MG Tablet Therapy Pack To follow after decadron taper (Patient not taking: Reported on 6/25/2018) 1 Kit 0     No current facility-administered medications for this encounter.          PCP:  Mak Orozco Ass't

## 2018-06-25 NOTE — PROGRESS NOTES
RADIATION ONCOLOGY FOLLOW UP    DATE OF SERVICE: 6/25/2018    IDENTIFICATION:   Brain metastasis, four lesions identified right frontal, left parietal, left posterior inferior occipital, left anterior inferior occipital; status post craniotomy on April 16, 2018 of the dominant right frontal lesion; path favoring [id number] positive breast primary; remote history of breast cancer treated in 1998 with lumpectomy sentinel lymph node biopsy followed by adjuvant radiotherapy no chemotherapy status post radiosurgery to 2700 cGy in 3 fractions to the right frontal tumor bed in 2000 cGy in a single fraction to the left frontal, left parietal, and left occipital lesion completing in May 2018, now with 3 new brain metastasis to right frontal and one left frontal, planning on port placement on Thursday for systemic chemotherapy    INTERVAL HISTORY: I had the pleasure of seeing Ms. Crook today in follow up for her metastatic breast cancer with brain metastasis.  As a part of her restaging she was appreciated to have 3 new lesions in the brain all less than 5 mm in size and asymptomatic but new.  She was planning on port placement later this week to start her chemotherapy and Herceptin.  Have been asked to see her in reevaluation to treat these 3 brain lesions.    PHYSICAL EXAM:    Vitals:    06/25/18 0849   BP: 148/75   Pulse: (!) 102   Temp: 36.4 °C (97.5 °F)   SpO2: 95%   Weight: 69.9 kg (154 lb)   Pain Score: 4=Slight-Moderate Pain  Location: Head  Description: Aching      1= Restricted in physically strenuous activity, but ambulatory and able to carry out work of a light sedentary nature, e.g., light housework, office work.      GENERAL: No apparent distress.  HEENT:  Pupils are equal, round, and reactive to light.  Extraocular muscles   are intact. Sclerae nonicteric.  Conjunctivae pink.  Oral cavity, tongue   protrudes midline.   NECK:  Supple without evidence of thyromegaly.  NODES:  No peripheral adenopathy of the neck,  supraclavicular fossa or axillae   bilaterally.  LUNGS:  Clear to ascultation bilaterally   HEART:  Regular rate and rhythm.  No murmur appreciated  ABDOMEN:  Soft. No evidence of hepatosplenomegaly.  Positive bowel sounds.  EXTREMITIES:  Without Edema.  NEUROLOGIC:  Cranial nerves II through XII were intact. Normal stance and gait motor and sensory grossly within normal limits      IMPRESSION:   Brain metastasis, four lesions identified right frontal, left parietal, left posterior inferior occipital, left anterior inferior occipital; status post craniotomy on April 16, 2018 of the dominant right frontal lesion; path favoring [id number] positive breast primary; remote history of breast cancer treated in 1998 with lumpectomy sentinel lymph node biopsy followed by adjuvant radiotherapy no chemotherapy status post radiosurgery to 2700 cGy in 3 fractions to the right frontal tumor bed in 2000 cGy in a single fraction to the left frontal, left parietal, and left occipital lesion completing in May 2018, now with 3 new brain metastasis to right frontal and one left frontal, planning on port placement on Thursday for systemic chemotherapy      RECOMMENDATIONS:   I discussed the diagnosis, prognosis, and treatment options over a 45 min time period, 95% of that time dedicated to ongoing treatment management.  I completed an MRI fusion of her previous MRI with the most recent MRI to show her both the treated lesions and new lesions.  The treated lesions have gliosis consistent with post radiation effect.  I do not see any evidence of recurrence in these lesions.  However there are 2 new additional right frontal lesions higher than the previous postoperative bed as well as a left frontal lesion higher than the previous left frontal lesion.  These are all within several cross sections of each other and may be amenable to a single minor mono isocenter radiosurgery technique.  I anticipate a single fraction to these 3 areas of 2000  cGy.  I did show her a small area that could be vessel or the beginning of a new lesion in the left occipital lobe.  We will continue to observe her brain with MRI.  I would like however the ability to start systemic therapy before we utilize whole brain radiotherapy to stop the reemergence of new brain lesions.  Patient agrees and would prefer radiosurgery at this time over whole brain radiotherapy.  She is coming back tomorrow morning at 8 AM for radiosurgery simulation.      If patient has any questions or concerns, she should feel free to contact me.

## 2018-06-28 NOTE — OR SURGEON
Immediate Post- Operative Note        PostOp Diagnosis: Breast cancer.       Procedure(s): Port placement for chemotherapy.       Estimated Blood Loss: Less than 5 ml        Complications: None            6/28/2018     1411 PM     Hua Ortega

## 2018-06-28 NOTE — PROGRESS NOTES
Discharge instructions reviewed with patient and family, all questions answered. IV removed and belongings returned.  Patient ambulated out in a stable condition.       ACTIVITY: Rest and take it easy for the first 24 hours.  A responsible adult is recommended to remain with you during that time.  It is normal to feel sleepy.  We encourage you to not do anything that requires balance, judgment or coordination.    MILD FLU-LIKE SYMPTOMS ARE NORMAL. YOU MAY EXPERIENCE GENERALIZED MUSCLE ACHES, THROAT IRRITATION, HEADACHE AND/OR SOME NAUSEA.    FOR 24 HOURS DO NOT:  Drive, operate machinery or run household appliances.  Drink beer or alcoholic beverages.   Make important decisions or sign legal documents.    SPECIAL INSTRUCTIONS: No shower or bath for 1 week. Sponge Bath only.  Keep surgical site clean dry and covered until fully healed. Do not lift right arm above head for 1 week, Do not lift over 10 lbs for 1 week with right arm.     DIET: To avoid nausea, slowly advance diet as tolerated, avoiding spicy or greasy foods for the first day.  Add more substantial food to your diet according to your physician's instructions.  Babies can be fed formula or breast milk as soon as they are hungry.  INCREASE FLUIDS AND FIBER TO AVOID CONSTIPATION.    SURGICAL DRESSING/BATHING: No shower or bath for 1 week. Sponge Bath only.  Keep surgical site clean dry and covered until fully healed.    FOLLOW-UP APPOINTMENT:  A follow-up appointment should be arranged with your doctor in 1 week; call to schedule.    You should CALL YOUR PHYSICIAN if you develop:  Fever greater than 101 degrees F.  Pain not relieved by medication, or persistent nausea or vomiting.  Excessive bleeding (blood soaking through dressing) or unexpected drainage from the wound.  Extreme redness or swelling around the incision site, drainage of pus or foul smelling drainage.  Inability to urinate or empty your bladder within 8 hours.  Problems with breathing or  chest pain.    You should call 911 if you develop problems with breathing or chest pain.  If you are unable to contact your doctor or surgical center, you should go to the nearest emergency room or urgent care center.     If any questions arise, call your doctor.  If your doctor is not available, please feel free to call the Surgical Center at [phone].  The Center is open Monday through Friday from 7AM to 7PM.  You can also call the HEALTH HOTLINE open 24 hours/day, 7 days/week and speak to a nurse at [phone], or toll free at [phone].    A registered nurse may call you a few days after your surgery to see how you are doing after your procedure.    MEDICATIONS: Resume taking daily medication.  Take prescribed pain medication with food.  If no medication is prescribed, you may take non-aspirin pain medication if needed.  PAIN MEDICATION CAN BE VERY CONSTIPATING.  Take a stool softener or laxative such as senokot, pericolace, or milk of magnesia if needed.      If your physician has prescribed pain medication that includes Acetaminophen (Tylenol), do not take additional Acetaminophen (Tylenol) while taking the prescribed medication.    Depression / Suicide Risk    As you are discharged from this [facility] facility, it is important to learn how to keep safe from harming yourself.    Recognize the warning signs:  · Abrupt changes in personality, positive or negative- including increase in energy   · Giving away possessions  · Change in eating patterns- significant weight changes-  positive or negative  · Change in sleeping patterns- unable to sleep or sleeping all the time   · Unwillingness or inability to communicate  · Depression  · Unusual sadness, discouragement and loneliness  · Talk of wanting to die  · Neglect of personal appearance   · Rebelliousness- reckless behavior  · Withdrawal from people/activities they love  · Confusion- inability to concentrate     If you or a loved one observes  any of these behaviors or has concerns about self-harm, here's what you can do:  · Talk about it- your feelings and reasons for harming yourself  · Remove any means that you might use to hurt yourself (examples: pills, rope, extension cords, firearm)  · Get professional help from the community (Mental Health, Substance Abuse, psychological counseling)  · Do not be alone:Call your Safe Contact- someone whom you trust who will be there for you.  · Call your local CRISIS HOTLINE 449-1002 or 715-778-9568  · Call your local Children's Mobile Crisis Response Team [place] (117) 573-6041 or www.[employer]  · Call the toll free National Suicide Prevention Hotlines   · National Suicide Prevention Lifeline 899-782-DNUO (2789)  · National Hope Line Network 800-SUICIDE (546-4385)      Implanted Port Home Guide  An implanted port is a type of central line that is placed under the skin. Central lines are used to provide IV access when treatment or nutrition needs to be given through a person's veins. Implanted ports are used for long-term IV access. An implanted port may be placed because:   · You need IV medicine that would be irritating to the small veins in your hands or arms.    · You need long-term IV medicines, such as antibiotics.    · You need IV nutrition for a long period.    · You need frequent blood draws for lab tests.    · You need dialysis.    Implanted ports are usually placed in the chest area, but they can also be placed in the upper arm, the abdomen, or the leg. An implanted port has two main parts:   · Driggs. The reservoir is round and will appear as a small, raised area under your skin. The reservoir is the part where a needle is inserted to give medicines or draw blood.    · Catheter. The catheter is a thin, flexible tube that extends from the reservoir. The catheter is placed into a large vein. Medicine that is inserted into the reservoir goes into the catheter and then into the vein.    HOW  "WILL I CARE FOR MY INCISION SITE?  Do not get the incision site wet. Bathe or shower as directed by your health care provider.   HOW IS MY PORT ACCESSED?  Special steps must be taken to access the port:   · Before the port is accessed, a numbing cream can be placed on the skin. This helps numb the skin over the port site.    · Your health care provider uses a sterile technique to access the port.  ¨ Your health care provider must put on a mask and sterile gloves.  ¨ The skin over your port is cleaned carefully with an antiseptic and allowed to dry.  ¨ The port is gently pinched between sterile gloves, and a needle is inserted into the port.  · Only \"non-coring\" port needles should be used to access the port. Once the port is accessed, a blood return should be checked. This helps ensure that the port is in the vein and is not clogged.    · If your port needs to remain accessed for a constant infusion, a clear (transparent) bandage will be placed over the needle site. The bandage and needle will need to be changed every week, or as directed by your health care provider.    · Keep the bandage covering the needle clean and dry. Do not get it wet. Follow your health care provider's instructions on how to take a shower or bath while the port is accessed.    · If your port does not need to stay accessed, no bandage is needed over the port.    WHAT IS FLUSHING?  Flushing helps keep the port from getting clogged. Follow your health care provider's instructions on how and when to flush the port. Ports are usually flushed with saline solution or a medicine called heparin. The need for flushing will depend on how the port is used.   · If the port is used for intermittent medicines or blood draws, the port will need to be flushed:    ¨ After medicines have been given.    ¨ After blood has been drawn.    ¨ As part of routine maintenance.    · If a constant infusion is running, the port may not need to be flushed.    HOW LONG WILL " MY PORT STAY IMPLANTED?  The port can stay in for as long as your health care provider thinks it is needed. When it is time for the port to come out, surgery will be done to remove it. The procedure is similar to the one performed when the port was put in.   WHEN SHOULD I SEEK IMMEDIATE MEDICAL CARE?  When you have an implanted port, you should seek immediate medical care if:   · You notice a bad smell coming from the incision site.    · You have swelling, redness, or drainage at the incision site.    · You have more swelling or pain at the port site or the surrounding area.    · You have a fever that is not controlled with medicine.     This information is not intended to replace advice given to you by your health care provider. Make sure you discuss any questions you have with your health care provider.     Document Released: 12/18/2006 Document Revised: 10/08/2014 Document Reviewed: 08/25/2014  [employer] Interactive Patient Education ©2016 [employer]    Implanted Port Insertion, Care After  Refer to this sheet in the next few weeks. These instructions provide you with information on caring for yourself after your procedure. Your health care provider may also give you more specific instructions. Your treatment has been planned according to current medical practices, but problems sometimes occur. Call your health care provider if you have any problems or questions after your procedure.  WHAT TO EXPECT AFTER THE PROCEDURE  After your procedure, it is typical to have the following:   · Discomfort at the port insertion site. Ice packs to the area will help.  · Bruising on the skin over the port. This will subside in 3-4 days.  HOME CARE INSTRUCTIONS  · After your port is placed, you will get a 's information card. The card has information about your port. Keep this card with you at all times.    · Know what kind of port you have. There are many types of ports available.    · Wear a medical alert bracelet  in case of an emergency. This can help alert health care workers that you have a port.    · The port can stay in for as long as your health care provider believes it is necessary.    · A home health care nurse may give medicines and take care of the port.    · You or a family member can get special training and directions for giving medicine and taking care of the port at home.    SEEK MEDICAL CARE IF:   · Your port does not flush or you are unable to get a blood return.    · You have a fever or chills.  SEEK IMMEDIATE MEDICAL CARE IF:  · You have new fluid or pus coming from your incision.    · You notice a bad smell coming from your incision site.    · You have swelling, pain, or more redness at the incision or port site.    · You have chest pain or shortness of breath.     This information is not intended to replace advice given to you by your health care provider. Make sure you discuss any questions you have with your health care provider.     Document Released: 10/08/2014 Document Revised: 12/23/2014 Document Reviewed: 10/08/2014  [employer] Interactive Patient Education ©2016 [employer]

## 2018-06-28 NOTE — PROGRESS NOTES
P IR RN      Site Marked and Confirmed with MD, patient and RN pre procedure   Tunneled port placement by MD Ortega assisted by RT Cassie, Right chest and IJ access site;  End tidal CO2 range 33-38 during procedure   Port placed              BARD PowerPort ClearVUE Slim implantable port 8 fr 25 cm REF# [id number] LOT# [id number]   Patient tolerated procedure, hemodynamically stable; pt awake and talking post procedure; see flow sheet for vitals and post op print out    patient transported back to OP IR pod 1 via IR RN monitored

## 2018-06-28 NOTE — DISCHARGE INSTRUCTIONS
ACTIVITY: Rest and take it easy for the first 24 hours.  A responsible adult is recommended to remain with you during that time.  It is normal to feel sleepy.  We encourage you to not do anything that requires balance, judgment or coordination.    MILD FLU-LIKE SYMPTOMS ARE NORMAL. YOU MAY EXPERIENCE GENERALIZED MUSCLE ACHES, THROAT IRRITATION, HEADACHE AND/OR SOME NAUSEA.    FOR 24 HOURS DO NOT:  Drive, operate machinery or run household appliances.  Drink beer or alcoholic beverages.   Make important decisions or sign legal documents.    SPECIAL INSTRUCTIONS: No shower or bath for 1 week. Sponge Bath only.  Keep surgical site clean dry and covered until fully healed. Do not lift right arm above head for 1 week, Do not lift over 10 lbs for 1 week with right arm.     DIET: To avoid nausea, slowly advance diet as tolerated, avoiding spicy or greasy foods for the first day.  Add more substantial food to your diet according to your physician's instructions.  Babies can be fed formula or breast milk as soon as they are hungry.  INCREASE FLUIDS AND FIBER TO AVOID CONSTIPATION.    SURGICAL DRESSING/BATHING: No shower or bath for 1 week. Sponge Bath only.  Keep surgical site clean dry and covered until fully healed.    FOLLOW-UP APPOINTMENT:  A follow-up appointment should be arranged with your doctor in 1 week; call to schedule.    You should CALL YOUR PHYSICIAN if you develop:  Fever greater than 101 degrees F.  Pain not relieved by medication, or persistent nausea or vomiting.  Excessive bleeding (blood soaking through dressing) or unexpected drainage from the wound.  Extreme redness or swelling around the incision site, drainage of pus or foul smelling drainage.  Inability to urinate or empty your bladder within 8 hours.  Problems with breathing or chest pain.    You should call 911 if you develop problems with breathing or chest pain.  If you are unable to contact your doctor or surgical center, you should go to the  nearest emergency room or urgent care center.     If any questions arise, call your doctor.  If your doctor is not available, please feel free to call the Surgical Center at [phone].  The Center is open Monday through Friday from 7AM to 7PM.  You can also call the HEALTH HOTLINE open 24 hours/day, 7 days/week and speak to a nurse at [phone], or toll free at [phone].    A registered nurse may call you a few days after your surgery to see how you are doing after your procedure.    MEDICATIONS: Resume taking daily medication.  Take prescribed pain medication with food.  If no medication is prescribed, you may take non-aspirin pain medication if needed.  PAIN MEDICATION CAN BE VERY CONSTIPATING.  Take a stool softener or laxative such as senokot, pericolace, or milk of magnesia if needed.      If your physician has prescribed pain medication that includes Acetaminophen (Tylenol), do not take additional Acetaminophen (Tylenol) while taking the prescribed medication.    Depression / Suicide Risk    As you are discharged from this [facility] facility, it is important to learn how to keep safe from harming yourself.    Recognize the warning signs:  · Abrupt changes in personality, positive or negative- including increase in energy   · Giving away possessions  · Change in eating patterns- significant weight changes-  positive or negative  · Change in sleeping patterns- unable to sleep or sleeping all the time   · Unwillingness or inability to communicate  · Depression  · Unusual sadness, discouragement and loneliness  · Talk of wanting to die  · Neglect of personal appearance   · Rebelliousness- reckless behavior  · Withdrawal from people/activities they love  · Confusion- inability to concentrate     If you or a loved one observes any of these behaviors or has concerns about self-harm, here's what you can do:  · Talk about it- your feelings and reasons for harming yourself  · Remove any means that you  might use to hurt yourself (examples: pills, rope, extension cords, firearm)  · Get professional help from the community (Mental Health, Substance Abuse, psychological counseling)  · Do not be alone:Call your Safe Contact- someone whom you trust who will be there for you.  · Call your local CRISIS HOTLINE 443-4519 or 970-409-0644  · Call your local Children's Mobile Crisis Response Team [place] (164) 235-2200 or www.WP Engine  · Call the toll free National Suicide Prevention Hotlines   · National Suicide Prevention Lifeline 112-848-CLLG (7425)  · National Hope Line Network 800-SUICIDE (166-2138)      Implanted Port Home Guide  An implanted port is a type of central line that is placed under the skin. Central lines are used to provide IV access when treatment or nutrition needs to be given through a person's veins. Implanted ports are used for long-term IV access. An implanted port may be placed because:   · You need IV medicine that would be irritating to the small veins in your hands or arms.    · You need long-term IV medicines, such as antibiotics.    · You need IV nutrition for a long period.    · You need frequent blood draws for lab tests.    · You need dialysis.    Implanted ports are usually placed in the chest area, but they can also be placed in the upper arm, the abdomen, or the leg. An implanted port has two main parts:   · [place]. The reservoir is round and will appear as a small, raised area under your skin. The reservoir is the part where a needle is inserted to give medicines or draw blood.    · Catheter. The catheter is a thin, flexible tube that extends from the reservoir. The catheter is placed into a large vein. Medicine that is inserted into the reservoir goes into the catheter and then into the vein.    HOW WILL I CARE FOR MY INCISION SITE?  Do not get the incision site wet. Bathe or shower as directed by your health care provider.   HOW IS MY PORT ACCESSED?  Special steps must  "be taken to access the port:   · Before the port is accessed, a numbing cream can be placed on the skin. This helps numb the skin over the port site.    · Your health care provider uses a sterile technique to access the port.  ¨ Your health care provider must put on a mask and sterile gloves.  ¨ The skin over your port is cleaned carefully with an antiseptic and allowed to dry.  ¨ The port is gently pinched between sterile gloves, and a needle is inserted into the port.  · Only \"non-coring\" port needles should be used to access the port. Once the port is accessed, a blood return should be checked. This helps ensure that the port is in the vein and is not clogged.    · If your port needs to remain accessed for a constant infusion, a clear (transparent) bandage will be placed over the needle site. The bandage and needle will need to be changed every week, or as directed by your health care provider.    · Keep the bandage covering the needle clean and dry. Do not get it wet. Follow your health care provider's instructions on how to take a shower or bath while the port is accessed.    · If your port does not need to stay accessed, no bandage is needed over the port.    WHAT IS FLUSHING?  Flushing helps keep the port from getting clogged. Follow your health care provider's instructions on how and when to flush the port. Ports are usually flushed with saline solution or a medicine called heparin. The need for flushing will depend on how the port is used.   · If the port is used for intermittent medicines or blood draws, the port will need to be flushed:    ¨ After medicines have been given.    ¨ After blood has been drawn.    ¨ As part of routine maintenance.    · If a constant infusion is running, the port may not need to be flushed.    HOW LONG WILL MY PORT STAY IMPLANTED?  The port can stay in for as long as your health care provider thinks it is needed. When it is time for the port to come out, surgery will be done to " remove it. The procedure is similar to the one performed when the port was put in.   WHEN SHOULD I SEEK IMMEDIATE MEDICAL CARE?  When you have an implanted port, you should seek immediate medical care if:   · You notice a bad smell coming from the incision site.    · You have swelling, redness, or drainage at the incision site.    · You have more swelling or pain at the port site or the surrounding area.    · You have a fever that is not controlled with medicine.     This information is not intended to replace advice given to you by your health care provider. Make sure you discuss any questions you have with your health care provider.     Document Released: 12/18/2006 Document Revised: 10/08/2014 Document Reviewed: 08/25/2014  [employer] Interactive Patient Education ©2016 [employer]    Implanted Port Insertion, Care After  Refer to this sheet in the next few weeks. These instructions provide you with information on caring for yourself after your procedure. Your health care provider may also give you more specific instructions. Your treatment has been planned according to current medical practices, but problems sometimes occur. Call your health care provider if you have any problems or questions after your procedure.  WHAT TO EXPECT AFTER THE PROCEDURE  After your procedure, it is typical to have the following:   · Discomfort at the port insertion site. Ice packs to the area will help.  · Bruising on the skin over the port. This will subside in 3-4 days.  HOME CARE INSTRUCTIONS  · After your port is placed, you will get a 's information card. The card has information about your port. Keep this card with you at all times.    · Know what kind of port you have. There are many types of ports available.    · Wear a medical alert bracelet in case of an emergency. This can help alert health care workers that you have a port.    · The port can stay in for as long as your health care provider believes it is  necessary.    · A home health care nurse may give medicines and take care of the port.    · You or a family member can get special training and directions for giving medicine and taking care of the port at home.    SEEK MEDICAL CARE IF:   · Your port does not flush or you are unable to get a blood return.    · You have a fever or chills.  SEEK IMMEDIATE MEDICAL CARE IF:  · You have new fluid or pus coming from your incision.    · You notice a bad smell coming from your incision site.    · You have swelling, pain, or more redness at the incision or port site.    · You have chest pain or shortness of breath.     This information is not intended to replace advice given to you by your health care provider. Make sure you discuss any questions you have with your health care provider.     Document Released: 10/08/2014 Document Revised: 12/23/2014 Document Reviewed: 10/08/2014  [employer] Interactive Patient Education ©2016 [employer]

## 2018-08-20 NOTE — NON-PROVIDER
Patient was seen today in clinic with Dr. Wall for Follow up.  Vitals signs and weight were obtained and pain assessment was completed.  Allergies and medications were reviewed with the patient.  Toxicities of treatment assessed.     Vitals/Pain:  Vitals:    08/20/18 0749   BP: 144/65   Pulse: 92   Temp: 36.9 °C (98.5 °F)   SpO2: 98%   Weight: 70.8 kg (156 lb)   Pain Score: 5=Moderate Pain  Location: Head  Description: Constant, Aching        Allergies:   Patient has no known allergies.    Current Medications:  Current Outpatient Prescriptions   Medication Sig Dispense Refill   • levETIRAcetam (KEPPRA) 500 MG Tab Take 500 mg by mouth 2 times a day.     • cyclobenzaprine (FLEXERIL) 10 MG Tab Take 10 mg by mouth 3 times a day as needed.     • acetaminophen/caffeine/butalbital 325-40-50 mg (FIORICET) -40 MG Tab Take 1 Tab by mouth every 6 hours as needed for Headache. 30 Tab 0   • amLODIPine (NORVASC) 5 MG Tab Take 1 Tab by mouth every day. 30 Tab 0   • Cyanocobalamin (B-12 PO) Take 2 Tabs by mouth every day.     • ascorbic acid (ASCORBIC ACID) 500 MG Tab Take 500 mg by mouth every day.     • escitalopram (LEXAPRO) 20 MG tablet Take 20 mg by mouth every day.     • pantoprazole (PROTONIX) 40 MG Tablet Delayed Response Take 40 mg by mouth every day.       No current facility-administered medications for this encounter.          PCP:  [facility]

## 2018-08-20 NOTE — PROGRESS NOTES
RADIATION ONCOLOGY FOLLOW UP    DATE OF SERVICE: 8/20/2018    IDENTIFICATION:   Brain metastasis, four lesions identified right frontal, left parietal, left posterior inferior occipital, left anterior inferior occipital; status post craniotomy on April 16, 2018 of the dominant right frontal lesion; path favoring [id number] positive breast primary; remote history of breast cancer treated in 1998 with lumpectomy sentinel lymph node biopsy followed by adjuvant radiotherapy no chemotherapy status post radiosurgery to 2700 cGy in 3 fractions to the right frontal tumor bed in 2000 cGy in a single fraction to the left frontal, left parietal, and left occipital lesion completing in May 2018, with 3 new brain metastasis to right frontal and one left frontal treated with single isocenter multilesion radiosurgery to 2000 cGy in July 2018, currently receiving Herceptin and lapatinib discontinued from [facility] due to poor tolerance      INTERVAL HISTORY: I had the pleasure of seeing Ms. Crook today in follow up for her metastatic breast cancer with brain metastasis.  From a CNS perspective patient still has a headache on a daily basis which she would rate at a level of 5 but this is unchanged from her pretreatment level.  She still requires narcotic pain medication for this headache.  She does not have any new neurologic deficits or symptoms.  She denies any new constitutional symptoms.  Her recent CT scan of chest shows slight progression in the right hilar area abutting the right upper lobe bronchus.  Currently there is not any airway compromise.    PHYSICAL EXAM:    Vitals:    08/20/18 0749   BP: 144/65   Pulse: 92   Temp: 36.9 °C (98.5 °F)   SpO2: 98%   Weight: 70.8 kg (156 lb)   Pain Score: 5=Moderate Pain  Location: Head  Description: Constant, Aching      1= Restricted in physically strenuous activity, but ambulatory and able to carry out work of a light sedentary nature, e.g., light housework, office work.    GENERAL: No  apparent distress.  HEENT:  Pupils are equal, round, and reactive to light.  Extraocular muscles   are intact. Sclerae nonicteric.  Conjunctivae pink.  Oral cavity, tongue   protrudes midline.   NECK:  Supple without evidence of thyromegaly.  NODES:  No peripheral adenopathy of the neck, supraclavicular fossa or axillae   bilaterally.  LUNGS:  Clear to ascultation bilaterally   HEART:  Regular rate and rhythm.  No murmur appreciated  ABDOMEN:  Soft. No evidence of hepatosplenomegaly.  Positive bowel sounds.  EXTREMITIES:  Without Edema.  NEUROLOGIC:  Cranial nerves II through XII were intact. Normal stance and gait motor and sensory grossly within normal limits      IMPRESSION:   Brain metastasis, four lesions identified right frontal, left parietal, left posterior inferior occipital, left anterior inferior occipital; status post craniotomy on April 16, 2018 of the dominant right frontal lesion; path favoring [id number] positive breast primary; remote history of breast cancer treated in 1998 with lumpectomy sentinel lymph node biopsy followed by adjuvant radiotherapy no chemotherapy status post radiosurgery to 2700 cGy in 3 fractions to the right frontal tumor bed in 2000 cGy in a single fraction to the left frontal, left parietal, and left occipital lesion completing in May 2018, with 3 new brain metastasis to right frontal and one left frontal treated with single isocenter multilesion radiosurgery to 2000 cGy in July 2018, currently receiving Herceptin and lapatinib discontinued from [facility] due to poor tolerance    RECOMMENDATIONS:   I discussed the patient her findings over a 35 minute time period, 95% of that time dedicated to an ongoing survivorship plan.  For her CNS disease I will repeat her MRI scan the third week of September.  This will be 3 months from her last study in June.  Patient continues to have CNS symptoms in the form of headache, had to discontinue her [facility], and has subtle progression in her  non-CNS disease.  As for her chest disease, currently the right hilar lesion serves as the only area of dominant disease and progression.  I discussed with the patient the significance as it relates to the remainder of her systemic disease and thus attention on her systemic regimen with Dr. Noe.  If however he feels she is on the ideal regimen and would want to consider radiotherapy to the right hilum this would be amenable to radiotherapy.  I do not want to offer radiotherapy without Dr. Noe feeling this fits into his goals for her systemic therapy and ability to measure response.  I will plan on seeing her back after her MRI scan or we will discuss not only her brain disease but also next steps for her lung either in form of systemic therapy or radiotherapy integration.    If patient has any questions or concerns, she should feel free to contact me.

## 2018-09-10 NOTE — ED PROVIDER NOTES
ED Provider Note    CHIEF COMPLAINT  Chief Complaint   Patient presents with   • Pain     pain to head  Has new brain tumors, lung, and breast tumors   Pain meds not working for pain in head       HPI  Saadia Crook is a 68 y.o. female who presents for evaluation of head pain.  The patient has breast cancer that is metastatic to her brain.  She states that her most recent MRI showed 3 more brain lesions.  She is receiving radiation therapy for those.  She is here today because her pain medicine is not controlling her pain.  She denies any fevers.    REVIEW OF SYSTEMS  See HPI for further details. All other systems negative.    PAST MEDICAL HISTORY  Past Medical History:   Diagnosis Date   • Cancer (HCC) 1995    breast ca   • Cancer (HCC) 04/2018    Brain tumors,several   • Cataract     needs removal   • Diverticulitis    • Essential hypertension, benign 6/15/2016   • Hiatus hernia syndrome    • Indigestion    • Kidney stones    • Peptic ulcer     small antral ulcer found on EGD 2011   • Psychiatric problem     Anxiety; situational-no meds   • Seizure (HCC) 04/2018    related to brain tumors       FAMILY HISTORY  Family History   Problem Relation Age of Onset   • Cancer Mother         breast   • Hypertension Mother    • Stroke Father    • Heart Attack Neg Hx    • Heart Disease Neg Hx    • Heart Failure Neg Hx        SOCIAL HISTORY  Social History     Social History   • Marital status:      Spouse name: N/A   • Number of children: N/A   • Years of education: N/A     Social History Main Topics   • Smoking status: Light Tobacco Smoker     Packs/day: 0.25     Years: 30.00     Types: Cigarettes   • Smokeless tobacco: Never Used   • Alcohol use No   • Drug use: No   • Sexual activity: No     Other Topics Concern   • Not on file     Social History Narrative   • No narrative on file       SURGICAL HISTORY  Past Surgical History:   Procedure Laterality Date   • CRANIOTOMY STEALTH Right 4/16/2018    Procedure:  "CRANIOTOMY STEALTH-FRONTAL;  Surgeon: Cliff Masterson M.D.;  Location: SURGERY [facility];  Service: Neurosurgery   • BRONCHOSCOPY-ENDO N/A 4/11/2018    Procedure: BRONCHOSCOPY-ENDO;  Surgeon: Ken Crandall M.D.;  Location: ENDOSCOPY [facility];  Service: Pulmonary   • GASTROSCOPY WITH BIOPSY  10/11/2011    Performed by TASHIA CRAIG at ENDOSCOPY [facility]   • COLONOSCOPY WITH POLYP  10/11/2011    Performed by TASHIA CRAIG at [facility]   • LUMPECTOMY  1978    right   • ABDOMINAL EXPLORATION     • ABDOMINAL HYSTERECTOMY TOTAL      BSO   • APPENDECTOMY     • GYN SURGERY      hysterectomy   • OTHER      tonsilectomy   • TONSILLECTOMY AND ADENOIDECTOMY         CURRENT MEDICATIONS  Home Medications    **Home medications have not yet been reviewed for this encounter**         ALLERGIES  No Known Allergies    PHYSICAL EXAM  VITAL SIGNS: /76   Pulse 97   Temp 36.9 °C (98.5 °F)   Resp 18   Ht 1.727 m (5' 8\")   Wt 71 kg (156 lb 8.4 oz)   SpO2 96%   BMI 23.80 kg/m²   Constitutional: Well developed, Well nourished, No acute distress, Non-toxic appearance.   HENT: Normocephalic, Atraumatic.  Cardiovascular: Normal heart rate.   Thorax & Lungs: No respiratory distress.  Skin: Warm, Dry.  Neurologic: Awake and alert.    COURSE & MEDICAL DECISION MAKING  Pertinent Labs & Imaging studies reviewed. (See chart for details)  This is a 68-year-old here for evaluation of headache.  She has breast cancer that has metastasized to her brain.  She is receiving treatment.  She is now complaining of a worsening headache.  She appears neurologically intact.  She is treated with Dilaudid IV.  Upon repeat evaluation she states her headache is essentially gone at this point.  At this point I will provide her a prescription for morphine.  I requested that she contact her oncologist for follow-up recommendations.  She is given a discharge instruction sheet on brain tumors.    FINAL IMPRESSION  1.  " Headache  2.  Brain metastasis  3.         Electronically signed by: Quintin José, 9/10/2018 10:49 AM

## 2018-09-10 NOTE — ED NOTES
"Med rec complete per pt at bedside  Antibiotics taken in the last 30 days: none  Allergies verified and updated: NKDA        Comments:  Pt receives HERCEPTIN via port one time a week at the \"Cancer Center[facility] @ [facility].     "

## 2018-09-10 NOTE — DISCHARGE INSTRUCTIONS
"Brain Tumor  Brain tumors are diseases in which cancer (malignant) cells begin to grow in the tissues of the brain. Brain tumors account for 85% to 90% of all primary central nervous system (CNS) tumors. A \"primary\" brain tumor is one that starts in the brain, rather than spreading to the brain from a cancer in a different part of the body.  The brain controls memory, learning, senses (hearing, sight, smell, taste, touch), and emotion. It also controls other parts of the body, including muscles, organs, and blood vessels.   Most brain tumors occur in people age 45 or above. However, a couple rare tumors occur almost only in children.   Primary brain tumors are much less common than secondary brain tumors. A secondary brain tumor starts in a different part of the body and spreads to the brain.  CAUSES   Most primary brain tumors begin when normal cells somehow develop errors in their genetics. In most cases, it is not known how this process starts. These genetic errors allow cells to grow and divide at increased rates and to continue living when normal, healthy cells would die. The result is a clump of abnormal cells, which forms a tumor.   There are many different types of brain cells. In many cases, one type of brain cell develops the error that leads to a tumor being formed. The tumor is named according to the type of cell that developed the problem.  Environmental factors and infection, which may cause brain cancer, include:  · Exposure to chemicals and solvents. This could happen on the job or through hobbies. Examples include:   · Vinyl chloride.   · Pesticides.   · A variety of industrial chemicals.   Research is ongoing, because it is not known whether there is a true relationship between these chemicals and the development of primary brain tumors.  · Exposure of the head to radiation, such as during a nuclear accident.   · Jasen-Barr virus infection. This can cause a rare brain tumor.   · Being a " transplant recipient or patient with AIDS (acquired immunodeficiency syndrome).   SYMPTOMS   Symptoms of brain tumors are related to the part of the brain that is affected. For example, a brain tumor that affects the part of the brain controlling vision will affect that sense.  General signs and symptoms include:   · Headache. Changes in pattern, higher frequency, or more severe.   · Stomach or intestinal problems, such as:   · Nausea.   · Loss of appetite.   · Vomiting.   · Changes in:   · Personality.   · Mood.   · Mental capacity.   · Concentration.   · Balance problems.   · Confusion with everyday activities.   · Speech problems.   · Seizure in someone who has never had a seizure before. These may occur in up to 20% of patients with a tumor in the upper part of the brain. Seizures may be present for months before a clear diagnosis is made.   DIAGNOSIS   The diagnosis is based on:  · Patient history.   · A nervous system function test (neurological exam).   · Diagnostic procedures, including:   · CT scan (computed tomography) and MRI scan (magnetic resonance imaging).   · After therapy, SPECT (single photon emission computed tomography) and PET (positron emission tomography) may be useful to see if there is new tumor growth. Both of these tests are ways of making images of the brain. These tests are usually only available at large hospitals.   Once a brain tumor is found, more tests may be done to determine the type of tumor. Your caregiver will also need to know how different the tumor cells are from the cells that are near it. This is called the histologic grade of the tumor. To plan treatment, it is important to know the type and grade of brain tumor.  TREATMENT   Four kinds of direct treatment are available. The types selected depend on the type and location of the tumor.   · Surgery is the most common treatment. To take out the cancer from the brain, a surgeon will cut a part of bone from the skull to get to  the brain. This procedure is called a craniotomy. After the cancer is removed, the bone will be put back in place or a piece of metal or fabric will be used to cover the opening in the skull.   · Radiation therapy and radiosurgery use X-rays to kill cancer cells from the outside and to shrink tumors. Radiation therapy may also be used by putting materials that produce radiation (radioisotopes) through thin plastic tubes, into the tumor, to kill cancer cells from the inside.   · Chemotherapy uses drugs to kill cancer cells. Chemotherapy is called a systemic treatment because the drug enters the bloodstream, travels through the body, and can kill cancer cells throughout the body. Chemotherapy may be:   · Taken by pill.   · Put into the body by a needle in a vein or muscle.   · Biological therapy uses the body's immune system to fight cancer. It is being studied in clinical trials for use in the treatment of brain cancer. It uses materials made by the body, or made in a lab, to help the body's natural defenses against disease.   Rehabilitation After Treatment   Because brain tumors can develop in parts of the brain that control motor skills, speech, vision, and thinking, rehabilitation may be a necessary part of recovery. The brain can sometimes heal itself after treatment or trauma from a brain tumor, but this can take time and patience.   · Cognitive rehabilitation can help you cope with problems of thinking and awareness.   · Physical therapy can help you regain lost motor skills or muscle strength.   · Vocational therapy can help you get back to work after a brain tumor.   · Specialists in speech difficulties (speech pathologists) are just one of many types of therapists who can help you recover.   School-age children with brain tumors may especially benefit from tutoring as a part of their overall treatment plan. A brain tumor can cause changes in the brain that affect thinking and learning. The earlier these  problems are identified, the earlier they can be addressed with strategies that provide the most benefits to the child.   SEEK MEDICAL CARE IF:   · You feel there are side effects from medicines prescribed.   · You feel that medicines for pain are not being effective.   · You develop any new symptoms that you did not have previously.   SEEK IMMEDIATE MEDICAL CARE IF:   · You develop a high fever, neck stiffness, or confusion.   · You have a headache that becomes severe or does not respond to pain medicine.   · You have a fainting episode.   · You have a seizure.   · You develop a rash.   · You develop severe vomiting or are unable to tolerate food or fluids.   Document Released: 12/19/2005 Document Revised: 03/11/2013 Document Reviewed: 11/06/2008  [employer]® Patient Information ©2013 [employer], [employer].

## 2018-09-20 NOTE — PROGRESS NOTES
"RADIATION ONCOLOGY FOLLOW UP    DATE OF SERVICE: 9/20/2018    IDENTIFICATION:   Brain metastasis, four lesions identified right frontal, left parietal, left posterior inferior occipital, left anterior inferior occipital; status post craniotomy on April 16, 2018 of the dominant right frontal lesion; path favoring [id number] positive breast primary; remote history of breast cancer treated in 1998 with lumpectomy sentinel lymph node biopsy followed by adjuvant radiotherapy no chemotherapy status post radiosurgery to 2700 cGy in 3 fractions to the right frontal tumor bed in 2000 cGy in a single fraction to the left frontal, left parietal, and left occipital lesion completing in May 2018, with 3 new brain metastasis to right frontal and one left frontal treated with single isocenter multilesion radiosurgery to 2000 cGy in July 2018, currently receiving Herceptin and lapatinib discontinued from [facility] due to poor tolerance, now with multifocal brain metastasis    INTERVAL HISTORY: I had the pleasure of seeing Ms. Crook today in follow up for her metastatic breast cancer with brain metastasis.  Patient underwent restaging brain scan, prior to that brain scan she was exhibiting new headache but no focal neurologic deficits.  Unfortunately her scan shows continued progression of her CNS disease.  At this point the number and size of lesions and previous radiosurgery make her more consistent with a whole brain radiation treatment as opposed to focal radiosurgery.  She is here today to discuss the role of whole brain radiotherapy for her progression.  She states she is uncertain whether she wishes to continue on with systemic chemotherapy given the way she \"feels\" on chemotherapy and the continued progression of disease.  She would however like to proceed with whole brain radiotherapy before giving consideration to hospice as she is symptomatic from this currently.    PHYSICAL EXAM:    Vitals:    09/20/18 0826   BP: 144/67   BP " Location: Left arm   Patient Position: Sitting   Pulse: (!) 101   Temp: 36.9 °C (98.5 °F)   SpO2: 94%   Weight: 71.4 kg (157 lb 8 oz)   Pain Score: 8=Moderate-Severe Pain      1= Restricted in physically strenuous activity, but ambulatory and able to carry out work of a light sedentary nature, e.g., light housework, office work.      GENERAL: No apparent distress.  Appropriately tearful  HEENT:  Pupils are equal, round, and reactive to light.  Extraocular muscles   are intact. Sclerae nonicteric.  Conjunctivae pink.  Oral cavity, tongue   protrudes midline.   NECK:  Supple without evidence of thyromegaly.  NODES:  No peripheral adenopathy of the neck, supraclavicular fossa or axillae   bilaterally.  LUNGS:  Clear to ascultation bilaterally   HEART:  Regular rate and rhythm.  No murmur appreciated  ABDOMEN:  Soft. No evidence of hepatosplenomegaly.  Positive bowel sounds.  EXTREMITIES:  Without Edema.  NEUROLOGIC:  Cranial nerves II through XII were intact. Normal stance and gait motor and sensory grossly within normal limits      IMPRESSION:   Brain metastasis, four lesions identified right frontal, left parietal, left posterior inferior occipital, left anterior inferior occipital; status post craniotomy on April 16, 2018 of the dominant right frontal lesion; path favoring [id number] positive breast primary; remote history of breast cancer treated in 1998 with lumpectomy sentinel lymph node biopsy followed by adjuvant radiotherapy no chemotherapy status post radiosurgery to 2700 cGy in 3 fractions to the right frontal tumor bed in 2000 cGy in a single fraction to the left frontal, left parietal, and left occipital lesion completing in May 2018, with 3 new brain metastasis to right frontal and one left frontal treated with single isocenter multilesion radiosurgery to 2000 cGy in July 2018, currently receiving Herceptin and lapatinib discontinued from [facility] due to poor tolerance, now with continued brain  progression    RECOMMENDATIONS:   I discussed the diagnosis, prognosis, and treatment options over a 40 min time period, 95% of that time dedicated to ongoing treatment management.  I discussed with the patient and her daughter the pursuit of active therapy in the form of whole brain radiation versus hospice services and palliative care.  Patient states since she is symptomatic currently she would like to pursue whole brain radiotherapy.  She remains uncertain whether she wishes to pursue any further systemic therapy.  She was offered a simulation on today's visit but stated she would like to think over the weekend of her decision thus far and likely then proceed with whole brain radiation.  I anticipate 10 fractions to 3000 cGy.      If patient has any questions or concerns, she should feel free to contact me.

## 2018-09-20 NOTE — ADDENDUM NOTE
Encounter addended by: Cliff Wall M.D. on: 9/20/2018  2:14 PM<BR>    Actions taken: Problem List reviewed

## 2018-09-28 NOTE — ED NOTES
Assumed care of pt from EMS - see triage note by this RN for details. Changed to gown, hooked to monitor- VSS. R CW port access by assist RN Manuel using sterile technique, with labs drawn and sent. Fall precautions in place. Call bell in reach. Awaiting MD eval/orders.

## 2018-09-28 NOTE — ED NOTES
Pt sleeping in stretcher. Resp even and unlabored. NAD. Fall precautions in place. Call bell in reach. Ongoing monitoring.

## 2018-09-28 NOTE — ED PROVIDER NOTES
ED Provider Note    ED Provider Note    Primary care provider: Toñito Recinos M.D.  Means of arrival: POV  History obtained from: Patient  History limited by: None    CHIEF COMPLAINT  Chief Complaint   Patient presents with   • Chest Pain       HPI  Saadia Crook is a 68 y.o. female who presents to the Emergency Department presents with her daughter with a chief complaint of ataxia and chest pain.  She states her chest pain feels like mostly like she is hyperventilating.  She is not having the symptoms at this time.  She recently started whole brain radiation therapy as well as chemotherapy and is due to start another type of chemotherapy for breast cancer that has metastasized to her brain and to her lungs.  In April, she underwent resection of the largest tumor.  She has been doing well since then.  She is followed by Dr. Wall.  She does have chronic headaches associated with her metastases here.  But she took her medication this morning and right now she is feeling okay, from a pain standpoint.  No fevers.  No vomiting.  No diarrhea.  No abdominal pain.  No urinary complaints.  No shortness of breath.    REVIEW OF SYSTEMS  Review of Systems   Constitutional: Negative for chills and fever.   HENT: Negative for congestion.    Respiratory: Negative for cough.    Cardiovascular: Positive for chest pain.   Gastrointestinal: Negative for abdominal pain, nausea and vomiting.   Genitourinary: Negative for dysuria.   Musculoskeletal: Negative for back pain.   Neurological: Positive for headaches.   All other systems reviewed and are negative.      PAST MEDICAL HISTORY   has a past medical history of Cancer ([facility]) (1995); Cancer ([facility]) (04/2018); Cataract; Diverticulitis; Essential hypertension, benign (6/15/2016); Hiatus hernia syndrome; Indigestion; Kidney stones; Peptic ulcer; Psychiatric problem; and Seizure ([facility]) (04/2018).    SURGICAL HISTORY   has a past surgical history that includes other;  "gastroscopy with biopsy (10/11/2011); colonoscopy with polyp (10/11/2011); abdominal exploration; appendectomy; tonsillectomy and adenoidectomy; lumpectomy (1978); bronchoscopy-endo (N/A, 4/11/2018); craniotomy stealth (Right, 4/16/2018); gyn surgery; and abdominal hysterectomy total.    SOCIAL HISTORY  Social History   Substance Use Topics   • Smoking status: Light Tobacco Smoker     Packs/day: 0.25     Years: 30.00     Types: Cigarettes   • Smokeless tobacco: Never Used   • Alcohol use No      History   Drug Use No       FAMILY HISTORY  Family History   Problem Relation Age of Onset   • Cancer Mother         breast   • Hypertension Mother    • Stroke Father    • Heart Attack Neg Hx    • Heart Disease Neg Hx    • Heart Failure Neg Hx        CURRENT MEDICATIONS  Home Medications     Reviewed by Florida Weinberg R.N. (Registered Nurse) on 09/28/18 at 0720  Med List Status: Partial   Medication Last Dose Status   acetaminophen/caffeine/butalbital 325-40-50 mg (FIORICET) -40 MG Tab 9/10/2018 Active   amLODIPine (NORVASC) 5 MG Tab 9/9/2018 Active   ascorbic acid (ASCORBIC ACID) 500 MG Tab 9/9/2018 Active   Cyanocobalamin (B-12 PO) 9/9/2018 Active   cyclobenzaprine (FLEXERIL) 10 MG Tab 9/10/2018 Active   escitalopram (LEXAPRO) 20 MG tablet 6/27/2018 Active   levETIRAcetam (KEPPRA) 500 MG Tab 9/10/2018 Active   MORPHINE SULFATE PO  Active   oxyCODONE-acetaminophen (PERCOCET-10)  MG Tab 9/10/2018 Active   pantoprazole (PROTONIX) 40 MG Tablet Delayed Response 9/9/2018 Active                ALLERGIES  No Known Allergies    PHYSICAL EXAM  VITAL SIGNS: /72   Pulse 90   Temp 36.9 °C (98.4 °F)   Resp 19   Ht 1.727 m (5' 8\")   Wt 77 kg (169 lb 12.1 oz)   SpO2 96%   BMI 25.81 kg/m²   Vitals reviewed.  Constitutional: Patient is oriented to person, place, and time. Appears well-developed and well-nourished. mild distress.  Anxious, tearful  Head: Normocephalic and atraumatic.   Ears: Normal external ears " bilaterally.   Mouth/Throat: Oropharynx is clear and moist  Eyes: Conjunctivae are normal. Pupils are equal, round, and reactive to light.   Neck: Normal range of motion. Neck supple.  Cardiovascular: Normal rate, regular rhythm and normal heart sounds.  Pulmonary/Chest: Effort normal and breath sounds normal. No respiratory distress, no wheezes, rhonchi.  Basilar Rales bilaterally.  Abdominal: Soft. Bowel sounds are normal. There is no tenderness. No rebound or guarding, or peritoneal signs.  No CVA tenderness  Musculoskeletal: No edema and no tenderness.   Neurological: No focal deficits.   Skin: Skin is warm and dry. No erythema. No pallor.   Psychiatric: Patient has a normal mood and affect.     LABS  Results for orders placed or performed during the hospital encounter of 09/28/18   CBC w/ Differential   Result Value Ref Range    WBC 10.4 4.8 - 10.8 K/uL    RBC 3.98 (L) 4.20 - 5.40 M/uL    Hemoglobin 12.2 12.0 - 16.0 g/dL    Hematocrit 37.1 37.0 - 47.0 %    MCV 93.2 81.4 - 97.8 fL    MCH 30.7 27.0 - 33.0 pg    MCHC 32.9 (L) 33.6 - 35.0 g/dL    RDW 47.3 35.9 - 50.0 fL    Platelet Count 498 (H) 164 - 446 K/uL    MPV 8.3 (L) 9.0 - 12.9 fL    Neutrophils-Polys 65.90 44.00 - 72.00 %    Lymphocytes 22.10 22.00 - 41.00 %    Monocytes 8.30 0.00 - 13.40 %    Eosinophils 2.30 0.00 - 6.90 %    Basophils 0.60 0.00 - 1.80 %    Immature Granulocytes 0.80 0.00 - 0.90 %    Nucleated RBC 0.00 /100 WBC    Neutrophils (Absolute) 6.86 2.00 - 7.15 K/uL    Lymphs (Absolute) 2.30 1.00 - 4.80 K/uL    Monos (Absolute) 0.86 (H) 0.00 - 0.85 K/uL    Eos (Absolute) 0.24 0.00 - 0.51 K/uL    Baso (Absolute) 0.06 0.00 - 0.12 K/uL    Immature Granulocytes (abs) 0.08 0.00 - 0.11 K/uL    NRBC (Absolute) 0.00 K/uL   Complete Metabolic Panel (CMP)   Result Value Ref Range    Sodium 138 135 - 145 mmol/L    Potassium 3.7 3.6 - 5.5 mmol/L    Chloride 106 96 - 112 mmol/L    Co2 23 20 - 33 mmol/L    Anion Gap 9.0 0.0 - 11.9    Glucose 91 65 - 99 mg/dL     Bun 7 (L) 8 - 22 mg/dL    Creatinine 0.58 0.50 - 1.40 mg/dL    Calcium 9.6 8.5 - 10.5 mg/dL    AST(SGOT) 12 12 - 45 U/L    ALT(SGPT) 9 2 - 50 U/L    Alkaline Phosphatase 107 (H) 30 - 99 U/L    Total Bilirubin 0.2 0.1 - 1.5 mg/dL    Albumin 3.8 3.2 - 4.9 g/dL    Total Protein 7.2 6.0 - 8.2 g/dL    Globulin 3.4 1.9 - 3.5 g/dL    A-G Ratio 1.1 g/dL   Troponin STAT   Result Value Ref Range    Troponin I <0.01 0.00 - 0.04 ng/mL   ESTIMATED GFR   Result Value Ref Range    GFR If African American >60 >60 mL/min/1.73 m 2    GFR If Non African American >60 >60 mL/min/1.73 m 2   EKG (Now)   Result Value Ref Range    Report       [facility] Emergency Dept.    Test Date:  2018  Pt Name:    JENNA CASTRO                 Department: ER  MRN:        [id number]                      Room:        10  Gender:     Female                       Technician: GET  :        1950                   Requested By:ER TRIAGE PROTOCOL  Order #:    [id number]                    Reading MD:    Measurements  Intervals                                Axis  Rate:       86                           P:          17  NC:         164                          QRS:        -5  QRSD:       88                           T:          16  QT:         384  QTc:        460    Interpretive Statements  SINUS RHYTHM  BORDERLINE LOW VOLTAGE IN FRONTAL LEADS  Compared to ECG 04/10/2018 09:09:19  Left-axis deviation no longer present         All labs reviewed by me.    EKG Interpretation  Interpreted by me    Rhythm: normal sinus   Rate: 86  Axis: normal  Ectopy: none  Conduction: normal  ST Segments: no acute change  T Waves: no acute change  Q Waves: none    Clinical Impression: Comparison made to prior EKG from April of this year.  No acute changes noted.  No acute changes and normal EKG    RADIOLOGY  DX-CHEST-PORTABLE (1 VIEW)   Final Result      1.  RIGHT hilar mass as previously seen on CT   2.  RIGHT upper lobe opacities could  represent some combination of metastatic disease, atelectasis and pneumonia        The radiologist's interpretation of all radiological studies have been reviewed by me.    COURSE & MEDICAL DECISION MAKING  Pertinent Labs & Imaging studies reviewed. (See chart for details)    Obtained and reviewed past medical records.  Patient's last encounter in the emergency department was September 10 seen for headache.  Patient has a history of metastatic breast cancer.  It has spread to her brain and lungs.  Recent outpatient notes from Dr. Patricio vazquez, radiation oncology pain to noted to have multiple areas of brain metastases.  She is status post craniotomy on April 16 of this year.  Remote history of breast cancer from 1998.  Given spread, it was recommended patient have whole brain radiation.  At that time, she denied continuation of chemotherapy due to side effects and the way that made her feel.    7:26 AM - Patient seen and examined at bedside.  This is, unfortunately, a 68-year-old female who presents with ataxia.  Her daughter noticed it yesterday.  She had some running, she states she simply cannot walk in a straight line.  She was having some chest pain but that since resolved.  She states it was really related to hyperventilation.  She has chronic headaches related to her metastatic disease.  Read together, reviewed recent MRI which was done on September 18 which showed progression of disease.  She recently, started whole brain radiation.  She is on chemotherapy and she is due to start another type of chemotherapy here soon.  Apparently, Dr. Patricio BENZ, has spoken with her about hospice and palliative care although the patient has some questions related to that and she did not seem to have a good grasp on her prognosis.    9:47 AM Dr. Wall paged.     10:27 AM janneth with Dr. Wall, the patient's radiation oncologist.  He does not suspect, symptoms are related to her 2 treatments of whole brain radiation more  likely, progression of underlying disease.  He does state, that she is in the likely end stages of her cancer care.  They have tried other things which have not been significantly helpful and she is not a candidate for surgery at this time.  He does not manage the medicine oncology portion of this and he does encourage the patient, to contact the medical oncologist, for starting chemotherapy if that is what she chooses to pursue.  We discussed likely starting the patient on an antibiotic as it does appear to be some mild infiltrative changes in her right upper lobe.  No new masses noted.    Patient was again reevaluated the bedside.  We discussed chest x-ray findings and my discussion with Dr. Wall, in agreement with plan for course of antibiotics.  He encouraged her to see her medical oncologist for possible medical chemotherapy initiation.  I have advised her, that this is likely progression of her underlying disease and not related to her whole brain radiation.  Patient has normal vital signs.  Unfortunately, she has a chronic, terminal condition.  She can continue to talk with her physicians about palliative and hospice care should that be her decision.  We did talk briefly about possibly, discontinuing all medications with the supposition, that she may feel better.  I did not give her definitive recommendations for this.    Patient will be discharged home in stable but guarded condition.      FINAL IMPRESSION  1. Metastatic disease (HCC)    2. Malignant neoplasm of female breast, unspecified estrogen receptor status, unspecified laterality, unspecified site of breast (HCC)    3. Infiltrate noted on imaging study

## 2018-09-28 NOTE — ED TRIAGE NOTES
"BIB [facility] from home. C/o L sided CP x 0400. No NV or diaphoresis.  \"A little\" SOB. Hx primary breast CA with mets to lungs and brain. Last radiation yesterday, last chemo 2 weeks ago (infusion, they are switching chemo drugs now). No cough or fevers   "

## 2018-09-28 NOTE — ED NOTES
LUISITO. R CW port deaccess per protocol with heparin flush.   Pt given DC instructions, including medication education, with verbalized understanding. Daughter called to pick pt up per pt's request. Awaiting ride home

## 2018-09-28 NOTE — DISCHARGE INSTRUCTIONS
Subtle findings on chest x-ray, suggest possible early pneumonia.  For this reason, he will be placed on antibiotics.  It is important that you follow-up with Dr. Wall as well as the oncologist, managing the medical portion of your cancer care.

## 2019-01-01 ENCOUNTER — HOSPITAL ENCOUNTER (INPATIENT)
Facility: MEDICAL CENTER | Age: 69
LOS: 1 days | DRG: 189 | End: 2019-01-19
Attending: INTERNAL MEDICINE | Admitting: INTERNAL MEDICINE
Payer: COMMERCIAL

## 2019-01-01 ENCOUNTER — HOME CARE VISIT (OUTPATIENT)
Dept: HOSPICE | Facility: HOSPICE | Age: 69
End: 2019-01-01
Payer: MEDICARE

## 2019-01-01 ENCOUNTER — APPOINTMENT (OUTPATIENT)
Dept: HOSPICE | Facility: HOSPICE | Age: 69
End: 2019-01-01
Payer: MEDICARE

## 2019-01-01 ENCOUNTER — HOSPITAL ENCOUNTER (OUTPATIENT)
Facility: MEDICAL CENTER | Age: 69
End: 2019-01-18
Attending: EMERGENCY MEDICINE | Admitting: HOSPITALIST
Payer: MEDICARE

## 2019-01-01 VITALS
SYSTOLIC BLOOD PRESSURE: 111 MMHG | BODY MASS INDEX: 26.37 KG/M2 | HEIGHT: 68 IN | DIASTOLIC BLOOD PRESSURE: 80 MMHG | RESPIRATION RATE: 14 BRPM | OXYGEN SATURATION: 81 % | HEART RATE: 113 BPM | WEIGHT: 174 LBS | TEMPERATURE: 97.4 F

## 2019-01-01 VITALS — HEART RATE: 57 BPM | SYSTOLIC BLOOD PRESSURE: 100 MMHG | RESPIRATION RATE: 18 BRPM | DIASTOLIC BLOOD PRESSURE: 60 MMHG

## 2019-01-01 VITALS — HEART RATE: 88 BPM | DIASTOLIC BLOOD PRESSURE: 72 MMHG | SYSTOLIC BLOOD PRESSURE: 115 MMHG | RESPIRATION RATE: 18 BRPM

## 2019-01-01 VITALS — SYSTOLIC BLOOD PRESSURE: 111 MMHG | RESPIRATION RATE: 16 BRPM | DIASTOLIC BLOOD PRESSURE: 67 MMHG | HEART RATE: 78 BPM

## 2019-01-01 VITALS — HEART RATE: 111 BPM

## 2019-01-01 VITALS
OXYGEN SATURATION: 52 % | HEART RATE: 111 BPM | TEMPERATURE: 97.3 F | WEIGHT: 174.16 LBS | BODY MASS INDEX: 26.4 KG/M2 | DIASTOLIC BLOOD PRESSURE: 62 MMHG | RESPIRATION RATE: 11 BRPM | HEIGHT: 68 IN | SYSTOLIC BLOOD PRESSURE: 105 MMHG

## 2019-01-01 VITALS — RESPIRATION RATE: 16 BRPM | SYSTOLIC BLOOD PRESSURE: 113 MMHG | DIASTOLIC BLOOD PRESSURE: 67 MMHG | HEART RATE: 78 BPM

## 2019-01-01 VITALS — RESPIRATION RATE: 16 BRPM | HEART RATE: 78 BPM | DIASTOLIC BLOOD PRESSURE: 67 MMHG | SYSTOLIC BLOOD PRESSURE: 115 MMHG

## 2019-01-01 VITALS — RESPIRATION RATE: 18 BRPM | HEART RATE: 98 BPM | DIASTOLIC BLOOD PRESSURE: 67 MMHG | SYSTOLIC BLOOD PRESSURE: 108 MMHG

## 2019-01-01 DIAGNOSIS — C79.31 BRAIN METASTASIS: ICD-10-CM

## 2019-01-01 PROCEDURE — 700101 HCHG RX REV CODE 250: Performed by: INTERNAL MEDICINE

## 2019-01-01 PROCEDURE — G0299 HHS/HOSPICE OF RN EA 15 MIN: HCPCS

## 2019-01-01 PROCEDURE — 304561 HCHG STAT O2

## 2019-01-01 PROCEDURE — S9126 HOSPICE CARE, IN THE HOME, P: HCPCS

## 2019-01-01 PROCEDURE — G0155 HHCP-SVS OF CSW,EA 15 MIN: HCPCS

## 2019-01-01 PROCEDURE — 96374 THER/PROPH/DIAG INJ IV PUSH: CPT

## 2019-01-01 PROCEDURE — 99285 EMERGENCY DEPT VISIT HI MDM: CPT

## 2019-01-01 PROCEDURE — 700111 HCHG RX REV CODE 636 W/ 250 OVERRIDE (IP): Performed by: INTERNAL MEDICINE

## 2019-01-01 PROCEDURE — 96375 TX/PRO/DX INJ NEW DRUG ADDON: CPT

## 2019-01-01 PROCEDURE — 6650990 HC HOSPICE AND HOME CARE RX REV CODE 0250

## 2019-01-01 PROCEDURE — G0378 HOSPITAL OBSERVATION PER HR: HCPCS

## 2019-01-01 PROCEDURE — T2045 HOSPICE GENERAL CARE: HCPCS

## 2019-01-01 PROCEDURE — 700111 HCHG RX REV CODE 636 W/ 250 OVERRIDE (IP): Performed by: EMERGENCY MEDICINE

## 2019-01-01 PROCEDURE — 99220 PR INITIAL OBSERVATION CARE,LEVL III: CPT | Performed by: HOSPITALIST

## 2019-01-01 PROCEDURE — 700111 HCHG RX REV CODE 636 W/ 250 OVERRIDE (IP): Performed by: HOSPITALIST

## 2019-01-01 PROCEDURE — 770004 HCHG ROOM/CARE - ONCOLOGY PRIVATE *

## 2019-01-01 RX ORDER — GLYCOPYRROLATE 0.2 MG/ML
0.2 INJECTION INTRAMUSCULAR; INTRAVENOUS EVERY 4 HOURS PRN
Status: DISCONTINUED | OUTPATIENT
Start: 2019-01-01 | End: 2019-01-01 | Stop reason: HOSPADM

## 2019-01-01 RX ORDER — ONDANSETRON 2 MG/ML
4 INJECTION INTRAMUSCULAR; INTRAVENOUS ONCE
Status: COMPLETED | OUTPATIENT
Start: 2019-01-01 | End: 2019-01-01

## 2019-01-01 RX ORDER — ATROPINE SULFATE 10 MG/ML
2 SOLUTION/ DROPS OPHTHALMIC EVERY 4 HOURS PRN
Status: DISCONTINUED | OUTPATIENT
Start: 2019-01-01 | End: 2019-01-01 | Stop reason: HOSPADM

## 2019-01-01 RX ORDER — LORAZEPAM 2 MG/ML
1-3 INJECTION INTRAMUSCULAR
Status: DISCONTINUED | OUTPATIENT
Start: 2019-01-01 | End: 2019-01-01 | Stop reason: HOSPADM

## 2019-01-01 RX ORDER — MORPHINE SULFATE 10 MG/ML
5 INJECTION, SOLUTION INTRAMUSCULAR; INTRAVENOUS
Status: DISCONTINUED | OUTPATIENT
Start: 2019-01-01 | End: 2019-01-01 | Stop reason: HOSPADM

## 2019-01-01 RX ORDER — MORPHINE SULFATE 10 MG/ML
10 INJECTION, SOLUTION INTRAMUSCULAR; INTRAVENOUS
Status: DISCONTINUED | OUTPATIENT
Start: 2019-01-01 | End: 2019-01-01 | Stop reason: HOSPADM

## 2019-01-01 RX ORDER — MORPHINE SULFATE 100 MG/5ML
10 SOLUTION ORAL
Status: DISCONTINUED | OUTPATIENT
Start: 2019-01-01 | End: 2019-01-01 | Stop reason: HOSPADM

## 2019-01-01 RX ORDER — LORAZEPAM 2 MG/ML
1 INJECTION INTRAMUSCULAR
Status: DISCONTINUED | OUTPATIENT
Start: 2019-01-01 | End: 2019-01-01 | Stop reason: HOSPADM

## 2019-01-01 RX ORDER — ONDANSETRON 2 MG/ML
4 INJECTION INTRAMUSCULAR; INTRAVENOUS EVERY 6 HOURS PRN
Status: DISCONTINUED | OUTPATIENT
Start: 2019-01-01 | End: 2019-01-01 | Stop reason: HOSPADM

## 2019-01-01 RX ORDER — LORAZEPAM 2 MG/ML
1 CONCENTRATE ORAL
Status: DISCONTINUED | OUTPATIENT
Start: 2019-01-01 | End: 2019-01-01 | Stop reason: HOSPADM

## 2019-01-01 RX ORDER — POLYVINYL ALCOHOL 14 MG/ML
2 SOLUTION/ DROPS OPHTHALMIC EVERY 6 HOURS PRN
Status: DISCONTINUED | OUTPATIENT
Start: 2019-01-01 | End: 2019-01-01 | Stop reason: HOSPADM

## 2019-01-01 RX ORDER — ONDANSETRON 2 MG/ML
8 INJECTION INTRAMUSCULAR; INTRAVENOUS EVERY 8 HOURS PRN
Status: DISCONTINUED | OUTPATIENT
Start: 2019-01-01 | End: 2019-01-01 | Stop reason: HOSPADM

## 2019-01-01 RX ORDER — DOCUSATE SODIUM 100 MG/1
100 CAPSULE, LIQUID FILLED ORAL EVERY 12 HOURS
Status: DISCONTINUED | OUTPATIENT
Start: 2019-01-01 | End: 2019-01-01 | Stop reason: HOSPADM

## 2019-01-01 RX ORDER — POLYVINYL ALCOHOL 14 MG/ML
2 SOLUTION/ DROPS OPHTHALMIC PRN
Status: DISCONTINUED | OUTPATIENT
Start: 2019-01-01 | End: 2019-01-01 | Stop reason: HOSPADM

## 2019-01-01 RX ORDER — MORPHINE SULFATE 10 MG/ML
5-10 INJECTION, SOLUTION INTRAMUSCULAR; INTRAVENOUS
Status: DISCONTINUED | OUTPATIENT
Start: 2019-01-01 | End: 2019-01-01

## 2019-01-01 RX ORDER — ONDANSETRON 4 MG/1
8 TABLET, ORALLY DISINTEGRATING ORAL EVERY 8 HOURS PRN
Status: DISCONTINUED | OUTPATIENT
Start: 2019-01-01 | End: 2019-01-01 | Stop reason: HOSPADM

## 2019-01-01 RX ADMIN — MORPHINE SULFATE 5 MG: 10 INJECTION, SOLUTION INTRAMUSCULAR; INTRAVENOUS at 12:05

## 2019-01-01 RX ADMIN — MORPHINE SULFATE 5 MG: 10 INJECTION, SOLUTION INTRAMUSCULAR; INTRAVENOUS at 17:23

## 2019-01-01 RX ADMIN — MORPHINE SULFATE 10 MG: 10 INJECTION, SOLUTION INTRAMUSCULAR; INTRAVENOUS at 11:09

## 2019-01-01 RX ADMIN — MORPHINE SULFATE 7 MG: 10 INJECTION, SOLUTION INTRAMUSCULAR; INTRAVENOUS at 10:05

## 2019-01-01 RX ADMIN — MORPHINE SULFATE 5 MG: 10 INJECTION, SOLUTION INTRAMUSCULAR; INTRAVENOUS at 02:27

## 2019-01-01 RX ADMIN — MORPHINE SULFATE 5 MG: 10 INJECTION, SOLUTION INTRAMUSCULAR; INTRAVENOUS at 06:05

## 2019-01-01 RX ADMIN — MORPHINE SULFATE 5 MG: 10 INJECTION, SOLUTION INTRAMUSCULAR; INTRAVENOUS at 22:05

## 2019-01-01 RX ADMIN — MORPHINE SULFATE 5 MG: 10 INJECTION INTRAVENOUS at 05:15

## 2019-01-01 RX ADMIN — MORPHINE SULFATE 5 MG: 10 INJECTION, SOLUTION INTRAMUSCULAR; INTRAVENOUS at 19:49

## 2019-01-01 RX ADMIN — MORPHINE SULFATE 5 MG: 10 INJECTION, SOLUTION INTRAMUSCULAR; INTRAVENOUS at 05:51

## 2019-01-01 RX ADMIN — ONDANSETRON HYDROCHLORIDE 4 MG: 2 INJECTION, SOLUTION INTRAMUSCULAR; INTRAVENOUS at 12:05

## 2019-01-01 RX ADMIN — MORPHINE SULFATE 5 MG: 10 INJECTION, SOLUTION INTRAMUSCULAR; INTRAVENOUS at 08:12

## 2019-01-01 RX ADMIN — LORAZEPAM 2 MG: 2 INJECTION INTRAMUSCULAR at 11:14

## 2019-01-01 RX ADMIN — ONDANSETRON 4 MG: 2 INJECTION INTRAMUSCULAR; INTRAVENOUS at 05:15

## 2019-01-01 SDOH — ECONOMIC STABILITY: GENERAL

## 2019-01-01 SDOH — ECONOMIC STABILITY: HOUSING INSECURITY: UNSAFE COOKING RANGE AREA: 0

## 2019-01-01 SDOH — ECONOMIC STABILITY: HOUSING INSECURITY: UNSAFE APPLIANCES: 0

## 2019-01-01 SDOH — ECONOMIC STABILITY: HOUSING INSECURITY: EVIDENCE OF SMOKING MATERIAL: 1

## 2019-01-01 ASSESSMENT — ENCOUNTER SYMPTOMS
FATIGUES EASILY: 1
DESCRIPTION OF MEMORY LOSS: SHORT TERM
SLEEP QUALITY: FAIR
SHORTNESS OF BREATH: 1
CHILLS: 0
BOWEL PATTERN NORMAL: 1
COUGH: 0
SLEEP QUALITY: FAIR
STOOL FREQUENCY: LESS THAN DAILY
FATIGUE: 1
FLANK PAIN: 0
FATIGUES EASILY: 1
SLEEP QUALITY: FAIR
SHORTNESS OF BREATH: 1
FOCAL WEAKNESS: 0
DESCRIPTION OF MEMORY LOSS: SHORT TERM
BOWEL PATTERN NORMAL: 1
LOWER EXTREMITY EDEMA: 1
HEMOPTYSIS: 0
STOOL FREQUENCY: LESS THAN DAILY
FORGETFULNESS: 1
DIZZINESS: 0
LOWER EXTREMITY EDEMA: 1
ABDOMINAL PAIN: 1
SPEECH CHANGE: 0
FORGETFULNESS: 1
FATIGUES EASILY: 1
LAST BOWEL MOVEMENT: 65013
FORGETFULNESS: 1
DYSPNEA ON EXERTION: 1
LOWER EXTREMITY EDEMA: 1
LOWER EXTREMITY EDEMA: 1
FATIGUES EASILY: 1
BOWEL PATTERN NORMAL: 1
DEPRESSION: 0
SENSORY CHANGE: 0
VOMITING: 0
FORGETFULNESS: 1
BLURRED VISION: 0
FATIGUE: 1
STOOL FREQUENCY: LESS THAN DAILY
MYALGIAS: 0
FATIGUE: 1
STOOL FREQUENCY: DAILY
FATIGUE: 1
FORGETFULNESS: 1
DYSPNEA ACTIVITY LEVEL: AFTER AMBULATING 10 - 20 FT
HALLUCINATIONS: 0
EYE DISCHARGE: 0
FATIGUE: 1
WEAKNESS: 1
LAST BOWEL MOVEMENT: 65026
SLEEP QUALITY: FAIR
FEVER: 0
DYSPNEA ON EXERTION: 1
LAST BOWEL MOVEMENT: 65020
DYSPNEA ACTIVITY LEVEL: AFTER AMBULATING MORE THAN 20 FT
FATIGUE: 1
DYSPNEA ON EXERTION: 1
HEARTBURN: 0
DESCRIPTION OF MEMORY LOSS: SHORT TERM
LAST BOWEL MOVEMENT: 65022
STOOL FREQUENCY: LESS THAN DAILY
PALPITATIONS: 0
BRUISES/BLEEDS EASILY: 0
LAST BOWEL MOVEMENT: 65015
DESCRIPTION OF MEMORY LOSS: SHORT TERM
DYSPNEA ON EXERTION: 1
BOWEL PATTERN NORMAL: 1
LOWER EXTREMITY EDEMA: 1
NAUSEA: 0
SLEEP QUALITY: FAIR
DOUBLE VISION: 0
SHORTNESS OF BREATH: 1
DESCRIPTION OF MEMORY LOSS: SHORT TERM
BOWEL PATTERN NORMAL: 1

## 2019-01-01 ASSESSMENT — COGNITIVE AND FUNCTIONAL STATUS - GENERAL
MOBILITY SCORE: 6
PERSONAL GROOMING: TOTAL
MOVING FROM LYING ON BACK TO SITTING ON SIDE OF FLAT BED: UNABLE
WALKING IN HOSPITAL ROOM: TOTAL
DRESSING REGULAR UPPER BODY CLOTHING: TOTAL
SUGGESTED CMS G CODE MODIFIER DAILY ACTIVITY: CN
TURNING FROM BACK TO SIDE WHILE IN FLAT BAD: UNABLE
HELP NEEDED FOR BATHING: TOTAL
STANDING UP FROM CHAIR USING ARMS: TOTAL
DAILY ACTIVITIY SCORE: 6
CLIMB 3 TO 5 STEPS WITH RAILING: TOTAL
SUGGESTED CMS G CODE MODIFIER MOBILITY: CN
TOILETING: TOTAL
MOVING TO AND FROM BED TO CHAIR: UNABLE
DRESSING REGULAR LOWER BODY CLOTHING: TOTAL
EATING MEALS: TOTAL

## 2019-01-01 ASSESSMENT — PAIN SCALES - GENERAL
PAINLEVEL_OUTOF10: 0
PAINLEVEL_OUTOF10: 0
PAINLEVEL_OUTOF10: 4
PAINLEVEL_OUTOF10: 0

## 2019-01-01 ASSESSMENT — SOCIAL DETERMINANTS OF HEALTH (SDOH)
ACTIVE STRESSOR - NO STRESS FACTORS: 1
ACTIVE STRESSOR - EXHAUSTION: 1
ACTIVE STRESSOR - NO STRESS FACTORS: 1
ACTIVE STRESSOR - STRAINED FAMILY RELATIONSHIP: 1
ACTIVE STRESSOR - NO STRESS FACTORS: 1

## 2019-01-01 ASSESSMENT — ACTIVITIES OF DAILY LIVING (ADL)
MONEY MANAGEMENT (EXPENSES/BILLS): TOTALLY DEPENDENT

## 2019-01-01 ASSESSMENT — LIFESTYLE VARIABLES
EVER_SMOKED: NEVER
SUBSTANCE_ABUSE: 0

## 2019-01-01 ASSESSMENT — PATIENT HEALTH QUESTIONNAIRE - PHQ9
SUM OF ALL RESPONSES TO PHQ9 QUESTIONS 1 AND 2: 0
1. LITTLE INTEREST OR PLEASURE IN DOING THINGS: NOT AT ALL
2. FEELING DOWN, DEPRESSED, IRRITABLE, OR HOPELESS: NOT AT ALL

## 2019-01-18 PROBLEM — J96.01 ACUTE HYPOXEMIC RESPIRATORY FAILURE (HCC): Status: ACTIVE | Noted: 2019-01-01

## 2019-01-18 PROBLEM — Z51.5 COMFORT MEASURES ONLY STATUS: Status: ACTIVE | Noted: 2019-01-01

## 2019-01-18 PROBLEM — L89.90 BED SORE: Status: ACTIVE | Noted: 2019-01-01

## 2019-01-18 NOTE — ED NOTES
Left message for pt's sister (pt requested I call her at 7am).  Pt is sleeping, oxygen saturation is 60%, pt requesting to leave NRB mask on for comfort.

## 2019-01-18 NOTE — DISCHARGE SUMMARY
Discharge Summary    CHIEF COMPLAINT ON ADMISSION  Chief Complaint   Patient presents with   • Weakness     Pt on hospice called EMS because of weakness and inability to ambulate.  Noted to be 80% on 6 L NC at time of admit to ER.  Pt on hospice for brain cancer with mets to lung.         Reason for Admission  Weakness     Admission Date  1/18/2019    CODE STATUS  DNAR/DNI    HPI & HOSPITAL COURSE  Please see original H&P and consult note for specific information, patient admitted due to weakness, patient with h/o breast CA with brain metastasis , she was on hospice care at home but since she was too weak and her symptoms were uncontrolled she was transferred to hospital, patient and family would like to continue on comfort care, palliative was consulted Dr Rapp evaluated patient and patient now can be transferred to hospice care, patient will be dc today continue comfort care protocol.     Therefore, she is discharged in fair and stable condition to hospice.        Discharge Date  1/18/19    FOLLOW UP ITEMS POST DISCHARGE  Hospice care.     DISCHARGE DIAGNOSES  Principal Problem:    Comfort measures only status POA: Unknown  Active Problems:    Brain metastasis (HCC) POA: Yes    Intracranial edema (HCC) POA: Yes    Seizure (HCC) POA: Yes    History of breast cancer in female POA: Yes    Acute hypoxemic respiratory failure (HCC) POA: Unknown    Bed sore POA: Unknown  Resolved Problems:    * No resolved hospital problems. *      FOLLOW UP  No future appointments.  No follow-up provider specified.    MEDICATIONS ON DISCHARGE     Medication List      ASK your doctor about these medications      Instructions   ATROPINE SULFATE (OPHTH) OP   Place 2 Drops under tongue every four hours as needed (For excessive secretions).  Dose:  2 Drop     bisacodyl 10 MG Supp  Commonly known as:  DULCOLAX   Insert 10 mg in rectum as needed (Constipation).  Dose:  10 mg     Cephalexin 500 MG Tabs   Take 500 mg by mouth 4 times a  day.  Dose:  500 mg     citalopram 20 MG Tabs  Commonly known as:  CELEXA   Take 20 mg by mouth every day.  Dose:  20 mg     dexamethasone 4 MG Tabs  Commonly known as:  DECADRON   Take 4 mg by mouth 2 times a day. Take in morning and early afternoon.  Dose:  4 mg     furosemide 20 MG Tabs  Commonly known as:  LASIX   Take 20 mg by mouth every day.  Dose:  20 mg     levETIRAcetam 500 MG Tabs  Commonly known as:  KEPPRA   Take 500 mg by mouth 2 times a day.  Dose:  500 mg     LORazepam 2 MG/ML Conc  Commonly known as:  ATIVAN   Take 0.5-1 mg by mouth every four hours as needed (agitation).  Dose:  0.5-1 mg     morphine ER 60 MG Tbcr tablet  Commonly known as:  MS CONTIN   Take 60 mg by mouth every 12 hours.  Dose:  60 mg     non-formulary med   Take 2 Drops by mouth PRN (Pain or nausea). Cannabis:    CBD tincture  Dose:  2 Drop     OMEPRAZOLE PO   Take 20 mg by mouth every day at 6 PM.  Dose:  20 mg     oxyCODONE-acetaminophen  MG Tabs  Commonly known as:  PERCOCET-10   Take 1-2 Tabs by mouth every 6 hours as needed. For headache  Dose:  1-2 Tab     sennosides-docusate sodium 8.6-50 MG tablet  Commonly known as:  SENOKOT-S   Take 1 Tab by mouth 2 times a day. PRN constipation  Dose:  1 Tab            Allergies  No Known Allergies    DIET  Orders Placed This Encounter   Procedures   • Diet Order Regular     Standing Status:   Standing     Number of Occurrences:   1     Order Specific Question:   Diet:     Answer:   Regular [1]       ACTIVITY  As tolerated.  Weight bearing as tolerated    CONSULTATIONS  Palliative care    PROCEDURES  none    LABORATORY  Lab Results   Component Value Date    SODIUM 133 (L) 10/29/2018    POTASSIUM 4.4 10/29/2018    CHLORIDE 98 10/29/2018    CO2 26 10/29/2018    GLUCOSE 114 (H) 10/29/2018    BUN 17 10/29/2018    CREATININE 0.67 10/29/2018    CREATININE 0.7 09/09/2008        Lab Results   Component Value Date    WBC 10.4 09/28/2018    HEMOGLOBIN 12.2 09/28/2018    HEMATOCRIT 37.1  09/28/2018    PLATELETCT 498 (H) 09/28/2018        Total time of the discharge process exceeds 30 minutes.

## 2019-01-18 NOTE — ED NOTES
Patient to [facility] via hospital bed with transport.  Family provided with clear personal belongings case and bag for personal items.

## 2019-01-18 NOTE — PROGRESS NOTES
Discussed with Dr Rapp, recommend discharging patient to [facility] level of care   Dr Gonsalez notified, he will do discharge orders and complete dc summary     Annabella Barton M.D.  01/18/19  9:34 AM

## 2019-01-18 NOTE — ASSESSMENT & PLAN NOTE
Symptoms progressively worsening without ability to care for herself and treat at home   Continue with Pain control   Anti anxiolytics  Bowel regimine   Eye drops  Advance diet as tolerated  02 for comfort  Inpatient hospice referal

## 2019-01-18 NOTE — ED TRIAGE NOTES
Chief Complaint   Patient presents with   • Weakness     Pt on hospice called EMS because of weakness and inability to ambulate.  Noted to be 80% on 6 L NC at time of admit to ER.  Pt on hospice for brain cancer with mets to lung.       BIBA to room.  Agree with triage assessment.  Changing into gown.  Chart placed for ERP eval.

## 2019-01-18 NOTE — ASSESSMENT & PLAN NOTE
Advanced with metastasis to brain and lung likely   S/p chemo and radiation no improvement   Now on hospice at home

## 2019-01-18 NOTE — DISCHARGE PLANNING
Case communication received, [facility] hospice patient admitted to ER overnight and now GIP.  Waiting on room assignment.  Patient resting in ER R7.  Opens eyes to hand touch.   DTR Ira bedside.  She reports other family members  flying in to [place].  DTR is tearful and holding up.   Sister Lily has been provided updates via phone.  MSW provided active listening, validation of thoughts and feelings, as well as reinforced Hospice goals of care.  No needs identified or requested by family at this time.      PLAN/OUTCOME: …….Social Service monitoring to identify needs, need for community resource referrals, assess support system, and plan for patient care through process.      Ongoing Contact:  MSW visits for emotional support through current plan of care.    Please contact me if needs/questions arise.  : BRIAN Cannon, LSW  Extension:

## 2019-01-18 NOTE — ED NOTES
Report from TD Bhat.  Patient resting comfortably in bed with eyes closed, even and unlabored respirations, awakens easily to voice, updated on POC, denies pain, call light within reach.

## 2019-01-18 NOTE — ED NOTES
Patient transferred to hospital bed.  Clean, dry, no incontinence.  Repositioned to left side.  Family updated on POC.

## 2019-01-18 NOTE — PROGRESS NOTES
Attending Hospitalist today is Dr. Peterson starting at 0700. Please call this Physician for orders, updates and questions.

## 2019-01-18 NOTE — ED PROVIDER NOTES
ED Provider Note    CHIEF COMPLAINT  Weakness    HPI  Saadia Connie Crook is a 68 y.o. female who presents with generalized weakness.  Patient is a history of breast cancer with brain metastases.  She is also had intracranial hemorrhage related to these metastases.  She has advanced disease and is currently on hospice.  She has been taking care of herself at home.  She has been weak for some time, but worse weakness last night.  Could not walk even a few feet to a door.  She could not barely get off the ground and therefore called the paramedics to bring her to the hospital.  She feels like she is having a hard time breathing.  Her oxygen saturation has been low at home.  The symptoms are severe.  In addition to the above complaints she has had diffuse severe headache as well.  Denies focal weakness or confusion.    I spoke with patient regarding end of life issues.  She states that she just wants to be comfortable and without pain.  She does not want any other treatment or medical evaluation because it is felt to be futile.    Chart is reviewed with past history as above.  She has a DO NOT RESUSCITATE order and request comfort care only.    REVIEW OF SYSTEMS  As per HPI, otherwise a 10 point review of systems is negative    PAST MEDICAL HISTORY  Past Medical History:   Diagnosis Date   • Cancer (HCC) 1995    breast ca   • Cancer (HCC) 04/2018    Brain tumors,several   • Cataract     needs removal   • Diverticulitis    • Essential hypertension, benign 6/15/2016   • Hiatus hernia syndrome    • Indigestion    • Kidney stones    • Peptic ulcer     small antral ulcer found on EGD 2011   • Psychiatric problem     Anxiety; situational-no meds   • Seizure (HCC) 04/2018    related to brain tumors       SOCIAL HISTORY  Social History   Substance Use Topics   • Smoking status: Light Tobacco Smoker     Packs/day: 0.25     Years: 30.00     Types: Cigarettes   • Smokeless tobacco: Never Used   • Alcohol use No       SURGICAL  "HISTORY  Past Surgical History:   Procedure Laterality Date   • CRANIOTOMY STEALTH Right 4/16/2018    Procedure: CRANIOTOMY STEALTH-FRONTAL;  Surgeon: Cliff Masterson M.D.;  Location: SURGERY [facility];  Service: Neurosurgery   • BRONCHOSCOPY-ENDO N/A 4/11/2018    Procedure: BRONCHOSCOPY-ENDO;  Surgeon: Ken Crandall M.D.;  Location: ENDOSCOPY [facility];  Service: Pulmonary   • GASTROSCOPY WITH BIOPSY  10/11/2011    Performed by TASHIA CRAIG at ENDOSCOPY [facility]   • COLONOSCOPY WITH POLYP  10/11/2011    Performed by TASHIA CRAIG at ENDOSCOPY [facility]   • LUMPECTOMY  1978    right   • ABDOMINAL EXPLORATION     • ABDOMINAL HYSTERECTOMY TOTAL      BSO   • APPENDECTOMY     • GYN SURGERY      hysterectomy   • OTHER      tonsilectomy   • TONSILLECTOMY AND ADENOIDECTOMY         CURRENT MEDICATIONS  Home Medications    **Home medications have not yet been reviewed for this encounter**         ALLERGIES  No Known Allergies    PHYSICAL EXAM  VITAL SIGNS: /62   Pulse (!) 101   Temp 36.3 °C (97.3 °F) (Temporal)   Resp 16   Ht 1.727 m (5' 8\")   Wt 79 kg (174 lb 2.6 oz)   SpO2 88%   BMI 26.48 kg/m²    Constitutional: Awake and tired appearing female alopecia.  HENT: Mildly dry mucous membranes  Eyes: Normal inspection  Neck: Supple  Cardiovascular: Tachycardic heart rate, Normal rhythm.  Symmetric peripheral pulses.   Thorax & Lungs: Mild increased work of breathing with symmetric breath sounds that are somewhat diminished equally versus decreased effort  Abdomen: Diffuse tenderness  Skin: Cold extremities  Back: Edema bilateral lower extremities worse on the left on the right.  She has skin breakdown on the lateral aspect of the right foot.  There are skin lesions over the dorsal feet bilaterally.  Extremities: As described above  Neurologic: Awake and conversant.  Oriented to person and place.  And move extremities.  Appears generally weak.  Psychiatric: Anxious " appearing      Medications   morphine (pf) 10 mg/mL injection 5-10 mg (not administered)   ondansetron (ZOFRAN) syringe/vial injection 4 mg (not administered)     COURSE & MEDICAL DECISION MAKING  Patient presents to the ER generally weak with significant hypoxia cold extremities and pedal edema.  She also complains of a headache.  She has a past history as above.  I am concerned that she could have pulmonary embolism or pleural effusion resulting in hypoxia.  Brain metastasis with edema and/or hemorrhage is also likely.  The patient only desires comfort care at this time which given her poor prognosis appears reasonable.  I have ordered morphine and Zofran.  She was placed on supplemental oxygen with relief of her breathlessness.  I paged hospice care.    I discussed with the patient's hospice nurse.  She was very familiar with the patient.  It was thought that the patient would want to go home, but patient states that symptoms have not been manageable at home and that she feels better with her supplemental oxygen and IV analgesic thus would l prefer inpatient treatment.  Dr. Rapp was notified of the patient and will evaluate.  Patient will be placed on observation pending GIP.  I discussed with the hospitalist.    FINAL IMPRESSION  1.  Weakness  2.  Hypoxia, suspected pulmonary embolism  3.  Headache suspected intracranial hemorrhage versus cerebral edema related to metastasis  4.  Comfort care      This dictation was created using voice recognition software. The accuracy of the dictation is limited to the abilities of the software.  The nursing notes were reviewed and certain aspects of this information were incorporated into this note.      Electronically signed by: Ervin Dorado, 1/18/2019 4:46 AM

## 2019-01-18 NOTE — ED NOTES
Accessed port, but did not get blood return.  Alerted ERP.  Pt reports that at times the access does not give blood return.

## 2019-01-18 NOTE — ED NOTES
Patient discharged from hospitalist service, to be readmitted by Dr. Rapp, family at bedside, patient resting comfortably, declines medication interventions at this time, hospital bed ordered.

## 2019-01-18 NOTE — H&P
Hospital Medicine History & Physical Note    Date of Service  1/18/2019    Primary Care Physician  Toñito Recinos M.D.    Consultants  Hospice    Code Status  DNR/DNI    Chief Complaint  Weakness     History of Presenting Illness  68 y.o. female who presented 1/18/2019 with pmx of breast cancer with brain metastasis who presents with weakness.  Patient is on home hospice and her symptoms are uncontrolled based on her treatment at home.  She has had advanced disease and has been taking care of herself at home.  However she is developed worsening weakness shortness of breath she could barely get off the floor and called EMS.  She is also had hypoxia at home symptoms are progressively worsening.  She is also had a diffuse severe headache.  She is also had lower extremity edema.  This patient does not want any further medical intervention because she has felt it is to be futile and she just wants to be comfort care.  She is also a DO NOT RESUSCITATE.    Review of Systems  Review of Systems   Constitutional: Positive for malaise/fatigue. Negative for chills and fever.   HENT: Negative for congestion, hearing loss and tinnitus.    Eyes: Negative for blurred vision, double vision and discharge.   Respiratory: Positive for shortness of breath. Negative for cough and hemoptysis.    Cardiovascular: Positive for leg swelling. Negative for chest pain and palpitations.   Gastrointestinal: Positive for abdominal pain. Negative for heartburn, nausea and vomiting.   Genitourinary: Negative for dysuria and flank pain.   Musculoskeletal: Positive for joint pain. Negative for myalgias.   Skin: Negative for rash.   Neurological: Positive for weakness. Negative for dizziness, sensory change, speech change and focal weakness.   Endo/Heme/Allergies: Negative for environmental allergies. Does not bruise/bleed easily.   Psychiatric/Behavioral: Negative for depression, hallucinations and substance abuse.       Past Medical History   has  a past medical history of Cancer (HCC) (1995); Cancer (HCC) (04/2018); Cataract; Diverticulitis; Essential hypertension, benign (6/15/2016); Hiatus hernia syndrome; Indigestion; Kidney stones; Peptic ulcer; Psychiatric problem; and Seizure (HCC) (04/2018).    Surgical History   has a past surgical history that includes other; gastroscopy with biopsy (10/11/2011); colonoscopy with polyp (10/11/2011); abdominal exploration; appendectomy; tonsillectomy and adenoidectomy; lumpectomy (1978); bronchoscopy-endo (N/A, 4/11/2018); craniotomy stealth (Right, 4/16/2018); gyn surgery; and abdominal hysterectomy total.     Family History  family history includes Cancer in her mother; Hypertension in her mother; Stroke in her father.     Social History   reports that she has been smoking Cigarettes.  She has a 7.50 pack-year smoking history. She has never used smokeless tobacco. She reports that she does not drink alcohol or use drugs.    Allergies  No Known Allergies    Medications  Prior to Admission Medications   Prescriptions Last Dose Informant Patient Reported? Taking?   ATROPINE SULFATE, OPHTH, OP   Yes No   Sig: Place 2 Drops under tongue every four hours as needed (For excessive secretions).   Cephalexin 500 MG Tab   Yes No   Sig: Take 500 mg by mouth 4 times a day.   Home Care Oxygen   Yes No   Sig: Inhale 2 L/min by mouth as needed.   LORazepam (ATIVAN) 2 MG/ML Conc   Yes No   Sig: Take 0.5-1 mg by mouth every four hours as needed (agitation).   OMEPRAZOLE PO   Yes No   Sig: Take 20 mg by mouth every day at 6 PM.   acetaminophen/caffeine/butalbital 325-40-50 mg (FIORICET) -40 MG Tab  Patient No No   Sig: Take 1 Tab by mouth every 6 hours as needed for Headache.   Patient not taking: Reported on 10/4/2018   azithromycin (ZITHROMAX) 250 MG Tab   No No   Sig: Take two tabs by mouth on day one, then one tab by mouth daily on days 2-5.   Patient not taking: Reported on 10/4/2018   azithromycin (ZITHROMAX) 250 MG Tab    No No   Sig: Take two tabs by mouth on day one, then one tab by mouth daily on days 2-5.   Patient not taking: Reported on 10/4/2018   bisacodyl (DULCOLAX) 10 MG Suppos   Yes No   Sig: Insert 10 mg in rectum as needed (Constipation).   citalopram (CELEXA) 20 MG Tab   Yes No   Sig: Take 20 mg by mouth every day.   dexamethasone (DECADRON) 4 MG Tab   Yes No   Sig: Take 4 mg by mouth 2 times a day. Take in morning and early afternoon.   furosemide (LASIX) 20 MG Tab   Yes No   Sig: Take 20 mg by mouth every day.   levETIRAcetam (KEPPRA) 500 MG Tab  Patient Yes No   Sig: Take 500 mg by mouth 2 times a day.   morphine (ROXANOL) 20 MG/ML Solution   Yes No   Sig: Take 5 mg by mouth every 2 hours as needed. For pain or shortness of breath   morphine ER (MS CONTIN) 60 MG Tab CR tablet   Yes No   Sig: Take 60 mg by mouth every 12 hours.   non-formulary med   Yes No   Sig: Take 2 Drops by mouth PRN (Pain or nausea). Cannabis:    CBD tincture   oxyCODONE-acetaminophen (PERCOCET-10)  MG Tab  Patient Yes No   Sig: Take 1-2 Tabs by mouth every 6 hours as needed. For headache   sennosides-docusate sodium (SENOKOT-S) 8.6-50 MG tablet   Yes No   Sig: Take 1 Tab by mouth 2 times a day. PRN constipation      Facility-Administered Medications: None       Physical Exam  Temp:  [36.3 °C (97.3 °F)] 36.3 °C (97.3 °F)  Pulse:  [101] 101  Resp:  [16] 16  BP: (105)/(62) 105/62  SpO2:  [88 %] 88 %    Physical Exam   Constitutional: She appears distressed.   Malnurished, frail   HENT:   Head: Normocephalic and atraumatic.   Eyes: Pupils are equal, round, and reactive to light. Conjunctivae and EOM are normal.   Neck: Normal range of motion. Neck supple. No JVD present.   Cardiovascular: Regular rhythm, normal heart sounds and intact distal pulses.    tachycardia   Pulmonary/Chest:   Diminished breath sounds   Abdominal: Soft. Bowel sounds are normal. She exhibits no distension. There is no tenderness.   Musculoskeletal: Normal range of  motion. She exhibits edema.   Chronic changes, left side worse than right   Neurological: She is alert. She exhibits normal muscle tone.   Lethargic, arousable   Skin: Skin is warm and dry.   Psychiatric: She has a normal mood and affect. Her behavior is normal. Judgment and thought content normal.   Nursing note and vitals reviewed.      Laboratory:          No results for input(s): ALTSGPT, ASTSGOT, ALKPHOSPHAT, TBILIRUBIN, DBILIRUBIN, GAMMAGT, AMYLASE, LIPASE, ALB, PREALBUMIN, GLUCOSE in the last 72 hours.              No results for input(s): TROPONINI in the last 72 hours.    Urinalysis:    No results found     Imaging:  No orders to display         Assessment/Plan:  I anticipate this patient is appropriate for observation status at this time.    * Comfort measures only status   Assessment & Plan    Symptoms progressively worsening without ability to care for herself and treat at home   Continue with Pain control   Anti anxiolytics  Bowel regimine   Eye drops  Advance diet as tolerated  02 for comfort  Inpatient hospice referal      Seizure (HCC)- (present on admission)   Assessment & Plan    Known seizure disorder likely due to metastatic disease  Hold home keppra      Intracranial edema (HCC)- (present on admission)   Assessment & Plan    On decadron daily hold for now given comfort measures     Brain metastasis (HCC)- (present on admission)   Assessment & Plan    S/p chemo/radiation no improvement  On home hospice     Bed sore   Assessment & Plan    Pain control, wound care  Frequent turning     Acute hypoxemic respiratory failure (HCC)   Assessment & Plan    Broad differential however patient refuses care wants to be made comfort measures   Continue 02 for support and comfort     History of breast cancer in female- (present on admission)   Assessment & Plan    Advanced with metastasis to brain and lung likely   S/p chemo and radiation no improvement   Now on hospice at home         VTE prophylaxis: none

## 2019-01-19 NOTE — PROGRESS NOTES
Assumed care of the patient at 1345, patient transported from ED via hospital bed. Family present at bedside.     Patient is obtunded, minimal response to noxious stimuli. Family updated regarding inpatient hospice and comfort care measures. All questions have been answered at this time. Seizure precautions in place.     Patient has R port, no blood return, but flushes well.    Patient has been medicated for pain and labored breathing with IV morphine.     Patient is on 15 L non-rebreather. Patient has been transitioned to nasal cannula.     Patient has not ambulated, fall precautions are in place, hourly rounding implemented.

## 2019-01-19 NOTE — PROGRESS NOTES
Assumed care at change of shift, received report from the NOC RN. Pt is calm, sleeping, with unlabored breathing. Family present at bedside and comfort care measures are in place.     Patient has R port, no blood return, but flushes easily. IV morphine in use for pain management.     Patient has wounds present on BLE and buttocks. Dressings in place on BLE. Patient is refusing turning at this time, but q 2 turns have been offered and education has been provided.     Patient is incontinent of urine, but refused the mcghee for night shift.     Fall precautions implemented, call light within reach and hourly rounding in place.

## 2019-01-19 NOTE — CARE PLAN
Problem: Safety  Goal: Free from accidental injury  Outcome: PROGRESSING AS EXPECTED    Intervention: Initiate Fall Precautions  Bed frame alarm on  Daughter is at bedside      Problem: Pain  Goal: Alleviation of Pain or a reduction in pain to the patient's comfort goal  Outcome: PROGRESSING AS EXPECTED    Intervention: Pain Management--Medications  PRN IV morphine in use for pain control

## 2019-01-19 NOTE — PROGRESS NOTES
Pt is on inpatient hopsice/comfort care. There is lots of family at the bedside. PRN IV morphine in use for pain. Pt is refusing Q2 turns while she is sleeping. Pt is declining use of mcghee catheter at this time. Wounds on BLE and buttocks. Daughter is staying the night with her. Clustering care for patient rest. Call light within reach, bed alarm on, Q2 hr rounding.

## 2019-01-20 NOTE — DISCHARGE SUMMARY
Death Summary    Cause of Death  Acute on chronic respiratory failure with hypoxia   due to Breast cancer with lung and  brain metastases.     Comorbid Conditions at the Time of Death  1. Acute on chronic respiratory failure with hypoxia  2.  Breast cancer with lung and  brain metastases.  3. Cancer related HA pain  4. Increased debility  5. DNAR/DNI - previously home hospice care. Live alone.      History of Presenting Illness and Hospital Course  This is a 68 y.o. female admitted 1/18/2019 who presents with generalized weakness, increased severity of HA pain and increasing dyspnea.  Patient has a history of breast cancer with lung and  brain metastases.  She is also had intracranial hemorrhage related to these metastases.  She has advanced disease and is currently on hospice.  She has been taking care of herself at home.  She has been weak for some time, but worse weakness last night.  Could not walk even a few feet to a door and therefore called the paramedics to bring her to the hospital.  She feels like she is having a hard time breathing.  Her oxygen saturation has been low at home but while in the ED her saturation dropped considerably requiring 15 L oxygen to maintain saturation above 90%. She continued to decline and was admitted to [facility] where she required increasing doses of morphine IV to manage her symptoms. She was comfortable at the time of her passing. Her family will be entered into [facility].    Death Date: 01/19/19   Death Time: 1235         Pronounced By (RN1): Fide Up  Pronounced By (RN2): [place]

## 2019-06-24 LAB
CHEMOTHERAPY INFUSION START DATE: NORMAL
CHEMOTHERAPY RECORDS: 20
CHEMOTHERAPY RECORDS: 2000
CHEMOTHERAPY RECORDS: 2700
CHEMOTHERAPY RECORDS: 9
CHEMOTHERAPY RECORDS: NORMAL
CHEMOTHERAPY RX CANCER: NORMAL
RAD ONC ARIA COURSE TREATMENT ELAPSED DAYS: NORMAL
RAD ONC ARIA PLAN TREATMENT DATES: NORMAL
RAD ONC ARIA REFERENCE POINT DOSAGE GIVEN TO DATE: 20
RAD ONC ARIA REFERENCE POINT DOSAGE GIVEN TO DATE: 24.49
RAD ONC ARIA REFERENCE POINT DOSAGE GIVEN TO DATE: 24.51
RAD ONC ARIA REFERENCE POINT DOSAGE GIVEN TO DATE: 24.84
RAD ONC ARIA REFERENCE POINT DOSAGE GIVEN TO DATE: 27
RAD ONC ARIA REFERENCE POINT DOSAGE GIVEN TO DATE: 31.98
RAD ONC ARIA REFERENCE POINT ID: NORMAL

## 2023-07-26 NOTE — PROGRESS NOTES
Neurosurgery Progress Note    Subjective:  No HA this am. Pain well controlled.   Less anxious this am. Calm and looking forward to crani.  Feels steady on feet   No dizziness, nausea or pain complaints.     Pathology as follows:     A. Right upper lobe anterior segment biopsy:         Benign lung tissue demonstrating fibrosis, reactive changes,          focal squamous metaplasia, mild acute and chronic inflammation.         No malignancy identified.  B. Right upper lobe brushings:         Benign bronchial cells some with reactive atypia, degenerated          cells, macrophages some pigment laden, debris and mucus.         No malignant cells identified.  C. Right upper lobe bronchial washings:         No malignant cells identified.         Bronchial cells some with reactive changes, macrophages,          scattered lymphocytes, neutrophils, degenerated cells and          mucus.    Exam:  A&Ox4  NAD  Spontaneous breathing on room air with normal respiratory effort  Follows commands x4  PERRLA. EOMI.   Face is symmetrical, slight tongue deviation to the left   CN II-XII grossly intact bilat  No difficulty with speech  Negative pronator drift test  No difficulty with rapid alternating movements  Appropriate muscle tone and sensation intact all four extremities.   Gait not tested.      BP  Min: 124/71  Max: 148/94  Pulse  Av.7  Min: 70  Max: 80  Resp  Av  Min: 18  Max: 18  Temp  Av.3 °C (97.3 °F)  Min: 36.1 °C (96.9 °F)  Max: 36.5 °C (97.7 °F)  SpO2  Av.3 %  Min: 94 %  Max: 96 %    No Data Recorded    Recent Labs      18   WBC  13.2*  10.8   RBC  3.93*  4.48   HEMOGLOBIN  13.7  15.1   HEMATOCRIT  39.0  43.9   MCV  99.2*  98.0*   MCH  34.9*  33.7*   MCHC  35.1*  34.4   RDW  46.4  45.5   PLATELETCT  387  358   MPV  9.2  9.8     Recent Labs      18   034   SODIUM  136  132*   POTASSIUM  4.7  5.2   CHLORIDE  104  101   CO2  25  22   GLUCOSE  142*  106*    BUN  15  16   CREATININE  0.58  0.60   CALCIUM  9.6  9.6               Intake/Output     None          No intake or output data in the 24 hours ending 04/14/18 0909         • heparin  5,000 Units Q12HRS   • acetaminophen/caffeine/butalbital 325-40-50 mg  1 Tab Q6HRS PRN   • cefTRIAXone (ROCEPHIN) IV  2 g Q24HRS   • dexamethasone  4 mg Q6HRS   • zolpidem  5 mg HS PRN   • levETIRAcetam  500 mg BID   • senna-docusate  2 Tab BID    And   • polyethylene glycol/lytes  1 Packet QDAY PRN    And   • magnesium hydroxide  30 mL QDAY PRN    And   • bisacodyl  10 mg QDAY PRN   • nicotine  14 mg Daily-0600    And   • nicotine polacrilex  2 mg Q HOUR PRN   • acetaminophen  650 mg Q6HRS PRN   • ondansetron  4 mg Q4HRS PRN   • labetalol  10 mg Q4HRS PRN   • escitalopram  20 mg DAILY   • lisinopril  20 mg DAILY   • omeprazole  20 mg DAILY   • morphine injection  2 mg Q3HRS PRN       Assessment:  Hospital day #5: 67-year-old female with a history of breast cancer. Probable breast cancer metastases to the brain and lung.  Prophylactic anticoagulation: OK for heparin 5000 subcutaneous BID, d/c 12 hours prior to sx     Plan:  Bronchoscopic biopsy for right hilar mass- Benign lung tissue demonstrating fibrosis, reactive changes, focal squamous metaplasia,   On decadron 4mg q6h IV.  OK for heparin anticoagulation   Continue with pain control.   Ok to mobilize with PT/OT.   On Keppra 500mg Q12 for seizure ppx.   Q4h neuro checks. Neuro exam intact, gait not tested.    Ok for regular diet.  Plan for craniectomy for resection of mass- risks, benefits, alteratives to treatment were discussed. Risks discussed include but not limited to death, coma, paralysis, stroke, memory/mental status changes, changes in coordination and muscle function, infection, bleed.   Will plan for craniotomy and resection of right frontal mass followed by whole brain radiation.   Surgery date/time TBD  OK for sam for HA     Case discussed with patient,   Zelalem.       27 Y/O F denies PMH or PSH presents with nausea, vomiting and diarrhea for the past day. Pt states that multiple family members are currently ill with similar sx. Pt states she vomited multiple times today and notes generalized abdominal pain worse in the epigastric area. Pt denies fever chills or nightsweats. Pt states she did not take medication for her sx prior to ED arrival. Pt denies any other sx or acute complaints.

## (undated) DEVICE — SET EXTENSION WITH 2 PORTS (48EA/CA) ***PART #2C8610 IS A SUBSTITUTE*****

## (undated) DEVICE — SYRINGE SAFETY TB 1 CC 25 GA X 5/8 - NDL  (50/BX)

## (undated) DEVICE — RUBBERBAND STERILE #64 LATEX FREE (100/CA)

## (undated) DEVICE — PROTECTOR ULNA NERVE - (36PR/CA)

## (undated) DEVICE — SODIUM CHL. INJ. 0.9% 500ML (24EA/CA 50CA/PF)

## (undated) DEVICE — GLOVE BIOGEL INDICATOR SZ 6.5 SURGICAL PF LTX - (50PR/BX 4BX/CA)

## (undated) DEVICE — GLOVE SZ 6 BIOGEL PI MICRO - PF LF (50PR/BX 4BX/CA)

## (undated) DEVICE — SUTURE 4-0 NUROLON CR/8 TF - (12/BX) ETHICON

## (undated) DEVICE — SPHERE NAVIGATION STEALTH (5EA/TY 12TY/PK)

## (undated) DEVICE — SURGIFOAM (SIZE 100) - (6EA/CA)

## (undated) DEVICE — SUTURE GENERAL

## (undated) DEVICE — HEMOSTAT SURG ABSORBABLE - 4 X 8 IN SURGICEL (24EA/CA)

## (undated) DEVICE — CON SEDATION/>5 YR 1ST 15 MIN

## (undated) DEVICE — PERFORATER DISP TIP DGR-0

## (undated) DEVICE — DRESSING XEROFORM 1X8 - (50/BX 4BX/CA)

## (undated) DEVICE — PATTIES SURG X-RAYCOTTONOID - 1 X 3 IN (200/CA)

## (undated) DEVICE — TRAY SURESTEP FOLEY TEMP SENSING 16FR (10EA/CA) ORDER  #18764 FOR TEMP FOLEY ONLY

## (undated) DEVICE — SUCTION INSTRUMENT YANKAUER BULBOUS TIP W/O VENT (50EA/CA)

## (undated) DEVICE — KIT RADIAL ARTERY 20GA W/MAX BARRIER AND BIOPATCH  (5EA/CA) #10740 IS FOR THE SET RADIAL ARTERIAL

## (undated) DEVICE — BANDAGE ROLL STERILE BULKEE 4.5 IN X 4 YD (100EA/CA)

## (undated) DEVICE — BLANKET WARMING FULL BODY - (10/CA)

## (undated) DEVICE — GLOVE BIOGEL PI ORTHO SZ 8 PF LF (40PR/BX)

## (undated) DEVICE — CANISTER SUCTION 3000ML MECHANICAL FILTER AUTO SHUTOFF MEDI-VAC NONSTERILE LF DISP  (40EA/CA)

## (undated) DEVICE — GLOVE SZ 6.5 BIOGEL PI MICRO - PF LF (50PR/BX)

## (undated) DEVICE — CON SEDATION EA ADDL 15 MIN

## (undated) DEVICE — TOWELS CLOTH SURGICAL - (4/PK 20PK/CA)

## (undated) DEVICE — MIDAS LUBRICATOR DIFFUSER PACK (4EA/CA)

## (undated) DEVICE — SOD. CHL 10CC SYRINGE PREFILL - W/10 CC (30/BX)

## (undated) DEVICE — PACK CRANI - (1EA/CA)

## (undated) DEVICE — SPONGE GAUZESTER 4 X 4 4PLY - (128PK/CA)

## (undated) DEVICE — ELECTRODE DUAL RETURN W/ CORD - (50/PK)

## (undated) DEVICE — NEPTUNE 4 PORT MANIFOLD - (20/PK)

## (undated) DEVICE — BLADE CLIPPER FITS 2501 CLIPPER (BLUE)  (20EA/CA)

## (undated) DEVICE — TUBE E-T HI-LO CUFF 7.0MM (10EA/PK)

## (undated) DEVICE — COVER PROBE STERILE CONE (12EA/CA)

## (undated) DEVICE — TUBING CLEARLINK DUO-VENT - C-FLO (48EA/CA)

## (undated) DEVICE — SUTURE 4-0 MONOCRYL PLUS PS-2 - 27 INCH (36/BX)

## (undated) DEVICE — SURGIFOAM (12X7) - (12EA/CA)

## (undated) DEVICE — STAPLER SKIN DISP - (6/BX 10BX/CA) VISISTAT

## (undated) DEVICE — DISSECT TOOL MIDAS F2/8TA23

## (undated) DEVICE — TUBING C&T SET FLYING LEADS DRAIN TUBING (10EA/BX)

## (undated) DEVICE — SUTURE 2-0 VICRYL PLUS CT-1 - 8 X 18 INCH(12/BX)

## (undated) DEVICE — MASK ANESTHESIA ADULT  - (100/CA)

## (undated) DEVICE — TRANSDUCER ADULT DISP. SINGLE BONDED STERILE - (20EA/CA)

## (undated) DEVICE — KIT SURGIFLO W/OUT THROMBIN - (6EA/CA)

## (undated) DEVICE — SCALP CLIP RANEY 20-1037 (10EA/PK 20PK/CA)

## (undated) DEVICE — GLOVE BIOGEL PI INDICATOR SZ 7.0 SURGICAL PF LF - (50/BX 4BX/CA)

## (undated) DEVICE — NEEDLE NON SAFETY HYPO 22 GA X 1 1/2 IN (100/BX)

## (undated) DEVICE — ELECTRODE 850 FOAM ADHESIVE - HYDROGEL RADIOTRNSPRNT (50/PK)

## (undated) DEVICE — KIT ROOM DECONTAMINATION

## (undated) DEVICE — KIT ANESTHESIA W/CIRCUIT & 3/LT BAG W/FILTER (20EA/CA)

## (undated) DEVICE — SYRINGE 3 CC 22 GA X 1-1/2 - NDL SAFETY (50/BX 8BX/CA)

## (undated) DEVICE — DRAPE STRLE REG TOWEL 18X24 - (10/BX 4BX/CA)"

## (undated) DEVICE — PATTIES SURG X-RAYCOTTONOID - 1/2 X 3 IN (200/CA)

## (undated) DEVICE — SET LEADWIRE 5 LEAD BEDSIDE DISPOSABLE ECG (1SET OF 5/EA)

## (undated) DEVICE — CHLORAPREP 26 ML APPLICATOR - ORANGE TINT(25/CA)

## (undated) DEVICE — SPONGE GAUZE STER 4X4 8-PL - (2/PK 50PK/BX 12BX/CS)

## (undated) DEVICE — PIN HEAD MAYFIELD DISP. (3EA/PK 12PK/BX)

## (undated) DEVICE — SODIUM CHL IRRIGATION 0.9% 1000ML (12EA/CA)

## (undated) DEVICE — BOVIE BLADE COATED &INSULATED - 25/PK

## (undated) DEVICE — LACTATED RINGERS INJ. 500 ML - (24EA/CA)